# Patient Record
Sex: FEMALE | Race: WHITE | Employment: OTHER | URBAN - METROPOLITAN AREA
[De-identification: names, ages, dates, MRNs, and addresses within clinical notes are randomized per-mention and may not be internally consistent; named-entity substitution may affect disease eponyms.]

---

## 2018-11-07 PROBLEM — Z86.73 HISTORY OF CVA (CEREBROVASCULAR ACCIDENT): Status: ACTIVE | Noted: 2018-11-07

## 2018-11-07 PROBLEM — I65.21 STENOSIS OF RIGHT CAROTID ARTERY: Status: ACTIVE | Noted: 2018-11-07

## 2018-11-07 PROBLEM — E78.5 HYPERLIPIDEMIA: Status: ACTIVE | Noted: 2018-11-07

## 2018-11-07 PROBLEM — I10 ESSENTIAL HYPERTENSION: Status: ACTIVE | Noted: 2018-11-07

## 2019-07-11 ENCOUNTER — HOSPITAL ENCOUNTER (OUTPATIENT)
Dept: PREADMISSION TESTING | Age: 69
Discharge: HOME | DRG: 253 | End: 2019-07-11
Attending: SURGERY
Payer: MEDICARE

## 2019-07-11 ENCOUNTER — ANESTHESIA EVENT (OUTPATIENT)
Dept: SURGERY | Age: 69
DRG: 253 | End: 2019-07-11
Payer: MEDICARE

## 2019-07-11 ENCOUNTER — HOSPITAL ENCOUNTER (OUTPATIENT)
Dept: GENERAL RADIOLOGY | Age: 69
Discharge: HOME | DRG: 253 | End: 2019-07-11
Attending: SURGERY
Payer: MEDICARE

## 2019-07-11 VITALS
WEIGHT: 173.5 LBS | DIASTOLIC BLOOD PRESSURE: 52 MMHG | OXYGEN SATURATION: 98 % | SYSTOLIC BLOOD PRESSURE: 94 MMHG | TEMPERATURE: 98.1 F | BODY MASS INDEX: 32.76 KG/M2 | RESPIRATION RATE: 18 BRPM | HEIGHT: 61 IN | HEART RATE: 68 BPM

## 2019-07-11 LAB
ABO + RH BLD: NORMAL
ALBUMIN SERPL-MCNC: 3.5 G/DL (ref 3.5–5)
ALBUMIN/GLOB SERPL: 1.1 {RATIO} (ref 1.1–2.2)
ALP SERPL-CCNC: 93 U/L (ref 45–117)
ALT SERPL-CCNC: 19 U/L (ref 12–78)
ANION GAP SERPL CALC-SCNC: 5 MMOL/L (ref 5–15)
APPEARANCE UR: CLEAR
APTT PPP: 30.8 SEC (ref 22.1–32)
AST SERPL-CCNC: 13 U/L (ref 15–37)
ATRIAL RATE: 67 BPM
BACTERIA URNS QL MICRO: NEGATIVE /HPF
BILIRUB SERPL-MCNC: 0.4 MG/DL (ref 0.2–1)
BILIRUB UR QL: NEGATIVE
BLOOD GROUP ANTIBODIES SERPL: NORMAL
BUN SERPL-MCNC: 15 MG/DL (ref 6–20)
BUN/CREAT SERPL: 10 (ref 12–20)
CALCIUM SERPL-MCNC: 8.5 MG/DL (ref 8.5–10.1)
CALCULATED P AXIS, ECG09: 52 DEGREES
CALCULATED R AXIS, ECG10: 69 DEGREES
CALCULATED T AXIS, ECG11: 88 DEGREES
CHLORIDE SERPL-SCNC: 107 MMOL/L (ref 97–108)
CO2 SERPL-SCNC: 27 MMOL/L (ref 21–32)
COLOR UR: ABNORMAL
CREAT SERPL-MCNC: 1.44 MG/DL (ref 0.55–1.02)
DIAGNOSIS, 93000: NORMAL
EPITH CASTS URNS QL MICRO: ABNORMAL /LPF
ERYTHROCYTE [DISTWIDTH] IN BLOOD BY AUTOMATED COUNT: 13.8 % (ref 11.5–14.5)
GLOBULIN SER CALC-MCNC: 3.2 G/DL (ref 2–4)
GLUCOSE SERPL-MCNC: 90 MG/DL (ref 65–100)
GLUCOSE UR STRIP.AUTO-MCNC: NEGATIVE MG/DL
HCT VFR BLD AUTO: 37.9 % (ref 35–47)
HGB BLD-MCNC: 12.4 G/DL (ref 11.5–16)
HGB UR QL STRIP: NEGATIVE
HYALINE CASTS URNS QL MICRO: ABNORMAL /LPF (ref 0–5)
INR PPP: 1 (ref 0.9–1.1)
KETONES UR QL STRIP.AUTO: NEGATIVE MG/DL
LEUKOCYTE ESTERASE UR QL STRIP.AUTO: ABNORMAL
MCH RBC QN AUTO: 29 PG (ref 26–34)
MCHC RBC AUTO-ENTMCNC: 32.7 G/DL (ref 30–36.5)
MCV RBC AUTO: 88.6 FL (ref 80–99)
NITRITE UR QL STRIP.AUTO: NEGATIVE
NRBC # BLD: 0 K/UL (ref 0–0.01)
NRBC BLD-RTO: 0 PER 100 WBC
P-R INTERVAL, ECG05: 166 MS
PH UR STRIP: 6 [PH] (ref 5–8)
PLATELET # BLD AUTO: 269 K/UL (ref 150–400)
PMV BLD AUTO: 10.8 FL (ref 8.9–12.9)
POTASSIUM SERPL-SCNC: 3.7 MMOL/L (ref 3.5–5.1)
PROT SERPL-MCNC: 6.7 G/DL (ref 6.4–8.2)
PROT UR STRIP-MCNC: NEGATIVE MG/DL
PROTHROMBIN TIME: 10.6 SEC (ref 9–11.1)
Q-T INTERVAL, ECG07: 446 MS
QRS DURATION, ECG06: 86 MS
QTC CALCULATION (BEZET), ECG08: 471 MS
RBC # BLD AUTO: 4.28 M/UL (ref 3.8–5.2)
RBC #/AREA URNS HPF: ABNORMAL /HPF (ref 0–5)
SODIUM SERPL-SCNC: 139 MMOL/L (ref 136–145)
SP GR UR REFRACTOMETRY: 1.01 (ref 1–1.03)
SPECIMEN EXP DATE BLD: NORMAL
THERAPEUTIC RANGE,PTTT: NORMAL SECS (ref 58–77)
UA: UC IF INDICATED,UAUC: ABNORMAL
UROBILINOGEN UR QL STRIP.AUTO: 1 EU/DL (ref 0.2–1)
VENTRICULAR RATE, ECG03: 67 BPM
WBC # BLD AUTO: 7.3 K/UL (ref 3.6–11)
WBC URNS QL MICRO: ABNORMAL /HPF (ref 0–4)

## 2019-07-11 PROCEDURE — 81001 URINALYSIS AUTO W/SCOPE: CPT

## 2019-07-11 PROCEDURE — 36415 COLL VENOUS BLD VENIPUNCTURE: CPT

## 2019-07-11 PROCEDURE — 85027 COMPLETE CBC AUTOMATED: CPT

## 2019-07-11 PROCEDURE — 71046 X-RAY EXAM CHEST 2 VIEWS: CPT

## 2019-07-11 PROCEDURE — 86900 BLOOD TYPING SEROLOGIC ABO: CPT

## 2019-07-11 PROCEDURE — 80053 COMPREHEN METABOLIC PANEL: CPT

## 2019-07-11 PROCEDURE — 85730 THROMBOPLASTIN TIME PARTIAL: CPT

## 2019-07-11 PROCEDURE — 85610 PROTHROMBIN TIME: CPT

## 2019-07-11 PROCEDURE — 93005 ELECTROCARDIOGRAM TRACING: CPT

## 2019-07-11 RX ORDER — SODIUM CHLORIDE, SODIUM LACTATE, POTASSIUM CHLORIDE, CALCIUM CHLORIDE 600; 310; 30; 20 MG/100ML; MG/100ML; MG/100ML; MG/100ML
25 INJECTION, SOLUTION INTRAVENOUS CONTINUOUS
Status: CANCELLED | OUTPATIENT
Start: 2019-07-12

## 2019-07-11 RX ORDER — CEFAZOLIN SODIUM/WATER 2 G/20 ML
2 SYRINGE (ML) INTRAVENOUS ONCE
Status: CANCELLED | OUTPATIENT
Start: 2019-07-12 | End: 2019-07-12

## 2019-07-11 NOTE — ADVANCED PRACTICE NURSE
Preliminary EKG from 7/11/19 reviewed with Dr. Pablo Chandler, anesthesiologist. No previous EKG available for comparison. Pt has not had EKG at PCP, does not have cardiologist and  states pt was treated in [place] in 2004 for CVA. Planned procedure, PMHx, functional status reviewed. Pt ok to proceed with planned procedure per Dr. Pablo Chandler.

## 2019-07-11 NOTE — PERIOP NOTES
[facility]  Preoperative Instructions        Surgery Date 7/12/19          Time of Arrival 0930    Contact #:  [phone]    1. On the day of your surgery, please report to the Surgical Services Registration Desk and sign in at your designated time. The Surgery Center is located to the right of the Emergency Room. 2. You must have someone with you to drive you home. You should not drive a car for 24 hours following surgery. Please make arrangements for a friend or family member to stay with you for the first 24 hours after your surgery. 3. Do not have anything to eat or drink (including water, gum, mints, coffee, juice) after midnight 7/11/19. ? This may not apply to medications prescribed by your physician. ?(Please note below the special instructions with medications to take the morning of your procedure.)    4. We recommend you do not drink any alcoholic beverages for 24 hours before and after your surgery. 5. Contact your surgeons office for instructions on the following medications: non-steroidal anti-inflammatory drugs (i.e. Advil, Aleve), vitamins, and supplements. (Some surgeons will want you to stop these medications prior to surgery and others may allow you to take them)  **If you are currently taking Plavix, Coumadin, Aspirin and/or other blood-thinning agents, contact your surgeon for instructions. ** Your surgeon will partner with the physician prescribing these medications to determine if it is safe to stop or if you need to continue taking. Please do not stop taking these medications without instructions from your surgeon    6. Wear comfortable clothes. Wear glasses instead of contacts. Do not bring any money or jewelry. Please bring picture ID, insurance card, and any prearranged co-payment or hospital payment. Do not wear make-up, particularly mascara the morning of your surgery. Do not wear nail polish, particularly if you are having foot /hand surgery. Wear your hair loose or down, no ponytails, buns, kim pins or clips. All body piercings must be removed. Please shower with antibacterial soap for three consecutive days before and on the morning of surgery, but do not apply any lotions, powders or deodorants after the shower on the day of surgery. Please use a fresh towels after each shower. Please sleep in clean clothes and change bed linens the night before surgery. Please do not shave for 48 hours prior to surgery. Shaving of the face is acceptable. 7. You should understand that if you do not follow these instructions your surgery may be cancelled. If your physical condition changes (I.e. fever, cold or flu) please contact your surgeon as soon as possible. 8. It is important that you be on time. If a situation occurs where you may be late, please call [phone] (OR Holding Area). 9. If you have any questions and or problems, please call [phone] (Pre-admission Testing). 10. Your surgery time may be subject to change. You will receive a phone call the evening prior if your time changes. 11.  If having outpatient surgery, you must have someone to drive you here, stay with you during the duration of your stay, and to drive you home at time of discharge. 12.   In an effort to improve the efficiency, privacy, and safety for all of our Pre-op patients visitors are not allowed in the Holding area. Once you arrive and are registered your family/visitors will be asked to remain in the waiting room. The Pre-op staff will get you from the Surgical Waiting Area and will explain to you and your family/visitors that the Pre-op phase is beginning. The staff will answer any questions and provide instructions for tracking of the patient, by use of the existing tracking number and color-coded status board in the waiting room.   At this time the staff will also ask for your designated spokesperson information in the event that the physician or staff need to provide an update or obtain any pertinent information. The designated spokesperson will be notified if the physician needs to speak to family during the pre-operative phase. If at any time your family/visitors has questions or concerns they may approach the volunteer desk in the waiting area for assistance. Special Instructions:  Practice Incentive Spirometer twice per day (10 breaths each time) and bring to hospital day of surgery. Follow MD instructions when to stop Plavix (7-9-2019 and Aspirin. MEDICATIONS TO TAKE THE MORNING OF SURGERY WITH A SIP OF WATER:  Amlodipine, fluoxetine, labetalol. I understand a pre-operative phone call will be made to verify my surgery time. In the event that I am not available, I give permission for a message to be left on my answering service and/or with another person?   yes         ___________________      __________   7/11/2019 @ 5989    (Signature of Patient)             (Witness)                (Date and Time)

## 2019-07-11 NOTE — PERIOP NOTES
7/11/2019 @ 1444: Call placed to Dr. Kade Middleton, sp/w Sampson Kerns and requested old EKG. Per Sampson Kerns there is no EKG in patient record.

## 2019-07-11 NOTE — PERIOP NOTES
Incentive Spirometer        Using the incentive spirometer helps expand the small air sacs of your lungs, helps you breathe deeply, and helps improve your lung function. Use your incentive spirometer twice a day (10 breaths each time) prior to surgery. How to Use Your Incentive Spirometer:  1. Hold the incentive spirometer in an upright position. 2. Breathe out as usual.   3. Place the mouthpiece in your mouth and seal your lips tightly around it. 4. Take a deep breath. Breathe in slowly and as deeply as possible. Keep the blue flow rate guide between the arrows. 5. Hold your breath as long as possible. Then exhale slowly and allow the piston to fall to the bottom of the column. 6. Rest for a few seconds and repeat steps one through five at least 10 times. PAT Tidal Volume__________________  x_____2_____  Date   7/11/2019 @  200    BRING THE INCENTIVE SPIROMETER WITH YOU TO [address] ON THE DAY OF YOUR SURGERY. Opportunity given to ask and answer questions as well as to observe return demonstration.     Patient signature_____________________________    Witness____________________________

## 2019-07-12 ENCOUNTER — ANESTHESIA (OUTPATIENT)
Dept: SURGERY | Age: 69
DRG: 253 | End: 2019-07-12
Payer: MEDICARE

## 2019-07-12 ENCOUNTER — HOSPITAL ENCOUNTER (INPATIENT)
Age: 69
LOS: 1 days | Discharge: HOME | DRG: 253 | End: 2019-07-13
Attending: SURGERY | Admitting: SURGERY
Payer: MEDICARE

## 2019-07-12 DIAGNOSIS — I70.201 FEMORAL ARTERY OCCLUSION, RIGHT (HCC): Primary | ICD-10-CM

## 2019-07-12 PROBLEM — I73.9 CLAUDICATION OF RIGHT LOWER EXTREMITY (HCC): Status: ACTIVE | Noted: 2019-07-12

## 2019-07-12 PROCEDURE — 77030011640 HC PAD GRND REM COVD -A: Performed by: SURGERY

## 2019-07-12 PROCEDURE — 76210000017 HC OR PH I REC 1.5 TO 2 HR: Performed by: SURGERY

## 2019-07-12 PROCEDURE — 77030031139 HC SUT VCRL2 J&J -A: Performed by: SURGERY

## 2019-07-12 PROCEDURE — 77030018836 HC SOL IRR NACL ICUM -A: Performed by: SURGERY

## 2019-07-12 PROCEDURE — 76010000161 HC OR TIME 1 TO 1.5 HR INTENSV-TIER 1: Performed by: SURGERY

## 2019-07-12 PROCEDURE — 74011250636 HC RX REV CODE- 250/636: Performed by: SURGERY

## 2019-07-12 PROCEDURE — 76060000033 HC ANESTHESIA 1 TO 1.5 HR: Performed by: SURGERY

## 2019-07-12 PROCEDURE — 74011250636 HC RX REV CODE- 250/636: Performed by: ANESTHESIOLOGY

## 2019-07-12 PROCEDURE — 74011000272 HC RX REV CODE- 272: Performed by: SURGERY

## 2019-07-12 PROCEDURE — 94760 N-INVAS EAR/PLS OXIMETRY 1: CPT

## 2019-07-12 PROCEDURE — 77030019908 HC STETH ESOPH SIMS -A: Performed by: NURSE ANESTHETIST, CERTIFIED REGISTERED

## 2019-07-12 PROCEDURE — C1768 GRAFT, VASCULAR: HCPCS | Performed by: SURGERY

## 2019-07-12 PROCEDURE — 74011000258 HC RX REV CODE- 258: Performed by: SURGERY

## 2019-07-12 PROCEDURE — 77010033678 HC OXYGEN DAILY

## 2019-07-12 PROCEDURE — 77030038269 HC DRN EXT URIN PURWCK BARD -A

## 2019-07-12 PROCEDURE — 74011000250 HC RX REV CODE- 250: Performed by: SURGERY

## 2019-07-12 PROCEDURE — 65660000000 HC RM CCU STEPDOWN

## 2019-07-12 PROCEDURE — 77030020782 HC GWN BAIR PAWS FLX 3M -B

## 2019-07-12 PROCEDURE — 04CK0ZZ EXTIRPATION OF MATTER FROM RIGHT FEMORAL ARTERY, OPEN APPROACH: ICD-10-PCS | Performed by: SURGERY

## 2019-07-12 PROCEDURE — 88311 DECALCIFY TISSUE: CPT

## 2019-07-12 PROCEDURE — 77030026438 HC STYL ET INTUB CARD -A: Performed by: NURSE ANESTHETIST, CERTIFIED REGISTERED

## 2019-07-12 PROCEDURE — 77030020061 HC IV BLD WRMR ADMIN SET 3M -B: Performed by: ANESTHESIOLOGY

## 2019-07-12 PROCEDURE — 77030008684 HC TU ET CUF COVD -B: Performed by: NURSE ANESTHETIST, CERTIFIED REGISTERED

## 2019-07-12 PROCEDURE — 88304 TISSUE EXAM BY PATHOLOGIST: CPT

## 2019-07-12 PROCEDURE — 77030002924 HC SUT GORTX WLGO -B: Performed by: SURGERY

## 2019-07-12 PROCEDURE — 74011250637 HC RX REV CODE- 250/637: Performed by: SURGERY

## 2019-07-12 PROCEDURE — 74011000250 HC RX REV CODE- 250

## 2019-07-12 PROCEDURE — 77030020256 HC SOL INJ NACL 0.9%  500ML: Performed by: SURGERY

## 2019-07-12 PROCEDURE — 04UK0JZ SUPPLEMENT RIGHT FEMORAL ARTERY WITH SYNTHETIC SUBSTITUTE, OPEN APPROACH: ICD-10-PCS | Performed by: SURGERY

## 2019-07-12 PROCEDURE — 74011250636 HC RX REV CODE- 250/636

## 2019-07-12 PROCEDURE — 77030002986 HC SUT PROL J&J -A: Performed by: SURGERY

## 2019-07-12 DEVICE — GRAFT PTCH TW GEL SEAL 8X75MM -- VASCUTEK FLUOROPASSIV: Type: IMPLANTABLE DEVICE | Site: GROIN | Status: FUNCTIONAL

## 2019-07-12 RX ORDER — ACETAMINOPHEN 10 MG/ML
INJECTION, SOLUTION INTRAVENOUS AS NEEDED
Status: DISCONTINUED | OUTPATIENT
Start: 2019-07-12 | End: 2019-07-12 | Stop reason: HOSPADM

## 2019-07-12 RX ORDER — ONDANSETRON 2 MG/ML
4 INJECTION INTRAMUSCULAR; INTRAVENOUS AS NEEDED
Status: DISCONTINUED | OUTPATIENT
Start: 2019-07-12 | End: 2019-07-12 | Stop reason: HOSPADM

## 2019-07-12 RX ORDER — CLOPIDOGREL BISULFATE 75 MG/1
75 TABLET ORAL DAILY
Status: DISCONTINUED | OUTPATIENT
Start: 2019-07-13 | End: 2019-07-13 | Stop reason: HOSPADM

## 2019-07-12 RX ORDER — DIPHENHYDRAMINE HYDROCHLORIDE 50 MG/ML
12.5 INJECTION, SOLUTION INTRAMUSCULAR; INTRAVENOUS AS NEEDED
Status: DISCONTINUED | OUTPATIENT
Start: 2019-07-12 | End: 2019-07-12 | Stop reason: HOSPADM

## 2019-07-12 RX ORDER — LIDOCAINE HYDROCHLORIDE 20 MG/ML
INJECTION, SOLUTION EPIDURAL; INFILTRATION; INTRACAUDAL; PERINEURAL AS NEEDED
Status: DISCONTINUED | OUTPATIENT
Start: 2019-07-12 | End: 2019-07-12 | Stop reason: HOSPADM

## 2019-07-12 RX ORDER — HEPARIN SODIUM 1000 [USP'U]/ML
INJECTION, SOLUTION INTRAVENOUS; SUBCUTANEOUS AS NEEDED
Status: DISCONTINUED | OUTPATIENT
Start: 2019-07-12 | End: 2019-07-12 | Stop reason: HOSPADM

## 2019-07-12 RX ORDER — NEOSTIGMINE METHYLSULFATE 1 MG/ML
INJECTION INTRAVENOUS AS NEEDED
Status: DISCONTINUED | OUTPATIENT
Start: 2019-07-12 | End: 2019-07-12 | Stop reason: HOSPADM

## 2019-07-12 RX ORDER — SODIUM CHLORIDE 0.9 % (FLUSH) 0.9 %
5-40 SYRINGE (ML) INJECTION EVERY 8 HOURS
Status: DISCONTINUED | OUTPATIENT
Start: 2019-07-12 | End: 2019-07-12 | Stop reason: HOSPADM

## 2019-07-12 RX ORDER — HYDROMORPHONE HYDROCHLORIDE 2 MG/ML
INJECTION, SOLUTION INTRAMUSCULAR; INTRAVENOUS; SUBCUTANEOUS AS NEEDED
Status: DISCONTINUED | OUTPATIENT
Start: 2019-07-12 | End: 2019-07-12 | Stop reason: HOSPADM

## 2019-07-12 RX ORDER — ATORVASTATIN CALCIUM 40 MG/1
40 TABLET, FILM COATED ORAL
Status: DISCONTINUED | OUTPATIENT
Start: 2019-07-12 | End: 2019-07-13 | Stop reason: HOSPADM

## 2019-07-12 RX ORDER — ACETAMINOPHEN 325 MG/1
325 TABLET ORAL
Status: DISCONTINUED | OUTPATIENT
Start: 2019-07-12 | End: 2019-07-13 | Stop reason: HOSPADM

## 2019-07-12 RX ORDER — ENOXAPARIN SODIUM 100 MG/ML
40 INJECTION SUBCUTANEOUS EVERY 24 HOURS
Status: DISCONTINUED | OUTPATIENT
Start: 2019-07-12 | End: 2019-07-13 | Stop reason: HOSPADM

## 2019-07-12 RX ORDER — GUANFACINE HYDROCHLORIDE 1 MG/1
2 TABLET ORAL
Status: DISCONTINUED | OUTPATIENT
Start: 2019-07-12 | End: 2019-07-13 | Stop reason: HOSPADM

## 2019-07-12 RX ORDER — FENTANYL CITRATE 50 UG/ML
INJECTION, SOLUTION INTRAMUSCULAR; INTRAVENOUS AS NEEDED
Status: DISCONTINUED | OUTPATIENT
Start: 2019-07-12 | End: 2019-07-12 | Stop reason: HOSPADM

## 2019-07-12 RX ORDER — PROPOFOL 10 MG/ML
INJECTION, EMULSION INTRAVENOUS AS NEEDED
Status: DISCONTINUED | OUTPATIENT
Start: 2019-07-12 | End: 2019-07-12 | Stop reason: HOSPADM

## 2019-07-12 RX ORDER — AMLODIPINE BESYLATE 5 MG/1
10 TABLET ORAL DAILY
Status: DISCONTINUED | OUTPATIENT
Start: 2019-07-13 | End: 2019-07-13 | Stop reason: HOSPADM

## 2019-07-12 RX ORDER — HYDROMORPHONE HYDROCHLORIDE 1 MG/ML
.2-.5 INJECTION, SOLUTION INTRAMUSCULAR; INTRAVENOUS; SUBCUTANEOUS
Status: DISCONTINUED | OUTPATIENT
Start: 2019-07-12 | End: 2019-07-12 | Stop reason: HOSPADM

## 2019-07-12 RX ORDER — SODIUM CHLORIDE, SODIUM LACTATE, POTASSIUM CHLORIDE, CALCIUM CHLORIDE 600; 310; 30; 20 MG/100ML; MG/100ML; MG/100ML; MG/100ML
25 INJECTION, SOLUTION INTRAVENOUS CONTINUOUS
Status: DISCONTINUED | OUTPATIENT
Start: 2019-07-12 | End: 2019-07-12 | Stop reason: HOSPADM

## 2019-07-12 RX ORDER — DEXAMETHASONE SODIUM PHOSPHATE 4 MG/ML
INJECTION, SOLUTION INTRA-ARTICULAR; INTRALESIONAL; INTRAMUSCULAR; INTRAVENOUS; SOFT TISSUE AS NEEDED
Status: DISCONTINUED | OUTPATIENT
Start: 2019-07-12 | End: 2019-07-12 | Stop reason: HOSPADM

## 2019-07-12 RX ORDER — SODIUM CHLORIDE 0.9 % (FLUSH) 0.9 %
5-40 SYRINGE (ML) INJECTION AS NEEDED
Status: DISCONTINUED | OUTPATIENT
Start: 2019-07-12 | End: 2019-07-12 | Stop reason: HOSPADM

## 2019-07-12 RX ORDER — ROCURONIUM BROMIDE 10 MG/ML
INJECTION, SOLUTION INTRAVENOUS AS NEEDED
Status: DISCONTINUED | OUTPATIENT
Start: 2019-07-12 | End: 2019-07-12 | Stop reason: HOSPADM

## 2019-07-12 RX ORDER — CEFAZOLIN SODIUM/WATER 2 G/20 ML
2 SYRINGE (ML) INTRAVENOUS ONCE
Status: COMPLETED | OUTPATIENT
Start: 2019-07-12 | End: 2019-07-12

## 2019-07-12 RX ORDER — MORPHINE SULFATE IN 0.9 % NACL 150MG/30ML
PATIENT CONTROLLED ANALGESIA SYRINGE INTRAVENOUS
Status: DISCONTINUED | OUTPATIENT
Start: 2019-07-12 | End: 2019-07-13 | Stop reason: HOSPADM

## 2019-07-12 RX ORDER — FLUOXETINE HYDROCHLORIDE 20 MG/1
40 CAPSULE ORAL DAILY
Status: DISCONTINUED | OUTPATIENT
Start: 2019-07-13 | End: 2019-07-13 | Stop reason: HOSPADM

## 2019-07-12 RX ORDER — ASPIRIN 81 MG/1
81 TABLET ORAL DAILY
Status: DISCONTINUED | OUTPATIENT
Start: 2019-07-13 | End: 2019-07-13 | Stop reason: HOSPADM

## 2019-07-12 RX ORDER — GUANFACINE HYDROCHLORIDE 1 MG/1
2 TABLET ORAL
Status: DISCONTINUED | OUTPATIENT
Start: 2019-07-12 | End: 2019-07-12

## 2019-07-12 RX ORDER — MORPHINE SULFATE 10 MG/ML
2 INJECTION, SOLUTION INTRAMUSCULAR; INTRAVENOUS
Status: DISCONTINUED | OUTPATIENT
Start: 2019-07-12 | End: 2019-07-12 | Stop reason: HOSPADM

## 2019-07-12 RX ORDER — MIDAZOLAM HYDROCHLORIDE 1 MG/ML
INJECTION, SOLUTION INTRAMUSCULAR; INTRAVENOUS AS NEEDED
Status: DISCONTINUED | OUTPATIENT
Start: 2019-07-12 | End: 2019-07-12 | Stop reason: HOSPADM

## 2019-07-12 RX ORDER — GLYCOPYRROLATE 0.2 MG/ML
INJECTION INTRAMUSCULAR; INTRAVENOUS AS NEEDED
Status: DISCONTINUED | OUTPATIENT
Start: 2019-07-12 | End: 2019-07-12 | Stop reason: HOSPADM

## 2019-07-12 RX ORDER — FENTANYL CITRATE 50 UG/ML
25 INJECTION, SOLUTION INTRAMUSCULAR; INTRAVENOUS
Status: DISCONTINUED | OUTPATIENT
Start: 2019-07-12 | End: 2019-07-12 | Stop reason: HOSPADM

## 2019-07-12 RX ORDER — DEXTROSE, SODIUM CHLORIDE, SODIUM LACTATE, POTASSIUM CHLORIDE, AND CALCIUM CHLORIDE 5; .6; .31; .03; .02 G/100ML; G/100ML; G/100ML; G/100ML; G/100ML
75 INJECTION, SOLUTION INTRAVENOUS CONTINUOUS
Status: DISCONTINUED | OUTPATIENT
Start: 2019-07-12 | End: 2019-07-13 | Stop reason: HOSPADM

## 2019-07-12 RX ORDER — ONDANSETRON 2 MG/ML
INJECTION INTRAMUSCULAR; INTRAVENOUS AS NEEDED
Status: DISCONTINUED | OUTPATIENT
Start: 2019-07-12 | End: 2019-07-12 | Stop reason: HOSPADM

## 2019-07-12 RX ADMIN — NEOSTIGMINE METHYLSULFATE 3.5 MG: 1 INJECTION INTRAVENOUS at 13:01

## 2019-07-12 RX ADMIN — ROCURONIUM BROMIDE 10 MG: 10 INJECTION, SOLUTION INTRAVENOUS at 12:19

## 2019-07-12 RX ADMIN — PROPOFOL 120 MG: 10 INJECTION, EMULSION INTRAVENOUS at 12:13

## 2019-07-12 RX ADMIN — Medication: at 14:59

## 2019-07-12 RX ADMIN — FENTANYL CITRATE 50 MCG: 50 INJECTION, SOLUTION INTRAMUSCULAR; INTRAVENOUS at 12:13

## 2019-07-12 RX ADMIN — FENTANYL CITRATE 50 MCG: 50 INJECTION, SOLUTION INTRAMUSCULAR; INTRAVENOUS at 12:25

## 2019-07-12 RX ADMIN — ACETAMINOPHEN 1000 MG: 10 INJECTION, SOLUTION INTRAVENOUS at 12:31

## 2019-07-12 RX ADMIN — ENOXAPARIN SODIUM 40 MG: 40 INJECTION SUBCUTANEOUS at 18:45

## 2019-07-12 RX ADMIN — SODIUM CHLORIDE, SODIUM LACTATE, POTASSIUM CHLORIDE, CALCIUM CHLORIDE, AND DEXTROSE MONOHYDRATE 75 ML/HR: 600; 310; 30; 20; 5 INJECTION, SOLUTION INTRAVENOUS at 23:59

## 2019-07-12 RX ADMIN — HYDROMORPHONE HYDROCHLORIDE 0.2 MG: 2 INJECTION, SOLUTION INTRAMUSCULAR; INTRAVENOUS; SUBCUTANEOUS at 12:42

## 2019-07-12 RX ADMIN — PROPOFOL 30 MG: 10 INJECTION, EMULSION INTRAVENOUS at 13:11

## 2019-07-12 RX ADMIN — LABETALOL HYDROCHLORIDE 300 MG: 200 TABLET, FILM COATED ORAL at 18:44

## 2019-07-12 RX ADMIN — CEFAZOLIN 1 G: 1 INJECTION, POWDER, FOR SOLUTION INTRAMUSCULAR; INTRAVENOUS at 20:52

## 2019-07-12 RX ADMIN — PROPOFOL 20 MG: 10 INJECTION, EMULSION INTRAVENOUS at 12:21

## 2019-07-12 RX ADMIN — MIDAZOLAM HYDROCHLORIDE 0.5 MG: 1 INJECTION, SOLUTION INTRAMUSCULAR; INTRAVENOUS at 12:08

## 2019-07-12 RX ADMIN — GUANFACINE HYDROCHLORIDE 2 MG: 1 TABLET ORAL at 20:52

## 2019-07-12 RX ADMIN — SODIUM CHLORIDE, SODIUM LACTATE, POTASSIUM CHLORIDE, CALCIUM CHLORIDE, AND DEXTROSE MONOHYDRATE 75 ML/HR: 600; 310; 30; 20; 5 INJECTION, SOLUTION INTRAVENOUS at 14:59

## 2019-07-12 RX ADMIN — ATORVASTATIN CALCIUM 40 MG: 40 TABLET, FILM COATED ORAL at 20:52

## 2019-07-12 RX ADMIN — ONDANSETRON 4 MG: 2 INJECTION INTRAMUSCULAR; INTRAVENOUS at 13:00

## 2019-07-12 RX ADMIN — GLYCOPYRROLATE 0.7 MG: 0.2 INJECTION INTRAMUSCULAR; INTRAVENOUS at 13:01

## 2019-07-12 RX ADMIN — SODIUM CHLORIDE, SODIUM LACTATE, POTASSIUM CHLORIDE, AND CALCIUM CHLORIDE 25 ML/HR: 600; 310; 30; 20 INJECTION, SOLUTION INTRAVENOUS at 10:10

## 2019-07-12 RX ADMIN — Medication 2 G: at 12:27

## 2019-07-12 RX ADMIN — DEXAMETHASONE SODIUM PHOSPHATE 10 MG: 4 INJECTION, SOLUTION INTRA-ARTICULAR; INTRALESIONAL; INTRAMUSCULAR; INTRAVENOUS; SOFT TISSUE at 12:30

## 2019-07-12 RX ADMIN — HEPARIN SODIUM 5000 UNITS: 1000 INJECTION, SOLUTION INTRAVENOUS; SUBCUTANEOUS at 12:41

## 2019-07-12 RX ADMIN — PROPOFOL 30 MG: 10 INJECTION, EMULSION INTRAVENOUS at 12:19

## 2019-07-12 RX ADMIN — LIDOCAINE HYDROCHLORIDE 80 MG: 20 INJECTION, SOLUTION EPIDURAL; INFILTRATION; INTRACAUDAL; PERINEURAL at 12:13

## 2019-07-12 RX ADMIN — PROPOFOL 30 MG: 10 INJECTION, EMULSION INTRAVENOUS at 12:16

## 2019-07-12 RX ADMIN — PROPOFOL 20 MG: 10 INJECTION, EMULSION INTRAVENOUS at 13:06

## 2019-07-12 RX ADMIN — ROCURONIUM BROMIDE 30 MG: 10 INJECTION, SOLUTION INTRAVENOUS at 12:13

## 2019-07-12 NOTE — PERIOP NOTES
Handoff Report from Operating Room to PACU    Report received from Brennen Valentino RN and Irma Brink CRNA regarding Sierra Lock. Surgeon(s):  Luis Antonio Lucero MD  And Procedure(s) (LRB):  RIGHT FEMORAL ENDARTERECTOMY with patch angioplasty (Right)  confirmed   with allergies and dressings discussed. Anesthesia type, drugs, patient history, complications, estimated blood loss, vital signs, intake and output, and last pain medication, lines, reversal medications and temperature were reviewed.

## 2019-07-12 NOTE — ANESTHESIA POSTPROCEDURE EVALUATION
Procedure(s):  RIGHT FEMORAL ENDARTERECTOMY with patch angioplasty. general    Anesthesia Post Evaluation        Patient location during evaluation: PACU  Note status: Adequate. Level of consciousness: responsive to verbal stimuli and sleepy but conscious  Pain management: satisfactory to patient  Airway patency: patent  Anesthetic complications: no  Cardiovascular status: acceptable  Respiratory status: acceptable  Hydration status: acceptable  Comments: +Post-Anesthesia Evaluation and Assessment    Patient: Sierra Hines MRN: [id number]  SSN: [id number]   YOB: 1950  Age: 71 y.o. Sex: female      Cardiovascular Function/Vital Signs    /46   Pulse 61   Temp 36.8 °C (98.2 °F)   Resp 14   Ht 5' 1\" (1.549 m)   Wt 77 kg (169 lb 12.1 oz)   SpO2 91%   BMI 32.07 kg/m²     Patient is status post Procedure(s):  RIGHT FEMORAL ENDARTERECTOMY with patch angioplasty. Nausea/Vomiting: Controlled. Postoperative hydration reviewed and adequate. Pain:  Pain Scale 1: Numeric (0 - 10) (07/12/19 1404)  Pain Intensity 1: 0 (07/12/19 1404)   Managed. Neurological Status:   Neuro (WDL): Exceptions to WDL (07/12/19 1328)   At baseline. Mental Status and Level of Consciousness: Arousable. Pulmonary Status:   O2 Device: Nasal cannula (07/12/19 1404)   Adequate oxygenation and airway patent. Complications related to anesthesia: None    Post-anesthesia assessment completed. No concerns. Signed By: Isabell Saldaña MD    7/12/2019  Post anesthesia nausea and vomiting:  controlled      Vitals Value Taken Time   /47 7/12/2019  3:00 PM   Temp 36.8 °C (98.2 °F) 7/12/2019  2:15 PM   Pulse 61 7/12/2019  3:02 PM   Resp 16 7/12/2019  3:02 PM   SpO2 93 % 7/12/2019  3:02 PM   Vitals shown include unvalidated device data.

## 2019-07-12 NOTE — ANESTHESIA PREPROCEDURE EVALUATION
Relevant Problems   No relevant active problems       Anesthetic History   No history of anesthetic complications            Review of Systems / Medical History  Patient summary reviewed, nursing notes reviewed and pertinent labs reviewed    Pulmonary        Sleep apnea  Smoker         Neuro/Psych       CVA  TIA and psychiatric history     Cardiovascular    Hypertension          CAD and PAD    Exercise tolerance: >4 METS  Comments: Carotid artery stent     GI/Hepatic/Renal  Within defined limits              Endo/Other        Arthritis     Other Findings              Physical Exam    Airway  Mallampati: III  TM Distance: 4 - 6 cm  Neck ROM: normal range of motion   Mouth opening: Diminished (comment)     Cardiovascular  Regular rate and rhythm,  S1 and S2 normal,  no murmur, click, rub, or gallop             Dental  No notable dental hx       Pulmonary  Breath sounds clear to auscultation               Abdominal  GI exam deferred       Other Findings            Anesthetic Plan    ASA: 3  Anesthesia type: general            Anesthetic plan and risks discussed with: Patient      Small mouth opening, have glidescope available

## 2019-07-12 NOTE — PERIOP NOTES
TRANSFER - OUT REPORT:    Verbal report given to Anabella MCGRAW  on [address]  being transferred to [facility](unit) for routine post - op       Report consisted of patients Situation, Background, Assessment and   Recommendations(SBAR). Information from the following report(s) SBAR, OR Summary, Procedure Summary, Intake/Output, MAR, Recent Results and Cardiac Rhythm NSR was reviewed with the receiving nurse. Opportunity for questions and clarification was provided.       Patient transported with:   Monitor  O2 @ 2 liters  Registered Nurse

## 2019-07-12 NOTE — BRIEF OP NOTE
BRIEF OPERATIVE NOTE    Date of Procedure: 7/12/2019   Preoperative Diagnosis: INTERMITTENT CLAUDICATION RIGHT LEG  Postoperative Diagnosis: INTERMITTENT CLAUDICATION RIGHT LEG    Procedure(s):  RIGHT FEMORAL ENDARTERECTOMY with patch angioplasty  Surgeon(s) and Role:     * Rocio Martinez MD - Primary         Surgical Assistant: chung    Surgical Staff:  Circ-1: Rosalie Spear RN  Circ-Relief: Jose Luna RN  Scrub Tech-1: Duarte Espinoza  Scrub RN-Relief: Mely Hart RN  Surg Asst-1: Ajay oFnseca  Event Time In Time Out   Incision Start 1235    Incision Close 1319      Anesthesia: General   Estimated Blood Loss: 15cc  Specimens:   ID Type Source Tests Collected by Time Destination   1 : right femoral plaque Preservative Artery  Willis Treviño MD 7/12/2019 1259 Pathology      Findings: aso   Complications: 0  Implants:   Implant Name Type Inv.  Item Serial No.  Lot No. LRB No. Used Action   GRAFT PTCH TW GEL SEAL [id number] -- Nadeem Belinda  GRAFT PTCH TW GEL SEAL [id number] -- Maria Flor [id number] [facility] CARDIOVASCULAR [id number] Right 1 Implanted

## 2019-07-13 VITALS
OXYGEN SATURATION: 97 % | TEMPERATURE: 97.3 F | DIASTOLIC BLOOD PRESSURE: 61 MMHG | HEIGHT: 61 IN | HEART RATE: 74 BPM | BODY MASS INDEX: 32.38 KG/M2 | SYSTOLIC BLOOD PRESSURE: 152 MMHG | WEIGHT: 171.52 LBS | RESPIRATION RATE: 18 BRPM

## 2019-07-13 PROCEDURE — 74011250637 HC RX REV CODE- 250/637: Performed by: HOSPITALIST

## 2019-07-13 PROCEDURE — 74011250636 HC RX REV CODE- 250/636: Performed by: SURGERY

## 2019-07-13 PROCEDURE — 74011000258 HC RX REV CODE- 258: Performed by: SURGERY

## 2019-07-13 PROCEDURE — 74011250637 HC RX REV CODE- 250/637: Performed by: SURGERY

## 2019-07-13 RX ORDER — TRAMADOL HYDROCHLORIDE 50 MG/1
50 TABLET ORAL
Qty: 10 TAB | Refills: 0 | Status: SHIPPED | OUTPATIENT
Start: 2019-07-13 | End: 2019-07-16

## 2019-07-13 RX ADMIN — AMLODIPINE BESYLATE 10 MG: 5 TABLET ORAL at 08:10

## 2019-07-13 RX ADMIN — CEFAZOLIN 1 G: 1 INJECTION, POWDER, FOR SOLUTION INTRAMUSCULAR; INTRAVENOUS at 03:14

## 2019-07-13 RX ADMIN — FLUOXETINE 40 MG: 20 CAPSULE ORAL at 08:10

## 2019-07-13 RX ADMIN — ASPIRIN 81 MG: 81 TABLET ORAL at 08:10

## 2019-07-13 RX ADMIN — Medication: at 07:10

## 2019-07-13 RX ADMIN — LABETALOL HYDROCHLORIDE 300 MG: 200 TABLET, FILM COATED ORAL at 10:04

## 2019-07-13 RX ADMIN — CLOPIDOGREL BISULFATE 75 MG: 75 TABLET ORAL at 08:10

## 2019-07-13 NOTE — OP NOTES
[address]  OPERATIVE REPORT    Name:  Keiko Almonte  MR#:  [id number]  :  1950  ACCOUNT #:  [de-identified]  DATE OF SERVICE:  2019      PREOPERATIVE DIAGNOSIS:  Peripheral vascular disease. POSTOPERATIVE DIAGNOSIS:  Peripheral vascular disease. PROCEDURE PERFORMED:  Right common femoral, profunda, and superficial femoral endarterectomy with Fluoropassiv patch angioplasty. SURGEON:  Kimmie Wyatt MD.    Yohan Devlin. ANESTHESIA:  General.    COMPLICATIONS:  None. SPECIMENS REMOVED:  Plaque. IMPLANTS:  Fluoropassiv patch. ESTIMATED BLOOD LOSS:  15 mL. PROCEDURE:  With the patient supine on the operating table, general anesthesia was induced and abdomen and right leg prepared as a sterile field. A vertical incision was made in the right groin, sharp dissection carried down through the skin and subcutaneous tissue. The common femoral artery and its branches were identified and isolated. On preoperative angiography, the profunda femoris artery was thought to be occluded. It was occluded at its origin, but was patent just distal to this. The SFA was patent with similar disease. There was heavy tooth-like calcification of the common femoral artery. The patient was systemically heparinized. The common femoral, SFA and profunda were occluded. A longitudinal arteriotomy was made on the common femoral and carried on to the SFA. The endarterectomy of the rock hard plaque was carried out. There was a tapering endpoint in the profunda and SFA. Eversion endarterectomy of the common femoral was carried out. The endarterectomized segment was cleaned of all visible loose debris. The arteriotomy was closed with Fluoropassiv patch applied with running simple suture of 5-0 PTFE. Release of the vascular occlusion clamps resulted in excellent pulse and Doppler signal in the common femoral, SFA and profunda. Heparinization was not reversed.   The wounds were examined for hemostasis, which was considered adequate. The subcutaneous tissue was closed with 2-0 Vicryl and the skin with subcuticular suture of 3-0 Vicryl. Flexible collodion was applied. The patient returned to the recovery area in guarded condition.       Octavia Gates MD      [id number]  D:  07/12/2019 13:52  T:  07/12/2019 16:49  JOB #:  [id number]  CC:  Christine Akers MD

## 2019-07-13 NOTE — PROGRESS NOTES
0710 : Report received from [place], [facility]. SBAR, Kardex, MAR and Recent Results were discussed. Katie Villafuerte, RN    1251 : Called hospitalist consult to on-call. 1119 : Dr. Blaine James at bedside. SBP in left arm 10/57 and SBP in right arm 148/62. Orders to give norvasc and hold labetalol. 1017 :   DISCHARGE SUMMARY from Nurse    The following personal items collected during your admission are returned to you:   Dental Appliance: Dental Appliances: Other (comment)(permanent bridges upper and lower)  Vision: Visual Aid: Glasses, With patient  Hearing Aid:    Jewelry: Jewelry: None  Clothing: Clothing: Undergarments, Dress, Socks, Footwear  Other Valuables: Other Valuables: None  Valuables sent to safe:      PATIENT INSTRUCTIONS:    Take Home Medications:  Current Discharge Medication List      START taking these medications    Details   traMADol (ULTRAM) 50 mg tablet Take 1 Tab by mouth every six (6) hours as needed for Pain for up to 3 days. Max Daily Amount: 200 mg. Indications: Pain  Qty: 10 Tab, Refills: 0    Associated Diagnoses: Femoral artery occlusion, right ([address]         CONTINUE these medications which have NOT CHANGED    Details   amLODIPine (NORVASC) 10 mg tablet Take 10 mg by mouth daily. atorvastatin (LIPITOR) 40 mg tablet Take 40 mg by mouth nightly. guanFACINE IR (TENEX) 2 mg IR tablet Take 2 mg by mouth nightly. labetalol (NORMODYNE) 300 mg tablet Take 300 mg by mouth two (2) times a day. calcium-cholecalciferol, d3, (CALCIUM 600 + D) 600-125 mg-unit tab Take  by mouth. aspirin delayed-release 81 mg tablet Take  by mouth daily. fluoxetine (PROZAC) 40 mg capsule Take 40 mg by mouth daily.       clopidogrel (PLAVIX) 75 mg tab              Follow-up Appointments   Procedures    FOLLOW UP VISIT Appointment in: Two Weeks Call Dr. Laura Francisco office for an appt; may shower and get wounds wet in 5 days     Call Dr. Laura Francisco office for an appt; may shower and get wounds wet in 5 days     Standing Status:   Standing     Number of Occurrences:   1     Order Specific Question:   Appointment in     Answer: Two Weeks       What to do at Home:  Recommended activity: Activity as tolerated    The discharge information has been reviewed with the patient, spouse and son and DIL. The patient, spouse and son and DIL verbalized understanding. 1018 : PCA d/c with Oscar Gill RN and azar.

## 2019-07-13 NOTE — CONSULTS
Hospitalist Consultation Note    NAME: Vidya Flores   :  1950   MRN:  [id number]     Date/Time:  2019 7:52 AM    Patient PCP: Kenneth Pickens MD    I have been asked to see this patient by the attending, Dr. Annamarie Washington , for advice/opinion re: HTN. My advice is:  ________________________________________________________________________   Assessment &  Plan:  HTN  Suspect subclavian stenosis  --patient with significant difference in BP readings depending on arm this AM:  Left arm 101/57, right arm 148/58. --give amlodipine 10mg now  --repeat BP in 1 hour to decide if give labetalol. Use reading in arm with higher pressure for hold parameters. --defer evaluation for subclavian stenosis to vascular surgeon, can be done outpatient  --no change in BP meds change at this time other than hold parameters and rechecking BP in both arms next 2 times with BP checks    Hx stroke with residual right sided weakness, mild dysarthria  --continue aspirin and plavix    PAD  S/p right femoral endarterectomy POD #1  Hx right carotid endarterectomy 10/2010, s/p angioplasty and stent 2010  --continue aspirin, plavix, lipitor    Obesity  Body mass index is 32.41 kg/m². Subjective:   CHIEF COMPLAINT:  Right leg pain    HISTORY OF PRESENT ILLNESS:     Vidya Flores is a 71 y.o.  female with hx CVA, PAD, psychiatric hx, HTN, hyperlipidemia underwent right femoral endarterectomy yesterday. Consult requested this AM for HTN. BP yesterday running 542 to 618 systolic (taken in right arm), 95/63 this am on left arm. Patient reports feeling a little weak and sore in right groin. No CP, SOB, dizziness.     Past Medical History:   Diagnosis Date    Arthritis     Spine, DDD    CAD (coronary artery disease) 8 yr ago    Carotid stent on right, Left is 100% Occluded, sees Dr. Chanda Kawasaki, PVD    Depression     High cholesterol     Hypertension     Psychiatric disorder     PVD (peripheral vascular disease) with claudication (HCC)     Sleep apnea     Dx and treated with surgery without improvement    [facility] [address] 2004    right leg weakness, right arm paralysis (treated in [place])      Past Surgical History:   Procedure Laterality Date    HX GYN  1971    BTL    HX HEENT      Tonsilectomy    VASCULAR SURGERY PROCEDURE UNLIST Right     Carotid stent, Dr. Pina Kent, left Carotid 100% blocked     Social History     Tobacco Use    Smoking status: Former Smoker     Packs/day: 2.00     Years: 35.00     Pack years: 70.00     Last attempt to quit: 2003     Years since quittin.0    Smokeless tobacco: Never Used   Substance Use Topics    Alcohol use: Yes     Alcohol/week: 1.2 oz     Types: 2 Cans of beer per week      Family History   Problem Relation Age of Onset    Heart Disease Mother     Cancer Maternal Grandmother      Allergies   Allergen Reactions    Codeine Nausea Only        Prior to Admission medications    Medication Sig Start Date End Date Taking? Authorizing Provider   amLODIPine (NORVASC) 10 mg tablet Take 10 mg by mouth daily. 18  Yes Provider, Historical   atorvastatin (LIPITOR) 40 mg tablet Take 40 mg by mouth nightly. 18  Yes Provider, Historical   guanFACINE IR (TENEX) 2 mg IR tablet Take 2 mg by mouth nightly. 18  Yes Provider, Historical   labetalol (NORMODYNE) 300 mg tablet Take 300 mg by mouth two (2) times a day. 18  Yes Provider, Historical   calcium-cholecalciferol, d3, (CALCIUM 600 + D) 600-125 mg-unit tab Take  by mouth. Yes Provider, Historical   aspirin delayed-release 81 mg tablet Take  by mouth daily. Yes Provider, Historical   fluoxetine (PROZAC) 40 mg capsule Take 40 mg by mouth daily.    Yes Other, MD Alber   clopidogrel (PLAVIX) 75 mg tab  18   Provider, Historical     Current Facility-Administered Medications   Medication Dose Route Frequency    amLODIPine (NORVASC) tablet 10 mg  10 mg Oral DAILY    aspirin delayed-release tablet 81 mg  81 mg Oral DAILY    atorvastatin (LIPITOR) tablet 40 mg  40 mg Oral QHS    clopidogrel (PLAVIX) tablet 75 mg  75 mg Oral DAILY    FLUoxetine (PROzac) capsule 40 mg  40 mg Oral DAILY    labetalol (NORMODYNE) tablet 300 mg  300 mg Oral BID    acetaminophen (TYLENOL) tablet 325 mg  325 mg Oral Q4H PRN    dextrose 5% lactated ringers infusion  75 mL/hr IntraVENous CONTINUOUS    morphine  mg / 30 mL   IntraVENous TITRATE    enoxaparin (LOVENOX) injection 40 mg  40 mg SubCUTAneous Q24H    guanFACINE IR (TENEX) tablet 2 mg  2 mg Oral QHS      REVIEW OF SYSTEMS:  BOLD = POSITIVE; negative = normal text  General:  fever, chills, sweats, weakness, weight loss/gain  Eyes: blurred vision, eye pain, loss of vision, diplopia  Ear Nose and Throat: rhinorrhea, pharyngitis, otalgia, tinnitus, speech or swallowing difficulties  Respiratory: cough, sputum production, SOB, wheezing, WIN, pleuritic pain  Cardiology:   chest pain, palpitations, orthopnea, PND, edema, syncope   Gastrointestinal: abdominal pain, N/V, dysphagia, change in bowel habits, bleeding  Genitourinary: frequency, urgency, dysuria, hematuria, incontinence  Muskuloskeletal : arthralgia, myalgia  Hematology:  easy bruising, bleeding, lymphadenopathy  Dermatological: rash, ulceration, mole change, new lesion  Endocrine: hot flashes or polydipsia  Neurological:  headache, dizziness, confusion, focal weakness, paresthesia, memory loss, gait disturbance  Psychological:  anxiety, depression, agitation    Objective:   VITALS:    Visit Vitals  BP 95/63   Pulse 73   Temp 98 °F (36.7 °C)   Resp 18   Ht 5' 1\" (1.549 m)   Wt 77.8 kg (171 lb 8.3 oz)   SpO2 95%   BMI 32.41 kg/m²     Temp (24hrs), Av.2 °F (36.8 °C), Min:97.8 °F (36.6 °C), Max:98.4 °F (36.9 °C)    PHYSICAL EXAM:    General:    Alert, obese, cooperative, no distress, appears stated age.      HEENT: Atraumatic, anicteric sclerae, pink conjunctivae     No oral ulcers, mucosa moist, throat clear. Hearing intact. Neck:  Supple, symmetrical,  thyroid: non tender  Lungs:   Clear to auscultation bilaterally. No Wheezing or Rhonchi. No rales. Chest wall:  No tenderness  No Accessory muscle use. Heart:   Regular  rhythm,  2/6 holosystolic  murmur   No gallop. No edema  Abdomen:   Obese, Soft, non-tender. Not distended. Bowel sounds normal. No masses  Extremities: No cyanosis. No clubbing. Right groin incision with dried blood, mild bruising near bruising. Left foot without any discoloration  Skin:     Not pale Not Jaundiced  No rashes   Psych:  Good insight. Not depressed. Not anxious or agitated. Neurologic: EOMs intact. No facial asymmetry. No aphasia or slurred speech. Right arm contracture and weakness, Alert and oriented    ________________________________________________________________________  Care Plan discussed with:    Comments   Patient y    Family   , son, daughter bedside   RN y    Care Manager                    Consultant:      ________________________________________________________________________  TOTAL TIME:  35 minutes Minutes non procedure based  ________________________________________________________________________  Wendy Kirby MD      Procedures: see electronic medical records for all procedures/Xrays and details which were not copied into this note but were reviewed prior to creation of Plan. LAB DATA REVIEWED:    No results found for this or any previous visit (from the past 24 hour(s)).

## 2019-07-13 NOTE — PROGRESS NOTES
Vascular  VSS  BP differential left arm lower than right by 40mmHg  Feet warm  Groin without swelling  Labs ok  Home on percocet for pain  Return to see Dr. Eulalia Solomon in 2 weeks

## 2019-07-13 NOTE — PROGRESS NOTES
Problem: Falls - Risk of  Goal: *Absence of Falls  Description  Document Realis Boeck Fall Risk and appropriate interventions in the flowsheet. Outcome: Progressing Towards Goal     Problem: Pressure Injury - Risk of  Goal: *Prevention of pressure injury  Description  Document Juan Manuel Scale and appropriate interventions in the flowsheet.   Outcome: Progressing Towards Goal     Problem: Pain - Acute  Goal: *Control of acute pain  Outcome: Progressing Towards Goal

## 2019-07-13 NOTE — PROGRESS NOTES
1925: Bedside and Verbal shift change report given to [employer] (oncoming nurse) by Morgan Packer (offgoing nurse). Report included the following information SBAR, Kardex, MAR and Recent Results. PCA checked infusing per orders. 0000: VSS on RA. Will continue to monitor. 0710: Report given to [facility].

## 2019-07-19 NOTE — DISCHARGE SUMMARY
Physician Discharge Summary     Patient ID:  Bhavani Bowie  [id number]  73 y.o.  1950    Allergies: Codeine    Admit Date: 7/12/2019    Discharge Date: 7/19/2019    * Admission Diagnoses: Femoral artery occlusion, right ([facility] [address] [I70.201]  Claudication of right lower extremity ([address] [I73.9]    * Discharge Diagnoses:    Hospital Problems as of 7/13/2019 Date Reviewed: 7/12/2019          Codes Class Noted - Resolved POA    Femoral artery occlusion, right ([address] ICD-10-CM: I70.201  ICD-9-CM: 444.22  7/12/2019 - Present Unknown        Claudication of right lower extremity ([facility]) ICD-10-CM: I73.9  ICD-9-CM: 443.9  7/12/2019 - Present Unknown               Admission Condition: Good    * Discharge Condition: improved    * Procedures: Procedure(s):  RIGHT FEMORAL ENDARTERECTOMY with patch angioplasty    [facility] Course:   Normal hospital course for this procedure. Consults: Vascular Surgery    Significant Diagnostic Studies:     * Disposition: Home    Discharge Medications:   Discharge Medication List as of 7/13/2019  9:48 AM      START taking these medications    Details   traMADol (ULTRAM) 50 mg tablet Take 1 Tab by mouth every six (6) hours as needed for Pain for up to 3 days. Max Daily Amount: 200 mg. Indications: Pain, Print, Disp-10 Tab, R-0         CONTINUE these medications which have NOT CHANGED    Details   amLODIPine (NORVASC) 10 mg tablet Take 10 mg by mouth daily. , Historical Med      atorvastatin (LIPITOR) 40 mg tablet Take 40 mg by mouth nightly., Historical Med      guanFACINE IR (TENEX) 2 mg IR tablet Take 2 mg by mouth nightly., Historical Med      labetalol (NORMODYNE) 300 mg tablet Take 300 mg by mouth two (2) times a day., Historical Med      calcium-cholecalciferol, d3, (CALCIUM 600 + D) 600-125 mg-unit tab Take  by mouth., Historical Med      aspirin delayed-release 81 mg tablet Take  by mouth daily. , Historical Med      fluoxetine (PROZAC) 40 mg capsule Take 40 mg by mouth daily. Historical Med, 40 mg      clopidogrel (PLAVIX) 75 mg tab Historical Med             * Follow-up Care/Patient Instructions:   Activity: Activity as tolerated and no driving for today  Diet: Cardiac Diet  Wound Care: None needed    Follow-up Information     Follow up With Specialties Details Why Contact Info    Tani Whitley MD [place] [address]  [address] [address] [phone]          Follow-up tests/labs     Signed:  Jean Claude Nava MD  7/19/2019  10:07 AM

## 2019-08-02 RX ORDER — HYDROCODONE BITARTRATE AND ACETAMINOPHEN 10; 325 MG/1; MG/1
1 TABLET ORAL
Status: ON HOLD | COMMUNITY
End: 2019-08-09

## 2019-08-02 NOTE — PERIOP NOTES
Verified with Dr. Francisco Max assistant that patient is not to stop Plavix. Reviewed instructions with patient spouse & reviewed other pre-op instructions. Spouse verbalized understanding.

## 2019-08-02 NOTE — PERIOP NOTES
[facility]  Preoperative Instructions        Surgery Date 8/5/19          Time of Arrival 0830    1. On the day of your surgery, please report to the Surgical Services Registration Desk and sign in at your designated time. The Surgery Center is located to the right of the Emergency Room. 2. You must have someone with you to drive you home. You should not drive a car for 24 hours following surgery. Please make arrangements for a friend or family member to stay with you for the first 24 hours after your surgery. 3. Do not have anything to eat or drink (including water, gum, mints, coffee, juice) after midnight 8/4/19?? Lord Tavon ? This may not apply to medications prescribed by your physician. ?(Please note below the special instructions with medications to take the morning of your procedure.)    4. We recommend you do not drink any alcoholic beverages for 24 hours before and after your surgery. 5. Contact your surgeons office for instructions on the following medications: non-steroidal anti-inflammatory drugs (i.e. Advil, Aleve), vitamins, and supplements. (Some surgeons will want you to stop these medications prior to surgery and others may allow you to take them)  **If you are currently taking Plavix, Coumadin, Aspirin and/or other blood-thinning agents, contact your surgeon for instructions. ** Your surgeon will partner with the physician prescribing these medications to determine if it is safe to stop or if you need to continue taking. Please do not stop taking these medications without instructions from your surgeon    6. Wear comfortable clothes. Wear glasses instead of contacts. Do not bring any money or jewelry. Please bring picture ID, insurance card, and any prearranged co-payment or hospital payment. Do not wear make-up, particularly mascara the morning of your surgery. Do not wear nail polish, particularly if you are having foot /hand surgery.   Wear your hair loose or down, no ponytails, buns, kim pins or clips. All body piercings must be removed. Please shower with antibacterial soap for three consecutive days before and on the morning of surgery, but do not apply any lotions, powders or deodorants after the shower on the day of surgery. Please use a fresh towels after each shower. Please sleep in clean clothes and change bed linens the night before surgery. Please do not shave for 48 hours prior to surgery. Shaving of the face is acceptable. 7. You should understand that if you do not follow these instructions your surgery may be cancelled. If your physical condition changes (I.e. fever, cold or flu) please contact your surgeon as soon as possible. 8. It is important that you be on time. If a situation occurs where you may be late, please call [phone] (OR Holding Area). 9. If you have any questions and or problems, please call [phone] (Pre-admission Testing). 10. Your surgery time may be subject to change. You will receive a phone call the evening prior if your time changes. 11.  If having outpatient surgery, you must have someone to drive you here, stay with you during the duration of your stay, and to drive you home at time of discharge. 12.   In an effort to improve the efficiency, privacy, and safety for all of our Pre-op patients visitors are not allowed in the Holding area. Once you arrive and are registered your family/visitors will be asked to remain in the waiting room. The Pre-op staff will get you from the Surgical Waiting Area and will explain to you and your family/visitors that the Pre-op phase is beginning. The staff will answer any questions and provide instructions for tracking of the patient, by use of the existing tracking number and color-coded status board in the waiting room.   At this time the staff will also ask for your designated spokesperson information in the event that the physician or staff need to provide an update or obtain any pertinent information. The designated spokesperson will be notified if the physician needs to speak to family during the pre-operative phase. If at any time your family/visitors has questions or concerns they may approach the volunteer desk in the waiting area for assistance. Special Instructions:ASA & Plavix per surgeon. MEDICATIONS TO TAKE THE MORNING OF SURGERY WITH A SIP OF WATER:norvasc,prozac,normodyne. Norco if needed. I understand a pre-operative phone call will be made to verify my surgery time. In the event that I am not available, I give permission for a message to be left on my answering service and/or with another person? {yes @ [phone]. = spouse Jose Crum         ___________________      __________   _________    (Signature of Patient)             (Witness)                (Date and Time)

## 2019-08-05 ENCOUNTER — ANESTHESIA (OUTPATIENT)
Dept: SURGERY | Age: 69
DRG: 803 | End: 2019-08-05
Payer: MEDICARE

## 2019-08-05 ENCOUNTER — HOSPITAL ENCOUNTER (INPATIENT)
Age: 69
LOS: 9 days | Discharge: HOME HEALTH | DRG: 803 | End: 2019-08-16
Attending: SURGERY | Admitting: SURGERY
Payer: MEDICARE

## 2019-08-05 ENCOUNTER — ANESTHESIA EVENT (OUTPATIENT)
Dept: SURGERY | Age: 69
DRG: 803 | End: 2019-08-05
Payer: MEDICARE

## 2019-08-05 DIAGNOSIS — I65.21 STENOSIS OF RIGHT CAROTID ARTERY: ICD-10-CM

## 2019-08-05 DIAGNOSIS — Z86.718 HISTORY OF CEREBRAL THROMBOSIS: ICD-10-CM

## 2019-08-05 DIAGNOSIS — R56.9 CONVULSIONS, UNSPECIFIED CONVULSION TYPE (HCC): ICD-10-CM

## 2019-08-05 DIAGNOSIS — Z86.73 HISTORY OF CVA (CEREBROVASCULAR ACCIDENT): ICD-10-CM

## 2019-08-05 DIAGNOSIS — I65.23 BILATERAL CAROTID ARTERY STENOSIS: ICD-10-CM

## 2019-08-05 DIAGNOSIS — R41.82 ALTERED MENTAL STATUS, UNSPECIFIED ALTERED MENTAL STATUS TYPE: ICD-10-CM

## 2019-08-05 DIAGNOSIS — I73.9 CLAUDICATION OF RIGHT LOWER EXTREMITY (HCC): Primary | ICD-10-CM

## 2019-08-05 PROBLEM — T81.89XA LYMPHOCELE AFTER SURGICAL PROCEDURE: Status: ACTIVE | Noted: 2019-08-05

## 2019-08-05 PROBLEM — I89.8 LYMPHOCELE AFTER SURGICAL PROCEDURE: Status: ACTIVE | Noted: 2019-08-05

## 2019-08-05 PROCEDURE — 74011250636 HC RX REV CODE- 250/636: Performed by: NURSE ANESTHETIST, CERTIFIED REGISTERED

## 2019-08-05 PROCEDURE — 74011000272 HC RX REV CODE- 272: Performed by: SURGERY

## 2019-08-05 PROCEDURE — 77030002916 HC SUT ETHLN J&J -A: Performed by: SURGERY

## 2019-08-05 PROCEDURE — 77030020782 HC GWN BAIR PAWS FLX 3M -B

## 2019-08-05 PROCEDURE — 77030014369: Performed by: SURGERY

## 2019-08-05 PROCEDURE — 77030031139 HC SUT VCRL2 J&J -A: Performed by: SURGERY

## 2019-08-05 PROCEDURE — 77030011640 HC PAD GRND REM COVD -A: Performed by: SURGERY

## 2019-08-05 PROCEDURE — 77030019934 HC DRSG VAC ASST KCON -B: Performed by: SURGERY

## 2019-08-05 PROCEDURE — 76010000149 HC OR TIME 1 TO 1.5 HR: Performed by: SURGERY

## 2019-08-05 PROCEDURE — 99218 HC RM OBSERVATION: CPT

## 2019-08-05 PROCEDURE — 74011250636 HC RX REV CODE- 250/636: Performed by: SURGERY

## 2019-08-05 PROCEDURE — P9045 ALBUMIN (HUMAN), 5%, 250 ML: HCPCS | Performed by: NURSE ANESTHETIST, CERTIFIED REGISTERED

## 2019-08-05 PROCEDURE — 77030018836 HC SOL IRR NACL ICUM -A: Performed by: SURGERY

## 2019-08-05 PROCEDURE — 74011250636 HC RX REV CODE- 250/636: Performed by: ANESTHESIOLOGY

## 2019-08-05 PROCEDURE — 74011250636 HC RX REV CODE- 250/636

## 2019-08-05 PROCEDURE — 74011000250 HC RX REV CODE- 250: Performed by: ANESTHESIOLOGY

## 2019-08-05 PROCEDURE — 94760 N-INVAS EAR/PLS OXIMETRY 1: CPT

## 2019-08-05 PROCEDURE — 74011000250 HC RX REV CODE- 250: Performed by: SURGERY

## 2019-08-05 PROCEDURE — 07BH0ZZ EXCISION OF RIGHT INGUINAL LYMPHATIC, OPEN APPROACH: ICD-10-PCS | Performed by: SURGERY

## 2019-08-05 PROCEDURE — 74011000250 HC RX REV CODE- 250: Performed by: NURSE ANESTHETIST, CERTIFIED REGISTERED

## 2019-08-05 PROCEDURE — 77030010432 HC ELECTRD BALL COVD -B: Performed by: SURGERY

## 2019-08-05 PROCEDURE — 76210000000 HC OR PH I REC 2 TO 2.5 HR: Performed by: SURGERY

## 2019-08-05 PROCEDURE — 74011250637 HC RX REV CODE- 250/637: Performed by: SURGERY

## 2019-08-05 PROCEDURE — 76060000033 HC ANESTHESIA 1 TO 1.5 HR: Performed by: SURGERY

## 2019-08-05 PROCEDURE — 77030010509 HC AIRWY LMA MSK TELE -A: Performed by: NURSE ANESTHETIST, CERTIFIED REGISTERED

## 2019-08-05 RX ORDER — SODIUM CHLORIDE 0.9 % (FLUSH) 0.9 %
5-40 SYRINGE (ML) INJECTION AS NEEDED
Status: DISCONTINUED | OUTPATIENT
Start: 2019-08-05 | End: 2019-08-05 | Stop reason: HOSPADM

## 2019-08-05 RX ORDER — HYDROMORPHONE HYDROCHLORIDE 1 MG/ML
.2-.5 INJECTION, SOLUTION INTRAMUSCULAR; INTRAVENOUS; SUBCUTANEOUS
Status: DISCONTINUED | OUTPATIENT
Start: 2019-08-05 | End: 2019-08-05 | Stop reason: HOSPADM

## 2019-08-05 RX ORDER — GUANFACINE HYDROCHLORIDE 1 MG/1
2 TABLET ORAL
Status: DISCONTINUED | OUTPATIENT
Start: 2019-08-05 | End: 2019-08-16 | Stop reason: HOSPADM

## 2019-08-05 RX ORDER — CEFAZOLIN SODIUM/WATER 2 G/20 ML
2 SYRINGE (ML) INTRAVENOUS ONCE
Status: COMPLETED | OUTPATIENT
Start: 2019-08-05 | End: 2019-08-05

## 2019-08-05 RX ORDER — LIDOCAINE HYDROCHLORIDE 20 MG/ML
INJECTION, SOLUTION EPIDURAL; INFILTRATION; INTRACAUDAL; PERINEURAL AS NEEDED
Status: DISCONTINUED | OUTPATIENT
Start: 2019-08-05 | End: 2019-08-05 | Stop reason: HOSPADM

## 2019-08-05 RX ORDER — ONDANSETRON 2 MG/ML
INJECTION INTRAMUSCULAR; INTRAVENOUS AS NEEDED
Status: DISCONTINUED | OUTPATIENT
Start: 2019-08-05 | End: 2019-08-05 | Stop reason: HOSPADM

## 2019-08-05 RX ORDER — CLOPIDOGREL BISULFATE 75 MG/1
75 TABLET ORAL DAILY
Status: DISCONTINUED | OUTPATIENT
Start: 2019-08-06 | End: 2019-08-16 | Stop reason: HOSPADM

## 2019-08-05 RX ORDER — BISACODYL 5 MG
5 TABLET, DELAYED RELEASE (ENTERIC COATED) ORAL DAILY PRN
Status: DISCONTINUED | OUTPATIENT
Start: 2019-08-05 | End: 2019-08-16 | Stop reason: HOSPADM

## 2019-08-05 RX ORDER — ONDANSETRON 2 MG/ML
4 INJECTION INTRAMUSCULAR; INTRAVENOUS
Status: DISCONTINUED | OUTPATIENT
Start: 2019-08-05 | End: 2019-08-16 | Stop reason: HOSPADM

## 2019-08-05 RX ORDER — MORPHINE SULFATE IN 0.9 % NACL 150MG/30ML
PATIENT CONTROLLED ANALGESIA SYRINGE INTRAVENOUS
Status: DISCONTINUED | OUTPATIENT
Start: 2019-08-05 | End: 2019-08-07

## 2019-08-05 RX ORDER — PHENYLEPHRINE HYDROCHLORIDE 10 MG/ML
INJECTION INTRAVENOUS
Status: DISPENSED
Start: 2019-08-05 | End: 2019-08-06

## 2019-08-05 RX ORDER — PROPOFOL 10 MG/ML
INJECTION, EMULSION INTRAVENOUS AS NEEDED
Status: DISCONTINUED | OUTPATIENT
Start: 2019-08-05 | End: 2019-08-05 | Stop reason: HOSPADM

## 2019-08-05 RX ORDER — HYDRALAZINE HYDROCHLORIDE 20 MG/ML
10 INJECTION INTRAMUSCULAR; INTRAVENOUS
Status: DISCONTINUED | OUTPATIENT
Start: 2019-08-05 | End: 2019-08-16 | Stop reason: HOSPADM

## 2019-08-05 RX ORDER — ATORVASTATIN CALCIUM 40 MG/1
40 TABLET, FILM COATED ORAL
Status: DISCONTINUED | OUTPATIENT
Start: 2019-08-05 | End: 2019-08-16 | Stop reason: HOSPADM

## 2019-08-05 RX ORDER — MIDAZOLAM HYDROCHLORIDE 1 MG/ML
INJECTION, SOLUTION INTRAMUSCULAR; INTRAVENOUS AS NEEDED
Status: DISCONTINUED | OUTPATIENT
Start: 2019-08-05 | End: 2019-08-05 | Stop reason: HOSPADM

## 2019-08-05 RX ORDER — LIDOCAINE HYDROCHLORIDE 10 MG/ML
0.1 INJECTION, SOLUTION EPIDURAL; INFILTRATION; INTRACAUDAL; PERINEURAL AS NEEDED
Status: DISCONTINUED | OUTPATIENT
Start: 2019-08-05 | End: 2019-08-05 | Stop reason: HOSPADM

## 2019-08-05 RX ORDER — ASPIRIN 81 MG/1
81 TABLET ORAL DAILY
Status: DISCONTINUED | OUTPATIENT
Start: 2019-08-06 | End: 2019-08-16 | Stop reason: HOSPADM

## 2019-08-05 RX ORDER — ENOXAPARIN SODIUM 100 MG/ML
30 INJECTION SUBCUTANEOUS EVERY 24 HOURS
Status: DISCONTINUED | OUTPATIENT
Start: 2019-08-06 | End: 2019-08-16 | Stop reason: HOSPADM

## 2019-08-05 RX ORDER — SODIUM CHLORIDE 0.9 % (FLUSH) 0.9 %
5-40 SYRINGE (ML) INJECTION EVERY 8 HOURS
Status: DISCONTINUED | OUTPATIENT
Start: 2019-08-05 | End: 2019-08-05 | Stop reason: HOSPADM

## 2019-08-05 RX ORDER — FENTANYL CITRATE 50 UG/ML
50 INJECTION, SOLUTION INTRAMUSCULAR; INTRAVENOUS AS NEEDED
Status: DISCONTINUED | OUTPATIENT
Start: 2019-08-05 | End: 2019-08-05 | Stop reason: HOSPADM

## 2019-08-05 RX ORDER — MORPHINE SULFATE IN 0.9 % NACL 150MG/30ML
PATIENT CONTROLLED ANALGESIA SYRINGE INTRAVENOUS
Status: COMPLETED
Start: 2019-08-05 | End: 2019-08-05

## 2019-08-05 RX ORDER — PHENYLEPHRINE HCL IN 0.9% NACL 30MG/250ML
10-100 PLASTIC BAG, INJECTION (ML) INTRAVENOUS
Status: DISCONTINUED | OUTPATIENT
Start: 2019-08-05 | End: 2019-08-06

## 2019-08-05 RX ORDER — DEXAMETHASONE SODIUM PHOSPHATE 4 MG/ML
INJECTION, SOLUTION INTRA-ARTICULAR; INTRALESIONAL; INTRAMUSCULAR; INTRAVENOUS; SOFT TISSUE AS NEEDED
Status: DISCONTINUED | OUTPATIENT
Start: 2019-08-05 | End: 2019-08-05 | Stop reason: HOSPADM

## 2019-08-05 RX ORDER — ACETAMINOPHEN 325 MG/1
325 TABLET ORAL
Status: DISCONTINUED | OUTPATIENT
Start: 2019-08-05 | End: 2019-08-16 | Stop reason: HOSPADM

## 2019-08-05 RX ORDER — CEFAZOLIN SODIUM/WATER 2 G/20 ML
2 SYRINGE (ML) INTRAVENOUS EVERY 8 HOURS
Status: COMPLETED | OUTPATIENT
Start: 2019-08-05 | End: 2019-08-05

## 2019-08-05 RX ORDER — ALBUMIN HUMAN 50 G/1000ML
SOLUTION INTRAVENOUS AS NEEDED
Status: DISCONTINUED | OUTPATIENT
Start: 2019-08-05 | End: 2019-08-05 | Stop reason: HOSPADM

## 2019-08-05 RX ORDER — SODIUM CHLORIDE, SODIUM LACTATE, POTASSIUM CHLORIDE, CALCIUM CHLORIDE 600; 310; 30; 20 MG/100ML; MG/100ML; MG/100ML; MG/100ML
25 INJECTION, SOLUTION INTRAVENOUS CONTINUOUS
Status: DISCONTINUED | OUTPATIENT
Start: 2019-08-05 | End: 2019-08-05 | Stop reason: HOSPADM

## 2019-08-05 RX ORDER — MORPHINE SULFATE 10 MG/ML
2 INJECTION, SOLUTION INTRAMUSCULAR; INTRAVENOUS
Status: DISCONTINUED | OUTPATIENT
Start: 2019-08-05 | End: 2019-08-05 | Stop reason: HOSPADM

## 2019-08-05 RX ORDER — FENTANYL CITRATE 50 UG/ML
25 INJECTION, SOLUTION INTRAMUSCULAR; INTRAVENOUS
Status: DISCONTINUED | OUTPATIENT
Start: 2019-08-05 | End: 2019-08-05 | Stop reason: HOSPADM

## 2019-08-05 RX ORDER — DIPHENHYDRAMINE HYDROCHLORIDE 50 MG/ML
12.5 INJECTION, SOLUTION INTRAMUSCULAR; INTRAVENOUS AS NEEDED
Status: DISCONTINUED | OUTPATIENT
Start: 2019-08-05 | End: 2019-08-05 | Stop reason: HOSPADM

## 2019-08-05 RX ORDER — FLUOXETINE HYDROCHLORIDE 20 MG/1
40 CAPSULE ORAL DAILY
Status: DISCONTINUED | OUTPATIENT
Start: 2019-08-06 | End: 2019-08-16 | Stop reason: HOSPADM

## 2019-08-05 RX ORDER — AMLODIPINE BESYLATE 5 MG/1
10 TABLET ORAL DAILY
Status: DISCONTINUED | OUTPATIENT
Start: 2019-08-06 | End: 2019-08-16 | Stop reason: HOSPADM

## 2019-08-05 RX ORDER — ONDANSETRON 2 MG/ML
4 INJECTION INTRAMUSCULAR; INTRAVENOUS AS NEEDED
Status: DISCONTINUED | OUTPATIENT
Start: 2019-08-05 | End: 2019-08-05 | Stop reason: HOSPADM

## 2019-08-05 RX ORDER — EPHEDRINE SULFATE/0.9% NACL/PF 50 MG/5 ML
SYRINGE (ML) INTRAVENOUS AS NEEDED
Status: DISCONTINUED | OUTPATIENT
Start: 2019-08-05 | End: 2019-08-05 | Stop reason: HOSPADM

## 2019-08-05 RX ORDER — BUPIVACAINE HYDROCHLORIDE 2.5 MG/ML
INJECTION, SOLUTION EPIDURAL; INFILTRATION; INTRACAUDAL AS NEEDED
Status: DISCONTINUED | OUTPATIENT
Start: 2019-08-05 | End: 2019-08-05 | Stop reason: HOSPADM

## 2019-08-05 RX ORDER — PHENYLEPHRINE HCL IN 0.9% NACL 0.4MG/10ML
SYRINGE (ML) INTRAVENOUS AS NEEDED
Status: DISCONTINUED | OUTPATIENT
Start: 2019-08-05 | End: 2019-08-05 | Stop reason: HOSPADM

## 2019-08-05 RX ORDER — HYDROCODONE BITARTRATE AND ACETAMINOPHEN 10; 325 MG/1; MG/1
1 TABLET ORAL
Status: DISCONTINUED | OUTPATIENT
Start: 2019-08-05 | End: 2019-08-08

## 2019-08-05 RX ORDER — KETOROLAC TROMETHAMINE 30 MG/ML
INJECTION, SOLUTION INTRAMUSCULAR; INTRAVENOUS AS NEEDED
Status: DISCONTINUED | OUTPATIENT
Start: 2019-08-05 | End: 2019-08-05 | Stop reason: HOSPADM

## 2019-08-05 RX ORDER — FENTANYL CITRATE 50 UG/ML
INJECTION, SOLUTION INTRAMUSCULAR; INTRAVENOUS AS NEEDED
Status: DISCONTINUED | OUTPATIENT
Start: 2019-08-05 | End: 2019-08-05 | Stop reason: HOSPADM

## 2019-08-05 RX ORDER — DEXTROSE, SODIUM CHLORIDE, SODIUM LACTATE, POTASSIUM CHLORIDE, AND CALCIUM CHLORIDE 5; .6; .31; .03; .02 G/100ML; G/100ML; G/100ML; G/100ML; G/100ML
75 INJECTION, SOLUTION INTRAVENOUS CONTINUOUS
Status: DISCONTINUED | OUTPATIENT
Start: 2019-08-05 | End: 2019-08-06

## 2019-08-05 RX ORDER — MIDAZOLAM HYDROCHLORIDE 1 MG/ML
0.5 INJECTION, SOLUTION INTRAMUSCULAR; INTRAVENOUS
Status: DISCONTINUED | OUTPATIENT
Start: 2019-08-05 | End: 2019-08-05 | Stop reason: HOSPADM

## 2019-08-05 RX ADMIN — DEXAMETHASONE SODIUM PHOSPHATE 4 MG: 4 INJECTION, SOLUTION INTRAMUSCULAR; INTRAVENOUS at 11:27

## 2019-08-05 RX ADMIN — Medication 80 MCG: at 11:15

## 2019-08-05 RX ADMIN — GUANFACINE HYDROCHLORIDE 2 MG: 1 TABLET ORAL at 21:05

## 2019-08-05 RX ADMIN — Medication 80 MCG: at 11:24

## 2019-08-05 RX ADMIN — Medication: at 12:27

## 2019-08-05 RX ADMIN — ATORVASTATIN CALCIUM 40 MG: 40 TABLET, FILM COATED ORAL at 21:03

## 2019-08-05 RX ADMIN — Medication 20 MG: at 11:02

## 2019-08-05 RX ADMIN — Medication 120 MCG: at 11:29

## 2019-08-05 RX ADMIN — MIDAZOLAM HYDROCHLORIDE 2 MG: 1 INJECTION INTRAMUSCULAR; INTRAVENOUS at 10:42

## 2019-08-05 RX ADMIN — LABETALOL HYDROCHLORIDE 300 MG: 200 TABLET, FILM COATED ORAL at 17:20

## 2019-08-05 RX ADMIN — PROPOFOL 150 MG: 10 INJECTION, EMULSION INTRAVENOUS at 10:47

## 2019-08-05 RX ADMIN — Medication 2 G: at 21:03

## 2019-08-05 RX ADMIN — Medication 20 MG: at 10:57

## 2019-08-05 RX ADMIN — SODIUM CHLORIDE, SODIUM LACTATE, POTASSIUM CHLORIDE, AND CALCIUM CHLORIDE: 600; 310; 30; 20 INJECTION, SOLUTION INTRAVENOUS at 11:54

## 2019-08-05 RX ADMIN — Medication 5 MG: at 11:07

## 2019-08-05 RX ADMIN — SODIUM CHLORIDE, SODIUM LACTATE, POTASSIUM CHLORIDE, CALCIUM CHLORIDE, AND DEXTROSE MONOHYDRATE 75 ML/HR: 600; 310; 30; 20; 5 INJECTION, SOLUTION INTRAVENOUS at 12:27

## 2019-08-05 RX ADMIN — Medication 2 G: at 17:46

## 2019-08-05 RX ADMIN — PHENYLEPHRINE HYDROCHLORIDE 40 MCG/MIN: 10 INJECTION INTRAVENOUS at 11:35

## 2019-08-05 RX ADMIN — Medication 75 MCG/MIN: at 12:06

## 2019-08-05 RX ADMIN — KETOROLAC TROMETHAMINE 30 MG: 30 INJECTION, SOLUTION INTRAMUSCULAR; INTRAVENOUS at 11:27

## 2019-08-05 RX ADMIN — Medication 120 MCG: at 11:21

## 2019-08-05 RX ADMIN — SODIUM CHLORIDE, SODIUM LACTATE, POTASSIUM CHLORIDE, AND CALCIUM CHLORIDE 25 ML/HR: 600; 310; 30; 20 INJECTION, SOLUTION INTRAVENOUS at 09:21

## 2019-08-05 RX ADMIN — Medication 2 G: at 10:42

## 2019-08-05 RX ADMIN — FENTANYL CITRATE 50 MCG: 50 INJECTION, SOLUTION INTRAMUSCULAR; INTRAVENOUS at 10:47

## 2019-08-05 RX ADMIN — LIDOCAINE HYDROCHLORIDE 40 MG: 20 INJECTION, SOLUTION EPIDURAL; INFILTRATION; INTRACAUDAL; PERINEURAL at 10:47

## 2019-08-05 RX ADMIN — ALBUMIN (HUMAN) 250 ML: 2.5 SOLUTION INTRAVENOUS at 11:22

## 2019-08-05 RX ADMIN — ONDANSETRON HYDROCHLORIDE 4 MG: 2 INJECTION, SOLUTION INTRAMUSCULAR; INTRAVENOUS at 11:27

## 2019-08-05 NOTE — PERIOP NOTES
Pt alert and oriented . Speech clear. Right and left temporal pulse palp. Smile and tongue pertrussion symmetrical right and left radial pulse palp. Grasp weak to right strong on left. Pt right arm contracted right brisk cap refill   and left dp an pt pulse palp. Brisk cap refill.

## 2019-08-05 NOTE — ANESTHESIA POSTPROCEDURE EVALUATION
Procedure(s):  REPAIR OF RIGHT GROIN LYMPHOCELE. general    Anesthesia Post Evaluation        Patient location during evaluation: PACU  Note status: Adequate. Level of consciousness: responsive to verbal stimuli and sleepy but conscious  Pain management: satisfactory to patient  Airway patency: patent  Anesthetic complications: no  Cardiovascular status: acceptable  Respiratory status: acceptable  Hydration status: acceptable  Comments: +Post-Anesthesia Evaluation and Assessment    Patient: Liliane Grant MRN: [id number]  SSN: [id number]   YOB: 1950  Age: 71 y.o. Sex: female      Cardiovascular Function/Vital Signs    BP 91/50 (BP 1 Location: Left arm, BP Patient Position: At rest)   Pulse 82   Temp 36.8 °C (98.2 °F)   Resp 20   Ht 5' 0.5\" (1.537 m)   Wt 75.7 kg (166 lb 14.2 oz)   SpO2 95%   BMI 32.06 kg/m²     Patient is status post Procedure(s):  REPAIR OF RIGHT GROIN LYMPHOCELE. Nausea/Vomiting: Controlled. Postoperative hydration reviewed and adequate. Pain:  Pain Scale 1: Numeric (0 - 10) (08/05/19 1325)  Pain Intensity 1: 0 (08/05/19 1325)   Managed. Neurological Status:   Neuro (WDL): Within Defined Limits (08/05/19 1158)   At baseline. Mental Status and Level of Consciousness: Arousable. Pulmonary Status:   O2 Device: Nasal cannula (08/05/19 1350)   Adequate oxygenation and airway patent. Complications related to anesthesia: None    Post-anesthesia assessment completed. No concerns. Signed By: Allison Nowak MD    8/5/2019  Post anesthesia nausea and vomiting:  controlled      Vitals Value Taken Time   BP 91/50 8/5/2019  1:50 PM   Temp 36.8 °C (98.2 °F) 8/5/2019 12:05 PM   Pulse 79 8/5/2019  1:54 PM   Resp 16 8/5/2019  1:54 PM   SpO2 94 % 8/5/2019  1:54 PM   Vitals shown include unvalidated device data.

## 2019-08-05 NOTE — PROGRESS NOTES
Handoff Report from Operating Room to PACU    Report received from EAMON Cunha RN and SEVERINO Fernando CRNA regarding [employer]. Surgeon(s):  Adelina Ivy MD  And Procedure(s) (LRB):  REPAIR OF RIGHT GROIN LYMPHOCELE (Right)  confirmed   with allergies, drains and dressings discussed. Anesthesia type, drugs, patient history, complications, estimated blood loss, vital signs, intake and output, and last pain medication, lines, reversal medications and temperature were reviewed.

## 2019-08-05 NOTE — ROUTINE PROCESS
Bedside and Verbal shift change report given to Arvind restrepo RN (oncoming nurse) by Koko Robison RN (offgoing nurse). Report included the following information SBAR, Kardex, Intake/Output and MAR.

## 2019-08-05 NOTE — BRIEF OP NOTE
BRIEF OPERATIVE NOTE    Date of Procedure: 8/5/2019   Preoperative Diagnosis: RIGHT GROIN LYMPHOCELE  Postoperative Diagnosis: RIGHT GROIN LYMPHOCELE    Procedure(s):  REPAIR OF RIGHT GROIN LYMPHOCELE  Surgeon(s) and Role:     * Tyler Rodriguez MD - Primary         Surgical Assistant: Aubrey Ng    Surgical Staff:  Circ-1: Aditi Hernandez RN  Scrub Tech-1: Georga Bunk; Corinne Hitt  Surg Asst-1: Arthuro Ilfeld  Event Time In Time Out   Incision Start 1106    Incision Close       Anesthesia: General   Estimated Blood Loss: min  Specimens: * No specimens in log *   Findings: lymlh l;eake   Complications: 0  Implants: * No implants in log *

## 2019-08-05 NOTE — PROGRESS NOTES
TRANSFER - OUT REPORT:    Verbal report given to Cristofer Cason RN(name) on Marley Atkins  being transferred to 2113(unit) for routine progression of care       Report consisted of patients Situation, Background, Assessment and   Recommendations(SBAR). Information from the following report(s) SBAR, OR Summary, Procedure Summary, Intake/Output and MAR was reviewed with the receiving nurse. Opportunity for questions and clarification was provided.       Patient transported with:   O2 @ 1 liters  Tech

## 2019-08-05 NOTE — ROUTINE PROCESS
TRANSFER - IN REPORT:    Verbal report received from LUCIEN Kyle (name) on Wilman Colvin  being received from PACU (unit) for routine post - op      Report consisted of patients Situation, Background, Assessment and   Recommendations(SBAR). Information from the following report(s) SBAR, Kardex, OR Summary, Intake/Output and MAR was reviewed with the receiving nurse. Opportunity for questions and clarification was provided. Assessment completed upon patients arrival to unit and care assumed.

## 2019-08-05 NOTE — PROGRESS NOTES
Pharmacy - Enoxaparin (Lovenox®) Monitoring      Indication: DVT prophylaxis     Current Dose: Enoxaparin 40 mg subcutaneously every 24 hours    Creatinine Clearance (mL/min): 27 ml/min    Labs:  No results for input(s): CREA, HGB, PLT, INR, HGBEXT, PLTEXT in the last 72 hours.     No lab exists for component: INREXT  Wt Readings from Last 1 Encounters:   08/05/19 75.7 kg (166 lb 14.2 oz)     Ht Readings from Last 1 Encounters:   08/05/19 153.7 cm (60.5\")       Impression/Plan:   Change to enoxaparin 30 mg subcutaneously every 24 hours per DVT prophylaxis protocol       Thanks,  Randell Rosenberg, JakiD

## 2019-08-05 NOTE — CONSULTS
Hospitalist Consultation Note    NAME:  Stiven Martínez   :   1950   MRN:   [id number]     ATTENDING: Lisa Partida MD  PCP:  Mary Moulton MD    Date/Time:  2019 5:10 PM      Recommendations/Plan:     Hypertension  -Continue with home meds of Norvasc and Labetalol  -PRN Hydralazine orders placed  -BP seems relatively well controlled - certainly pt being on a morphine PCA pump, indicating that she requires significant medication administration for pain management will impact pt's BP control    CAD  HLD  Depression  CVA  -Have reviewed pt's PTA med list and agree with current in hospital medication orders    Active Problems:    Lymphocele after surgical procedure (2019)    -As per Primary Team    Code Status: Per primary team  DVT Prophylaxis: Per primary team        Subjective:   REQUESTING PHYSICIAN: Dr. Brionna Mao: HTN  Shaina Goodman is a 71 y.o.  female who I was asked to see for HTN management. She is admitted to Dr. Yee Willard service and had surgical treatment for right groin lymphocele. She states that she is medication compliant. She states that BP is normally well controlled. She denies any SOB, CP, fever or chills.     Past Medical History:   Diagnosis Date    Adverse effect of anesthesia     sleep apnea does not use cpap machine      Arthritis     Spine, DDD    CAD (coronary artery disease) 8 yr ago    Carotid stent on right, Left is 100% Occluded, sees Dr. Quan Ng, PVD    Depression     High cholesterol     Hypertension     Psychiatric disorder     PVD (peripheral vascular disease) with claudication ([facility] [address]     Sleep apnea     Dx and treated with surgery without improvement    Stroke ([facility] [address] 2004    right leg weakness, right arm paralysis (treated in [place])      Past Surgical History:   Procedure Laterality Date    HX GYN      BTL    HX HEENT      Tonsilectomy    VASCULAR SURGERY PROCEDURE UNLIST Right     Carotid stent, Dr. Quan Ng, left Carotid 100% blocked     Social History     Tobacco Use    Smoking status: Former Smoker     Packs/day: 2.00     Years: 35.00     Pack years: 70.00     Last attempt to quit: 2003     Years since quittin.0    Smokeless tobacco: Never Used   Substance Use Topics    Alcohol use: Yes     Alcohol/week: 2.0 standard drinks     Types: 2 Cans of beer per week      Family History   Problem Relation Age of Onset    Heart Disease Mother     Cancer Maternal Grandmother        Allergies   Allergen Reactions    Tylenol [Acetaminophen] Unknown (comments)     States cannot take this has problems cannot remember reaction       Codeine Nausea Only      Prior to Admission medications    Medication Sig Start Date End Date Taking? Authorizing Provider   HYDROcodone-acetaminophen (NORCO)  mg tablet Take 1 Tab by mouth. Yes Provider, Historical   amLODIPine (NORVASC) 10 mg tablet Take 10 mg by mouth daily. 18  Yes Provider, Historical   atorvastatin (LIPITOR) 40 mg tablet Take 40 mg by mouth nightly. 18  Yes Provider, Historical   clopidogrel (PLAVIX) 75 mg tab  18  Yes Provider, Historical   guanFACINE IR (TENEX) 2 mg IR tablet Take 2 mg by mouth nightly. 18  Yes Provider, Historical   labetalol (NORMODYNE) 300 mg tablet Take 300 mg by mouth two (2) times a day. 18  Yes Provider, Historical   calcium-cholecalciferol, d3, (CALCIUM 600 + D) 600-125 mg-unit tab Take  by mouth. Yes Provider, Historical   aspirin delayed-release 81 mg tablet Take  by mouth daily. Yes Provider, Historical   fluoxetine (PROZAC) 40 mg capsule Take 40 mg by mouth daily. Yes Other, MD Alber       REVIEW OF SYSTEMS:     Total of 12 systems reviewed as follows:   I am not able to complete the review of systems because:    The patient is intubated and sedated    The patient has altered mental status due to his acute medical problems    The patient has baseline aphasia from prior stroke(s)    The patient has baseline dementia and is not reliable historian                 POSITIVE= underlined text  Negative = text not underlined  General:  fever, chills, sweats, generalized weakness, weight loss/gain,      loss of appetite   Eyes:    blurred vision, eye pain, loss of vision, double vision  ENT:    rhinorrhea, pharyngitis   Respiratory:   cough, sputum production, SOB, wheezing, WIN, pleuritic pain   Cardiology:   chest pain, palpitations, orthopnea, PND, edema, syncope   Gastrointestinal:  abdominal pain , N/V, dysphagia, diarrhea, constipation, bleeding   Genitourinary:  frequency, urgency, dysuria, hematuria, incontinence   Muskuloskeletal :  arthralgia, myalgia   Hematology:  easy bruising, nose or gum bleeding, lymphadenopathy   Dermatological: rash, ulceration, pruritis   Endocrine:   hot flashes or polydipsia   Neurological:  headache, dizziness, confusion, focal weakness, paresthesia,     Speech difficulties, memory loss, gait disturbance  Psychological: Feelings of anxiety, depression, agitation    Objective:   VITALS:    Visit Vitals  /69 (BP 1 Location: Right arm, BP Patient Position: At rest)   Pulse 76   Temp 98.4 °F (36.9 °C)   Resp 18   Ht 5' 0.5\" (1.537 m)   Wt 75.7 kg (166 lb 14.2 oz)   SpO2 94%   BMI 32.06 kg/m²     Temp (24hrs), Av.3 °F (36.8 °C), Min:98.2 °F (36.8 °C), Max:98.5 °F (36.9 °C)      PHYSICAL EXAM:   General:    Alert, cooperative, no distress, appears stated age. HEENT: Atraumatic, anicteric sclerae, pink conjunctivae     No oral ulcers, mucosa moist, throat clear  Neck:  Supple, symmetrical,  thyroid: non tender  Lungs:   Clear to auscultation bilaterally. No Wheezing or Rhonchi. No rales. Chest wall:  No tenderness  No Accessory muscle use. Heart:   Regular  rhythm,  No  murmur   No edema  Abdomen:   Soft, non-tender. Not distended. Bowel sounds normal  :  Wound vac, surgical dressing R groin  Extremities: No cyanosis. No clubbing  Skin:     Not pale.   Not Jaundiced No rashes   Psych:  Good insight. Not depressed. Not anxious or agitated. Neurologic: EOMs intact. No facial asymmetry. No aphasia or slurred speech. Symmetrical strength, Alert and oriented X 4.     _______________________________________________________________________  Care Plan discussed with:    Comments   Patient x    Family      RN x    Care Manager                    Consultant:      ____________________________________________________________________  TOTAL TIME:     45 mins    Comments    x Reviewed previous records   >50% of visit spent in counseling and coordination of care  Discussion with patient and/or family and questions answered       Critical Care Provided     Minutes non procedure based  ________________________________________________________________________  Signed: Aries Jones MD      Procedures: see electronic medical records for all procedures/Xrays and details which were not copied into this note but were reviewed prior to creation of Plan. LAB DATA REVIEWED:    No results found for this or any previous visit (from the past 24 hour(s)).     _____________________________  Hospitalist: Aries Jones MD

## 2019-08-05 NOTE — ANESTHESIA PREPROCEDURE EVALUATION
Relevant Problems   No relevant active problems       Anesthetic History   No history of anesthetic complications            Review of Systems / Medical History  Patient summary reviewed, nursing notes reviewed and pertinent labs reviewed    Pulmonary        Sleep apnea  Smoker         Neuro/Psych       CVA  TIA and psychiatric history    Comments: Depression  Cardiovascular    Hypertension          CAD, PAD and hyperlipidemia    Exercise tolerance: >4 METS  Comments: Carotid artery stent     GI/Hepatic/Renal  Within defined limits              Endo/Other        Obesity and arthritis     Other Findings   Comments: RIGHT GROIN LYMPHOCELE  DDD           Physical Exam    Airway  Mallampati: III  TM Distance: 4 - 6 cm  Neck ROM: normal range of motion   Mouth opening: Diminished (comment)     Cardiovascular  Regular rate and rhythm,  S1 and S2 normal,  no murmur, click, rub, or gallop             Dental  No notable dental hx       Pulmonary  Breath sounds clear to auscultation               Abdominal  GI exam deferred       Other Findings            Anesthetic Plan    ASA: 3  Anesthesia type: general    Monitoring Plan: BIS        Anesthetic plan and risks discussed with: Patient

## 2019-08-06 PROCEDURE — 94760 N-INVAS EAR/PLS OXIMETRY 1: CPT

## 2019-08-06 PROCEDURE — 74011250637 HC RX REV CODE- 250/637: Performed by: NEUROMUSCULOSKELETAL MEDICINE & OMM

## 2019-08-06 PROCEDURE — 74011250636 HC RX REV CODE- 250/636: Performed by: NURSE PRACTITIONER

## 2019-08-06 PROCEDURE — 74011250637 HC RX REV CODE- 250/637: Performed by: SURGERY

## 2019-08-06 PROCEDURE — 99218 HC RM OBSERVATION: CPT

## 2019-08-06 PROCEDURE — 74011250636 HC RX REV CODE- 250/636: Performed by: SURGERY

## 2019-08-06 RX ORDER — SODIUM CHLORIDE 9 MG/ML
25 INJECTION, SOLUTION INTRAVENOUS CONTINUOUS
Status: DISCONTINUED | OUTPATIENT
Start: 2019-08-06 | End: 2019-08-07

## 2019-08-06 RX ORDER — LANOLIN ALCOHOL/MO/W.PET/CERES
1.5 CREAM (GRAM) TOPICAL
Status: DISCONTINUED | OUTPATIENT
Start: 2019-08-06 | End: 2019-08-08

## 2019-08-06 RX ADMIN — FLUOXETINE 40 MG: 20 CAPSULE ORAL at 09:24

## 2019-08-06 RX ADMIN — MELATONIN 1.5 MG: at 21:17

## 2019-08-06 RX ADMIN — LABETALOL HYDROCHLORIDE 300 MG: 200 TABLET, FILM COATED ORAL at 18:00

## 2019-08-06 RX ADMIN — Medication: at 07:14

## 2019-08-06 RX ADMIN — ATORVASTATIN CALCIUM 40 MG: 40 TABLET, FILM COATED ORAL at 21:18

## 2019-08-06 RX ADMIN — SODIUM CHLORIDE 25 ML/HR: 900 INJECTION, SOLUTION INTRAVENOUS at 12:56

## 2019-08-06 RX ADMIN — SODIUM CHLORIDE, SODIUM LACTATE, POTASSIUM CHLORIDE, CALCIUM CHLORIDE, AND DEXTROSE MONOHYDRATE 75 ML/HR: 600; 310; 30; 20; 5 INJECTION, SOLUTION INTRAVENOUS at 09:25

## 2019-08-06 RX ADMIN — LABETALOL HYDROCHLORIDE 300 MG: 200 TABLET, FILM COATED ORAL at 09:24

## 2019-08-06 RX ADMIN — AMLODIPINE BESYLATE 10 MG: 5 TABLET ORAL at 09:24

## 2019-08-06 RX ADMIN — CLOPIDOGREL BISULFATE 75 MG: 75 TABLET ORAL at 09:24

## 2019-08-06 RX ADMIN — ASPIRIN 81 MG: 81 TABLET, COATED ORAL at 09:24

## 2019-08-06 RX ADMIN — GUANFACINE HYDROCHLORIDE 2 MG: 1 TABLET ORAL at 21:21

## 2019-08-06 RX ADMIN — ENOXAPARIN SODIUM 30 MG: 30 INJECTION, SOLUTION INTRAVENOUS; SUBCUTANEOUS at 05:21

## 2019-08-06 NOTE — OP NOTES
[address]  OPERATIVE REPORT    Name:  Ayanna Quigley  MR#:  [id number]  :  1950  ACCOUNT #:  [de-identified]  DATE OF SERVICE:  2019      PREOPERATIVE DIAGNOSIS:  Lymphocele, right groin. POSTOPERATIVE DIAGNOSIS:  Lymphocele, right groin. PROCEDURE PERFORMED:  Evacuation of lymphocele with debridement. SURGEON:  MD Lucrecia Jaquez [facility]. ANESTHESIA:  General.    COMPLICATIONS:  None. SPECIMENS REMOVED:  None. IMPLANTS:  7 mm DANA drain and wound VAC. ESTIMATED BLOOD LOSS:  Minimal.    PROCEDURE:  With the patient supine on the operating table and after general anesthesia was induced, the right groin was prepared as a sterile field. The inflamed old wound was excised. The wound was opened. Old proteinaceous debris was debrided. There was very little active bleeding. There was no visible lymph leak. The wound was irrigated with antibiotic solution. The entire wound was cauterized briskly with electrocautery. A 7 mm DANA drain was brought through a separate stab wound made in the base of the wound. The subcutaneous tissue was then closed in 3 layers of continuous running locked suture utilizing 2-0 Vicryl. The skin was then closed over with a running locked suture of 2-0 nylon. A wound VAC was applied over the wound itself. The patient returned to recovery area in guarded condition.         Yolette Otto MD      [id number]  D:  2019 11:49  T:  2019 12:58  JOB #:  [id number]  CC:  Jarrod Jara MD

## 2019-08-06 NOTE — PROGRESS NOTES
General Surgery End of Shift Nursing Note    Bedside shift change report given to Chhaya (oncoming nurse) by Mina Garcia (offgoing nurse). Report included the following information SBAR, Kardex, Intake/Output and MAR. Shift worked:   7p to [employer] of shift:  Pt has been resting throughout the night. Pt has been getting up to the [facility] with one assist d/t multiple cords attached to pt. Wound vac has had no output. DANA drain draining.  Pain has been tolerated with pca   Issues for physician to address:  none       Deena Casey, RN

## 2019-08-06 NOTE — PROGRESS NOTES
Reason for Admission:  Lymphocele after surgical procedure                    RRAT Score:   9                  Plan for utilizing home health: Yes                         Current Advanced Directive/Advance Care Plan: Not on file                         Transition of Care Plan:   Patient is independent with ADL/IADL to include driving. Patient admits to past Rehab, about 15yrs ago at [facility]. Patient denies past [place] and DME use. Patients support system is . Patient has two daughters that live in [place].     PCP- Dr Anibal Hensley Management Interventions  PCP Verified by CM: Yes(Dr Appiah Courser)  Mode of Transport at Discharge:  Other (see comment)()  Transition of Care Consult (CM Consult): Discharge Planning  Discharge Durable Medical Equipment: No(No DME use)  Physical Therapy Consult: No  Occupational Therapy Consult: No  Speech Therapy Consult: No  Current Support Network: Lives with Spouse, Own Home(Lives in a two story home with bedroom on first floor         )  Confirm Follow Up Transport: Self  Plan discussed with Pt/Family/Caregiver: Yes( assisted with info verification)  Discharge Location  Discharge Placement: (Anticipate home with HH)      Abhilash Thapa  Ext [phone]           ANA Plan:     *Home with  [facility] with wound vac   *waiting for [employer] form to be completed

## 2019-08-06 NOTE — PROGRESS NOTES
Vascular Surgery Progress Note  Randy Friend ACNP-BC  8/6/2019       Subjective:   Ms. Tien Mancini has a pmhx signficant for ASHD, HTN, HLD, CKD, CVA w residual right hemiparesis, carotid stenosis, BRENDAN, morbid obesity, and right femoral artery occlusion. She is s/p right common femoral, profunda, and superficial femoral endarterectomy with patch and angioplasty on 07/12/2019. Her post procedure course has been complicated by a right groin lymphatic leak. She is now admitted to the hospital s/p evacuation of lymphocele with debridement and wound vac placement on 08/05/2019. This am her pain is controlled. Her vac is intact. Her DANA drain is intact with minimal drainage. Overnight she was hypotensive.   That has since improved.       Nursing Data:     Patient Vitals for the past 24 hrs:   BP Temp Pulse Resp SpO2   08/06/19 0747 158/80 97.6 °F (36.4 °C) 82 18 96 %   08/06/19 0235 149/52 97.9 °F (36.6 °C) 75 18 95 %   08/05/19 2244 145/55 97.9 °F (36.6 °C) 73 18 93 %   08/05/19 1922 146/54 98 °F (36.7 °C) 77 18 95 %   08/05/19 1645 148/69 98.4 °F (36.9 °C) 76 18 94 %   08/05/19 1616 141/65 98.5 °F (36.9 °C) 75 18 95 %   08/05/19 1546 134/64 98.2 °F (36.8 °C) 78 18 94 %   08/05/19 1519 156/67 98.2 °F (36.8 °C) 77 18 93 %   08/05/19 1455 131/48 98.4 °F (36.9 °C) 78 20 94 %   08/05/19 1445 141/50 -- 79 20 93 %   08/05/19 1430 138/49 -- 79 17 93 %   08/05/19 1415 133/46 -- 78 13 96 %   08/05/19 1407 137/45 -- 79 19 93 %   08/05/19 1401 (!) 87/51 -- 77 17 94 %   08/05/19 1350 91/50 -- 82 20 95 %   08/05/19 1340 90/51 -- 76 17 94 %   08/05/19 1325 93/51 -- 75 16 92 %   08/05/19 1315 93/47 -- 76 17 92 %   08/05/19 1300 98/53 -- 75 14 95 %   08/05/19 1245 110/59 -- 75 14 96 %   08/05/19 1230 96/61 -- 77 18 94 %   08/05/19 1215 91/54 -- 79 15 93 %   08/05/19 1212 (!) 87/52 -- 79 16 91 %   08/05/19 1205 (!) 79/38 98.2 °F (36.8 °C) 80 17 91 %   08/05/19 1200 (!) 81/54 -- 79 21 93 % ---------------------------------------------------------------------------------------------------------    Intake/Output Summary (Last 24 hours) at 8/6/2019 1105  Last data filed at 8/6/2019 0842  Gross per 24 hour   Intake 2267.5 ml   Output 2350 ml   Net -82.5 ml       Exam:     Physical Exam   Constitutional: She is oriented to person, place, and time. She appears well-developed and well-nourished. HENT:   Head: Normocephalic. Eyes: Pupils are equal, round, and reactive to light. Neck: Normal range of motion. Cardiovascular: Normal rate and regular rhythm. Pulses:       Femoral pulses are 2+ on the left side. Right foot is warm and well perfused. Pulmonary/Chest: Effort normal. No respiratory distress. Abdominal: Soft. She exhibits no distension. Musculoskeletal:   Right hemiplegia w contracture of RUE    Neurological: She is alert and oriented to person, place, and time. Skin:        Psychiatric: Her behavior is normal. Judgment and thought content normal. Her mood appears anxious. Lab Review:     . No results found for this or any previous visit (from the past 24 hour(s)). Assessment/Plan:      Principal problem:  Lymphocele   -s/p evacuation of lymphocele with debridement and wound vac placement on 08/05/2019  PAD  -right common femoral, profunda, and superficial femoral endarterectomy with patch and angioplasty on 07/12/2019    Stable this am from a vascular standpoint. Continue wound vac and DANA drain. Encourage PT/OOB to chair/IS. Continue current pain regimen.       Active problems   ASHD  -continue ASA, Plavix, statin and BBlocker   Post procedural hypotension with hx of HTN  -hypotension resolved   -CCB and BBlocker resumed   HLD  -continue statin   CKD III  -baseline creatinine 1.44  Hx of CVA with residual right hemiparesis   Carotid stenosis   BRENDAN  ADHD   Anxiety   Morbid obesity   Appreciate input from hospitalist.     VTE prophylaxis:  LMWH     Disposition: Likely home with HH and wound vac.

## 2019-08-06 NOTE — PROGRESS NOTES
VSS. Minimal use of morphine PCA. [address] [place] [address] in place, no drainage noted. Pt and  instructed on home DANA care. Pt not able to manage DANA independently d/t right hand weakness, however  able to demonstrate while patient assisted. Continue to monitor.

## 2019-08-06 NOTE — PROGRESS NOTES
Hospitalist Progress Note    NAME: Salome Ortega   :  1950   MRN:  [id number]       Assessment / Plan:  Hypertension  -Continue with home meds of Norvasc and Labetalol    CAD  HLD  Depression  CVA  -cont plavix, lipitor and prozac     Insomnia - melatonin prn. Active Problems:    Lymphocele after surgical procedure (2019)            Subjective:     Chief Complaint / Reason for Physician Visit  \"no reports of pain or chills. Didn't sleep last evening. \". Discussed with RN events overnight. Review of Systems:  Symptom Y/N Comments  Symptom Y/N Comments   Fever/Chills    Chest Pain     Poor Appetite    Edema     Cough    Abdominal Pain     Sputum    Joint Pain     SOB/WIN    Pruritis/Rash     Nausea/vomit    Tolerating PT/OT     Diarrhea    Tolerating Diet     Constipation    Other       Could NOT obtain due to:      Objective:     VITALS:   Last 24hrs VS reviewed since prior progress note.  Most recent are:  Patient Vitals for the past 24 hrs:   Temp Pulse Resp BP SpO2   19 0747 97.6 °F (36.4 °C) 82 18 158/80 96 %   19 0235 97.9 °F (36.6 °C) 75 18 149/52 95 %   19 2244 97.9 °F (36.6 °C) 73 18 145/55 93 %   19 1922 98 °F (36.7 °C) 77 18 146/54 95 %   19 1645 98.4 °F (36.9 °C) 76 18 148/69 94 %   19 1616 98.5 °F (36.9 °C) 75 18 141/65 95 %   19 1546 98.2 °F (36.8 °C) 78 18 134/64 94 %   19 1519 98.2 °F (36.8 °C) 77 18 156/67 93 %   19 1455 98.4 °F (36.9 °C) 78 20 131/48 94 %   19 1445 -- 79 20 141/50 93 %   19 1430 -- 79 17 138/49 93 %   19 1415 -- 78 13 133/46 96 %   19 1407 -- 79 19 137/45 93 %   19 1401 -- 77 17 (!) 87/51 94 %   19 1350 -- 82 20 91/50 95 %   19 1340 -- 76 17 90/51 94 %   19 1325 -- 75 16 93/51 92 %   19 1315 -- 76 17 93/47 92 %   19 1300 -- 75 14 98/53 95 %   19 1245 -- 75 14 110/59 96 %   19 1230 -- 77 18 96/61 94 %   19 1215 -- 79 15 91/54 93 %   08/05/19 1212 -- 79 16 (!) 87/52 91 %   08/05/19 1205 98.2 °F (36.8 °C) 80 17 (!) 79/38 91 %   08/05/19 1200 -- 79 21 (!) 81/54 93 %       Intake/Output Summary (Last 24 hours) at 8/6/2019 1042  Last data filed at 8/6/2019 0842  Gross per 24 hour   Intake 2267.5 ml   Output 2350 ml   Net -82.5 ml        PHYSICAL EXAM:  General: WD, WN. Alert, cooperative, no acute distress    EENT:  EOMI. Anicteric sclerae. MMM  Resp:  CTA bilaterally, no wheezing or rales. No accessory muscle use  CV:  Regular  rhythm,  No edema  GI:  Soft, Non distended, Non tender.  +Bowel sounds  Neurologic:  Alert and oriented X 3, normal speech,   Psych:   Good insight. Not anxious nor agitated  Skin:  No rashes. No jaundice    Reviewed most current lab test results and cultures  YES  Reviewed most current radiology test results   YES  Review and summation of old records today    NO  Reviewed patient's current orders and MAR    YES  PMH/SH reviewed - no change compared to H&P  ________________________________________________________________________  Care Plan discussed with:    Comments   Patient     Family      RN     Care Manager     Consultant                        Multidiciplinary team rounds were held today with , nursing, pharmacist and clinical coordinator. Patient's plan of care was discussed; medications were reviewed and discharge planning was addressed. ________________________________________________________________________  Total NON critical care TIME:  20   Minutes    Total CRITICAL CARE TIME Spent:   Minutes non procedure based      Comments   >50% of visit spent in counseling and coordination of care     ________________________________________________________________________  Hang Situ, DO     Procedures: see electronic medical records for all procedures/Xrays and details which were not copied into this note but were reviewed prior to creation of Plan.       LABS:  I reviewed today's most current labs and imaging studies. Pertinent labs include:  No results for input(s): WBC, HGB, HCT, PLT, HGBEXT, HCTEXT, PLTEXT in the last 72 hours. No results for input(s): NA, K, CL, CO2, GLU, BUN, CREA, CA, MG, PHOS, ALB, TBIL, TBILI, SGOT, ALT, INR in the last 72 hours.     No lab exists for component: INREXT    Signed: Yane Munoz,

## 2019-08-07 ENCOUNTER — APPOINTMENT (OUTPATIENT)
Dept: CT IMAGING | Age: 69
DRG: 803 | End: 2019-08-07
Attending: PSYCHIATRY & NEUROLOGY
Payer: MEDICARE

## 2019-08-07 PROBLEM — Z86.718 HISTORY OF CEREBRAL THROMBOSIS: Status: ACTIVE | Noted: 2019-08-07

## 2019-08-07 PROBLEM — R41.82 ALTERED MENTAL STATUS, UNSPECIFIED: Status: ACTIVE | Noted: 2019-08-07

## 2019-08-07 PROBLEM — R56.9 CONVULSION (HCC): Status: ACTIVE | Noted: 2019-08-07

## 2019-08-07 PROBLEM — I65.23 BILATERAL CAROTID ARTERY STENOSIS: Status: ACTIVE | Noted: 2019-08-07

## 2019-08-07 PROBLEM — R41.3 DISTURBANCE OF MEMORY: Status: ACTIVE | Noted: 2019-08-07

## 2019-08-07 LAB
25(OH)D3 SERPL-MCNC: 14.4 NG/ML (ref 30–100)
AMMONIA PLAS-SCNC: 20 UMOL/L
ANION GAP SERPL CALC-SCNC: 6 MMOL/L (ref 5–15)
BUN SERPL-MCNC: 17 MG/DL (ref 6–20)
BUN/CREAT SERPL: 16 (ref 12–20)
CALCIUM SERPL-MCNC: 8.4 MG/DL (ref 8.5–10.1)
CHLORIDE SERPL-SCNC: 111 MMOL/L (ref 97–108)
CO2 SERPL-SCNC: 24 MMOL/L (ref 21–32)
CREAT SERPL-MCNC: 1.09 MG/DL (ref 0.55–1.02)
ERYTHROCYTE [DISTWIDTH] IN BLOOD BY AUTOMATED COUNT: 13.9 % (ref 11.5–14.5)
ERYTHROCYTE [SEDIMENTATION RATE] IN BLOOD: 21 MM/HR (ref 0–30)
FOLATE SERPL-MCNC: 7 NG/ML (ref 5–21)
GLUCOSE SERPL-MCNC: 105 MG/DL (ref 65–100)
HCT VFR BLD AUTO: 33.4 % (ref 35–47)
HGB BLD-MCNC: 10.6 G/DL (ref 11.5–16)
MCH RBC QN AUTO: 29 PG (ref 26–34)
MCHC RBC AUTO-ENTMCNC: 31.7 G/DL (ref 30–36.5)
MCV RBC AUTO: 91.5 FL (ref 80–99)
NRBC # BLD: 0 K/UL (ref 0–0.01)
NRBC BLD-RTO: 0 PER 100 WBC
PLATELET # BLD AUTO: 296 K/UL (ref 150–400)
PMV BLD AUTO: 10.3 FL (ref 8.9–12.9)
POTASSIUM SERPL-SCNC: 4.1 MMOL/L (ref 3.5–5.1)
RBC # BLD AUTO: 3.65 M/UL (ref 3.8–5.2)
SODIUM SERPL-SCNC: 141 MMOL/L (ref 136–145)
TSH SERPL DL<=0.05 MIU/L-ACNC: 6.09 UIU/ML (ref 0.36–3.74)
VIT B12 SERPL-MCNC: 306 PG/ML (ref 193–986)
WBC # BLD AUTO: 11 K/UL (ref 3.6–11)

## 2019-08-07 PROCEDURE — 85027 COMPLETE CBC AUTOMATED: CPT

## 2019-08-07 PROCEDURE — 99218 HC RM OBSERVATION: CPT

## 2019-08-07 PROCEDURE — 74011250636 HC RX REV CODE- 250/636: Performed by: NURSE PRACTITIONER

## 2019-08-07 PROCEDURE — 80048 BASIC METABOLIC PNL TOTAL CA: CPT

## 2019-08-07 PROCEDURE — 70496 CT ANGIOGRAPHY HEAD: CPT

## 2019-08-07 PROCEDURE — 82607 VITAMIN B-12: CPT

## 2019-08-07 PROCEDURE — 84443 ASSAY THYROID STIM HORMONE: CPT

## 2019-08-07 PROCEDURE — 74011250637 HC RX REV CODE- 250/637: Performed by: SURGERY

## 2019-08-07 PROCEDURE — 74011250637 HC RX REV CODE- 250/637: Performed by: NEUROMUSCULOSKELETAL MEDICINE & OMM

## 2019-08-07 PROCEDURE — 74011250636 HC RX REV CODE- 250/636: Performed by: SURGERY

## 2019-08-07 PROCEDURE — 74011636320 HC RX REV CODE- 636/320: Performed by: SURGERY

## 2019-08-07 PROCEDURE — 65270000029 HC RM PRIVATE

## 2019-08-07 PROCEDURE — 70450 CT HEAD/BRAIN W/O DYE: CPT

## 2019-08-07 PROCEDURE — 82306 VITAMIN D 25 HYDROXY: CPT

## 2019-08-07 PROCEDURE — 85652 RBC SED RATE AUTOMATED: CPT

## 2019-08-07 PROCEDURE — 82140 ASSAY OF AMMONIA: CPT

## 2019-08-07 PROCEDURE — 82746 ASSAY OF FOLIC ACID SERUM: CPT

## 2019-08-07 PROCEDURE — 86038 ANTINUCLEAR ANTIBODIES: CPT

## 2019-08-07 PROCEDURE — 94760 N-INVAS EAR/PLS OXIMETRY 1: CPT

## 2019-08-07 PROCEDURE — 36415 COLL VENOUS BLD VENIPUNCTURE: CPT

## 2019-08-07 RX ORDER — SODIUM CHLORIDE 0.9 % (FLUSH) 0.9 %
10 SYRINGE (ML) INJECTION
Status: COMPLETED | OUTPATIENT
Start: 2019-08-07 | End: 2019-08-07

## 2019-08-07 RX ADMIN — ASPIRIN 81 MG: 81 TABLET, COATED ORAL at 08:24

## 2019-08-07 RX ADMIN — GUANFACINE HYDROCHLORIDE 2 MG: 1 TABLET ORAL at 20:33

## 2019-08-07 RX ADMIN — ATORVASTATIN CALCIUM 40 MG: 40 TABLET, FILM COATED ORAL at 20:32

## 2019-08-07 RX ADMIN — IOPAMIDOL 100 ML: 755 INJECTION, SOLUTION INTRAVENOUS at 15:54

## 2019-08-07 RX ADMIN — Medication 10 ML: at 15:55

## 2019-08-07 RX ADMIN — ENOXAPARIN SODIUM 30 MG: 30 INJECTION, SOLUTION INTRAVENOUS; SUBCUTANEOUS at 05:32

## 2019-08-07 RX ADMIN — FLUOXETINE 40 MG: 20 CAPSULE ORAL at 08:24

## 2019-08-07 RX ADMIN — LABETALOL HYDROCHLORIDE 300 MG: 200 TABLET, FILM COATED ORAL at 08:24

## 2019-08-07 RX ADMIN — AMLODIPINE BESYLATE 10 MG: 5 TABLET ORAL at 08:24

## 2019-08-07 RX ADMIN — CLOPIDOGREL BISULFATE 75 MG: 75 TABLET ORAL at 08:24

## 2019-08-07 RX ADMIN — MELATONIN 1.5 MG: at 20:32

## 2019-08-07 RX ADMIN — SODIUM CHLORIDE 25 ML/HR: 900 INJECTION, SOLUTION INTRAVENOUS at 05:31

## 2019-08-07 RX ADMIN — LABETALOL HYDROCHLORIDE 300 MG: 200 TABLET, FILM COATED ORAL at 17:01

## 2019-08-07 NOTE — PROGRESS NOTES
Vascular Surgery Progress Note  Yuri Brizuela ACNP-BC  8/7/2019       Subjective:   Ms. Usman Green has a pmhx signficant for ASHD, HTN, HLD, CKD, CVA w residual right hemiparesis, carotid stenosis, BRENDAN, morbid obesity, and right femoral artery occlusion. She is s/p right common femoral, profunda, and superficial femoral endarterectomy with patch and angioplasty on 07/12/2019. Her post procedure course has been complicated by a right groin lymphatic leak. She is now admitted to the hospital s/p evacuation of lymphocele with debridement and wound vac placement on 08/05/2019. This am her pain is controlled. Her vac is intact. Her DANA drain is intact with 120cc output. BP stable overnight. Per patient's spouse she is more confused than her baseline.       Nursing Data:     Patient Vitals for the past 24 hrs:   BP Temp Pulse Resp SpO2   08/07/19 0725 153/87 97.9 °F (36.6 °C) 76 17 96 %   08/07/19 0307 110/53 98.2 °F (36.8 °C) 72 16 95 %   08/06/19 2312 100/76 98.2 °F (36.8 °C) 77 18 94 %   08/06/19 1915 132/76 98.4 °F (36.9 °C) 86 18 100 %   08/06/19 1511 136/76 98 °F (36.7 °C) 87 18 99 %   08/06/19 1105 147/78 97.3 °F (36.3 °C) 86 17 98 %     ---------------------------------------------------------------------------------------------------------    Intake/Output Summary (Last 24 hours) at 8/7/2019 0932  Last data filed at 8/7/2019 8603  Gross per 24 hour   Intake 910.42 ml   Output 2400 ml   Net -1489.58 ml       Exam:     Physical Exam   Constitutional: She is oriented to person, place, and time. She appears well-developed and well-nourished. HENT:   Head: Normocephalic. Eyes: Pupils are equal, round, and reactive to light. Neck: Normal range of motion. Cardiovascular: Normal rate and regular rhythm. Pulses:       Femoral pulses are 2+ on the left side. Right foot is warm and well perfused. Pulmonary/Chest: Effort normal. No respiratory distress. Abdominal: Soft. She exhibits no distension. Musculoskeletal:   Right hemiplegia w contracture of RUE    Neurological: She is alert and oriented to person, place, and time. Skin:        Psychiatric: Her behavior is normal. Judgment and thought content normal. Her mood appears anxious. Lab Review:     . Recent Results (from the past 24 hour(s))   METABOLIC PANEL, BASIC    Collection Time: 08/07/19  3:12 AM   Result Value Ref Range    Sodium 141 136 - 145 mmol/L    Potassium 4.1 3.5 - 5.1 mmol/L    Chloride 111 (H) 97 - 108 mmol/L    CO2 24 21 - 32 mmol/L    Anion gap 6 5 - 15 mmol/L    Glucose 105 (H) 65 - 100 mg/dL    BUN 17 6 - 20 MG/DL    Creatinine 1.09 (H) 0.55 - 1.02 MG/DL    BUN/Creatinine ratio 16 12 - 20      GFR est AA >60 >60 ml/min/1.73m2    GFR est non-AA 50 (L) >60 ml/min/1.73m2    Calcium 8.4 (L) 8.5 - 10.1 MG/DL   CBC W/O DIFF    Collection Time: 08/07/19  3:12 AM   Result Value Ref Range    WBC 11.0 3.6 - 11.0 K/uL    RBC 3.65 (L) 3.80 - 5.20 M/uL    HGB 10.6 (L) 11.5 - 16.0 g/dL    HCT 33.4 (L) 35.0 - 47.0 %    MCV 91.5 80.0 - 99.0 FL    MCH 29.0 26.0 - 34.0 PG    MCHC 31.7 30.0 - 36.5 g/dL    RDW 13.9 11.5 - 14.5 %    PLATELET 250 194 - 255 K/uL    MPV 10.3 8.9 - 12.9 FL    NRBC 0.0 0  WBC    ABSOLUTE NRBC 0.00 0.00 - 0.01 K/uL          Assessment/Plan:      Principal problem:  Lymphocele   -s/p evacuation of lymphocele with debridement and wound vac placement on 08/05/2019  PAD  -right common femoral, profunda, and superficial femoral endarterectomy with patch and angioplasty on 07/12/2019    Stable this am from a vascular standpoint. Continue wound vac and DANA drain. Encourage PT/OOB to chair/IS. Continue current pain regimen.       Active problems   Mild encephalopathy vs delirium  Hx of CVA with residual right hemiparesis    Consult neurology  ASHD  -continue ASA, Plavix, statin and BBlocker   Post procedural hypotension with hx of HTN  -hypotension resolved   -CCB and BBlocker resumed   HLD  -continue statin   CKD III  -baseline creatinine 1.44    Carotid stenosis   BRENDAN  ADHD   Anxiety   Morbid obesity   Appreciate input from hospitalist.     VTE prophylaxis:  LMWH     Disposition:   Likely home with HH and wound vac early next week.

## 2019-08-07 NOTE — PROGRESS NOTES
Hospitalist Progress Note    NAME: Salome Ortega   :  1950   MRN:  [id number]       Assessment / Plan:  Hypertension  -Continue with home meds of Norvasc and Labetalol     CAD  HLD  Depression  CVA  -cont plavix, lipitor and prozac     Insomnia - improved with melatonin     Active Problems:    Lymphocele after surgical procedure (2019)     Subjective:     Chief Complaint / Reason for Physician Visit  \"better sleep last evening. No nausea. \". Discussed with RN events overnight. Review of Systems:  Symptom Y/N Comments  Symptom Y/N Comments   Fever/Chills    Chest Pain     Poor Appetite    Edema     Cough    Abdominal Pain     Sputum    Joint Pain     SOB/WIN    Pruritis/Rash     Nausea/vomit    Tolerating PT/OT     Diarrhea    Tolerating Diet     Constipation    Other       Could NOT obtain due to:      Objective:     VITALS:   Last 24hrs VS reviewed since prior progress note. Most recent are:  Patient Vitals for the past 24 hrs:   Temp Pulse Resp BP SpO2   19 0725 97.9 °F (36.6 °C) 76 17 153/87 96 %   19 0307 98.2 °F (36.8 °C) 72 16 110/53 95 %   19 2312 98.2 °F (36.8 °C) 77 18 100/76 94 %   19 1915 98.4 °F (36.9 °C) 86 18 132/76 100 %   19 1511 98 °F (36.7 °C) 87 18 136/76 99 %   19 1105 97.3 °F (36.3 °C) 86 17 147/78 98 %       Intake/Output Summary (Last 24 hours) at 2019 0939  Last data filed at 2019 0824  Gross per 24 hour   Intake 910.42 ml   Output 2400 ml   Net -1489.58 ml        PHYSICAL EXAM:  General: WD, WN. Alert, cooperative, no acute distress    EENT:  EOMI. Anicteric sclerae. MMM  Resp:  CTA bilaterally, no wheezing or rales. No accessory muscle use  CV:  Regular  rhythm,  No edema  GI:  Soft, Non distended, Non tender.  +Bowel sounds  Neurologic:  Alert and oriented X 3, normal speech,   Psych:   Good insight. Not anxious nor agitated  Skin:  No rashes.   No jaundice    Reviewed most current lab test results and cultures YES  Reviewed most current radiology test results   YES  Review and summation of old records today    NO  Reviewed patient's current orders and MAR    YES  PMH/SH reviewed - no change compared to H&P  ________________________________________________________________________  Care Plan discussed with:    Comments   Patient     Family      RN     Care Manager     Consultant                        Multidiciplinary team rounds were held today with , nursing, pharmacist and clinical coordinator. Patient's plan of care was discussed; medications were reviewed and discharge planning was addressed. ________________________________________________________________________  Total NON critical care TIME:  20   Minutes    Total CRITICAL CARE TIME Spent:   Minutes non procedure based      Comments   >50% of visit spent in counseling and coordination of care     ________________________________________________________________________  Hang Situ, DO     Procedures: see electronic medical records for all procedures/Xrays and details which were not copied into this note but were reviewed prior to creation of Plan. LABS:  I reviewed today's most current labs and imaging studies.   Pertinent labs include:  Recent Labs     08/07/19 0312   WBC 11.0   HGB 10.6*   HCT 33.4*        Recent Labs     08/07/19 0312      K 4.1   *   CO2 24   *   BUN 17   CREA 1.09*   CA 8.4*       Signed: Hang Situ, DO

## 2019-08-07 NOTE — PROGRESS NOTES
Looks and feels well. DANA 120cc past 24 hrs. Wnd Vac 0. Need to be certain the wound is sealed. Cont same Rx.     Hep loc iv

## 2019-08-07 NOTE — PROGRESS NOTES
General Surgery End of Shift Nursing Note    Bedside shift change report given to Chhaya (oncoming nurse) by Trixie Mcdermott (offgoing nurse). Report included the following information SBAR, Kardex, Intake/Output and MAR. Shift worked:   7p to [employer] of shift:   Pt has been resting throughout the night. Pt stated melatonin helped her a lot. Labs drawn in the am. Wound vac intact but no output. DANA drain draining. pca was not used. Pt had a large BM at start of shift.     Issues for physician to address: none         Nito Delgado RN

## 2019-08-07 NOTE — CONSULTS
Consult  REFERRED BY:  Sara Lopez MD    CHIEF COMPLAINT: Altered mental status and memory loss      Subjective:     Yuni Louis is a 71 y.o. right-handed  female we are asked to evaluate for confusion and memory loss at the request of NP Mrs. Sheila Cagle as a new patient to us, for this new problem that has been progressive for some time. The  gives a history of patient had memory loss for 6 to 12 months, and gets confused remembering things. She forgets where she places things, and forgets with the  tells her some. It is may be gotten a little bit worse over the last several months. She was confused and could not tell her  today but the doctor told her. She has a wound where she had a femoral artery declotted, and has a wound care device and the drains are not sure to get an MRI scan. The  apparently says she has had an old stroke and has an occluded carotid on one side and a stent in the other side. Normally able to walk without assistive devices though she does have a prominent right-sided weakness. She had no other toxin exposure, no other head trauma, no meningismus, no headache, and no other precipitating event for this episode. She apparently does not have any family history of memory loss.  wants to make sure that her circulation is checked because he thinks she is not getting enough blood to her brain. Patient had a CT scan that just shows the old infarct in the left basal ganglion area and a CTA shows patent right internal carotid artery, occluded left internal carotid artery at the origin, and vertebral arteries seem patent, and the middle cerebral arteries fed from the posterior cerebral artery on the left in the left anterior cerebral artery is fed by the anterior communicator from the right. She does not appear to have any new flow problems.   Complete metabolic panel done to also rule out other treatable causes of her memory loss like B12 and thyroid. 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 She will probably need neuropsych testing as an outpatient to establish a diagnosis and see if she is a candidate for cognitive enhancing agents. Patient had a previous stroke lacunar infarct in  has left her with this residual spastic right hemiparesis    Past Medical History:   Diagnosis Date    Adverse effect of anesthesia     sleep apnea does not use cpap machine      Arthritis     Spine, DDD    CAD (coronary artery disease) 8 yr ago    Carotid stent on right, Left is 100% Occluded, sees Dr. Preeti Ryder, PVD    Depression     High cholesterol     Hypertension     Psychiatric disorder     PVD (peripheral vascular disease) with claudication ([address]     Sleep apnea     Dx and treated with surgery without improvement    Stroke ([address] 2004    right leg weakness, right arm paralysis (treated in [place])      Past Surgical History:   Procedure Laterality Date    HX GYN      BTL    HX HEENT      Tonsilectomy    VASCULAR SURGERY PROCEDURE UNLIST Right     Carotid stent, Dr. Preeti Ryder, left Carotid 100% blocked     Family History   Problem Relation Age of Onset    Heart Disease Mother     Other Mother         PAD    COPD Mother     Cancer Maternal Grandmother     COPD Father     Other Father          GSW   N[place] Cruz Other Sister         PAD    COPD Sister     No Known Problems Child       Social History     Tobacco Use    Smoking status: Former Smoker     Packs/day: 2.00     Years: 35.00     Pack years: 70.00     Last attempt to quit: 2003     Years since quittin.0    Smokeless tobacco: Never Used   Substance Use Topics    Alcohol use:  Yes     Alcohol/week: 2.0 standard drinks     Types: 2 Cans of beer per week         Current Facility-Administered Medications:     melatonin tablet 1.5 mg, 1.5 mg, Oral, QHS PRN, Beto Perez DO, 1.5 mg at 19    amLODIPine (NORVASC) tablet 10 mg, 10 mg, Oral, DAILY, Juan Pooja Pena MD, 10 mg at 08/07/19 1560    aspirin delayed-release tablet 81 mg, 81 mg, Oral, DAILY, Pooja Martinez MD, 81 mg at 08/07/19 7025    atorvastatin (LIPITOR) tablet 40 mg, 40 mg, Oral, QHS, Pooja Martinez MD, 40 mg at 08/06/19 2118    clopidogrel (PLAVIX) tablet 75 mg, 75 mg, Oral, DAILY, Pooja Martinez MD, 75 mg at 08/07/19 1516    FLUoxetine (PROzac) capsule 40 mg, 40 mg, Oral, DAILY, Pooja Martinez MD, 40 mg at 08/07/19 8407    guanFACINE IR (TENEX) tablet 2 mg, 2 mg, Oral, QHS, Pooja Martinez MD, 2 mg at 08/06/19 2121    HYDROcodone-acetaminophen (NORCO)  mg tablet 1 Tab, 1 Tab, Oral, Q4H PRN, Pooja Martinez MD    labetalol (NORMODYNE) tablet 300 mg, 300 mg, Oral, BID, Pooja Martinez MD, 300 mg at 08/07/19 1701    acetaminophen (TYLENOL) tablet 325 mg, 325 mg, Oral, Q4H PRN, Pooja Martinez MD    bisacodyl (DULCOLAX) tablet 5 mg, 5 mg, Oral, DAILY PRN, Pooja Martinez MD    enoxaparin (LOVENOX) injection 30 mg, 30 mg, SubCUTAneous, Q24H, Pooja Martinez MD, 30 mg at 08/07/19 0532    ondansetron (ZOFRAN) injection 4 mg, 4 mg, IntraVENous, Q6H PRN, Pooja Martinez MD    hydrALAZINE (APRESOLINE) 20 mg/mL injection 10 mg, 10 mg, IntraVENous, Q6H PRN, Vanessa Vickers MD        Allergies   Allergen Reactions    Tylenol [Acetaminophen] Unknown (comments)     States cannot take this has problems cannot remember reaction       Codeine Nausea Only      MRI Results (most recent):  No results found for this or any previous visit. No results found for this or any previous visit.   Review of Systems:  A comprehensive review of systems was negative except for: Constitutional: positive for fatigue and malaise  Ears, nose, mouth, throat, and face: positive for hearing loss  Musculoskeletal: positive for myalgias, arthralgias, stiff joints and muscle weakness  Neurological: positive for memory problems, coordination problems, gait problems and weakness  Behvioral/Psych: positive for anxiety and depression   Vitals:    08/07/19 0725 08/07/19 1149 08/07/19 1625 08/07/19 1916   BP: 153/87 146/84 101/71 112/72   Pulse: 76 78 87 86   Resp: 17 18 18 18   Temp: 97.9 °F (36.6 °C) 98 °F (36.7 °C) 98.3 °F (36.8 °C) 98.4 °F (36.9 °C)   SpO2: 96% 98% 98% 99%   Weight:       Height:         Objective:     I      NEUROLOGICAL EXAM:    Appearance: The patient is well developed, well nourished, provides a poor history and is in no acute distress. Patient has a wound care device and drain in her left femoral region. Mental Status: Oriented to place and person, and the president, but does not know the date, and a little slow in her answers, and cognitive function is mildly abnormal and speech is fluent and no aphasia or dysarthria. Mood and affect appropriate but anxious. Cranial Nerves:   Intact visual fields. Fundi are benign, disc are flat, no lesions seen on funduscopy. DAVID, EOM's full, no nystagmus, no ptosis. Facial sensation is normal. Corneal reflexes are not tested. Facial movement is symmetric. Hearing is abnormal bilaterally. Palate is midline with normal sternocleidomastoid and trapezius muscles are normal. Tongue is midline. Neck without meningismus or bruits  Temporal arteries are not tender or enlarged  TMJ areas are not tender on palpation   Motor:  5/5 strength in upper and lower proximal and distal muscles on the left, but patient has a spastic right hemiplegia with contractures in the right upper extremity, and mild spastic right lower extremity but no contracture. . Normal bulk and tone. No fasciculations. Rapid alternating movement is symmetric and intact bilaterally   Reflexes:   Deep tendon reflexes 2+/4 and symmetrical.  No babinski or clonus present   Sensory:   Normal to touch, pinprick and vibration and temperature mildly abnormal.  DSS is intact   Gait:  Not tested. Tremor:   No tremor noted. Cerebellar:  Not tested Romberg and tandem and finger-nose-finger exam is unremarkable. Neurovascular:  Normal heart sounds and regular rhythm, peripheral pulses decreased, and no carotid bruits. Assessment:   Active Problems:    Stenosis of right carotid artery (11/7/2018)      Lymphocele after surgical procedure (8/5/2019)      Altered mental status, unspecified (8/7/2019)      Bilateral carotid artery stenosis (8/7/2019)      History of cerebral thrombosis (8/7/2019)      Disturbance of memory (8/7/2019)      Convulsion ([address] (8/7/2019)        Plan:     Patient most likely has a senile dementia of the Alzheimer's type, possibly aggravated some by her recent hospitalization medications. We checked the CTA and CT as above, and they look stable. We will await the metabolic studies, and patient will need neuropsych testing as an outpatient to establish whether she has dementia, mild cognitive impairment, and needs cognitive enhancing agents. Once her wound is healed she may be a candidate for an MRI scan. We will follow carefully with you, patient examined in detail, and family and  and patient all agreed to testing as ordered. Patient high risk for further neurologic deterioration if indeed she has Alzheimer's disease.     Signed By: Roman Dickson MD     August 7, 2019       CC: Mary Moulton MD  FAX: [phone]

## 2019-08-07 NOTE — PROGRESS NOTES
VSS. Pt walking in halls today with staff. Pt and  able to demonstrate DANA drain care. [address] in place. Good appetite. Cont to monitor.

## 2019-08-07 NOTE — PHYSICIAN ADVISORY
Letter of Status Determination:   Recommend hospitalization status upgraded from   OBSERVATION  to INPATIENT  Status     Pt Name:  Miguel A Dean   MR#   [facility] # [id number] /  [id number]  Payor: Mannie Alonzo / Plan: [address] / Product Type: Medicare /    KETAN#  [id number]   Room and Hospital  2113/01  @ [facility]   Hospitalization date  8/5/2019  8:06 AM   Current Attending Physician  Main Guzman MD   Principal diagnosis  Lymphocele after surgical procedure [I89.8]     Clinicals  71 y.o. y.o  female hospitalized with above diagnosis   This pt suffers from complex chronic illnesses  It is noted that the pt has developed lymphcele after peripheral artery procedure last month. The pt went through debridement and evacuation of the lymphocele and now remains in acute care setting for satisfactory recovery and confirmation of closure of the drain. Due to appropriate and necessary medical care, this pt's hospitalization has now exceeded two midnights . [place] [place]   Does  NOT apply    STATUS DETERMINATION  This patient is at above high risk of deterioration based on documented presenting clinical data, comorbid conditions, high risk of adverse events and current acute care course. Ms. Miguel A Dean now meets Inpatient Admission status criteria in accordance with CMS regulation Section 43 .3. Specifically, due to medical necessity the patient's stay now exceeds Two Midnights. It is our recommendation that this patient's hospitalization status should be upgraded from  OBSERVATION to INPATIENT status.      The final decision of the patient's hospitalization status depends on the attending physician's judgment              Additional comments     Payor: Mannie Alonzo / Plan: [address] / Product Type: Medicare /         Milton Shannon MD MPH FAC   Cell: [phone]  Physician [address] [place] [address] Management & Compliant Documentation Management Program  [facility]       Cell  [phone]        [phone]    .

## 2019-08-07 NOTE — PROGRESS NOTES
ANA Plan:                 *Home with [address] with wound vac                        At [facility] has accepted pt. FOC was provided, signed and placed on pt chart. Per MD note, patient is expected to d/c early next week. Per wound care RN, pt will likely go home with a prevena wound vac. This type of wound vac is provided by hospital and does not need to be ordered through [facility]. CM will continue to follow.     Kriss Villa  Ext [phone]

## 2019-08-07 NOTE — WOUND CARE
Wound Care nurse checked on the patient to determine the amount of drainage in her Hospital wound vac canister. There is no drainage in the canister but the Wound Vac is functioning well. Recommendation: Based on the amount of drainage and the fact that this is an incisional type wound, the Preveena wound Vac which is an incisional Wound vac would be more appropriate for the patient's needs. The Preveena Wound vac is a one week only wound vac to help heal incisional wounds and then the Vac is discontinued by home care and the unit is discarded in the trash after one week. The patient's wound can then be dressed with a dry dressing and followed by the surgeon.    Vasu Nolen RN, BSN, [employer]

## 2019-08-08 PROCEDURE — 74011250636 HC RX REV CODE- 250/636: Performed by: SURGERY

## 2019-08-08 PROCEDURE — 74011250637 HC RX REV CODE- 250/637: Performed by: NEUROMUSCULOSKELETAL MEDICINE & OMM

## 2019-08-08 PROCEDURE — 74011250637 HC RX REV CODE- 250/637: Performed by: SURGERY

## 2019-08-08 PROCEDURE — 65270000029 HC RM PRIVATE

## 2019-08-08 RX ORDER — LANOLIN ALCOHOL/MO/W.PET/CERES
3 CREAM (GRAM) TOPICAL
Status: DISCONTINUED | OUTPATIENT
Start: 2019-08-08 | End: 2019-08-16 | Stop reason: HOSPADM

## 2019-08-08 RX ORDER — OXYCODONE HYDROCHLORIDE 5 MG/1
2.5-5 TABLET ORAL
Status: DISCONTINUED | OUTPATIENT
Start: 2019-08-08 | End: 2019-08-16 | Stop reason: HOSPADM

## 2019-08-08 RX ADMIN — MELATONIN 3 MG: at 20:54

## 2019-08-08 RX ADMIN — LABETALOL HYDROCHLORIDE 300 MG: 200 TABLET, FILM COATED ORAL at 08:41

## 2019-08-08 RX ADMIN — ATORVASTATIN CALCIUM 40 MG: 40 TABLET, FILM COATED ORAL at 20:54

## 2019-08-08 RX ADMIN — ASPIRIN 81 MG: 81 TABLET, COATED ORAL at 08:42

## 2019-08-08 RX ADMIN — FLUOXETINE 40 MG: 20 CAPSULE ORAL at 08:42

## 2019-08-08 RX ADMIN — AMLODIPINE BESYLATE 10 MG: 5 TABLET ORAL at 08:42

## 2019-08-08 RX ADMIN — CLOPIDOGREL BISULFATE 75 MG: 75 TABLET ORAL at 08:42

## 2019-08-08 RX ADMIN — LABETALOL HYDROCHLORIDE 300 MG: 200 TABLET, FILM COATED ORAL at 17:27

## 2019-08-08 RX ADMIN — GUANFACINE HYDROCHLORIDE 2 MG: 1 TABLET ORAL at 20:54

## 2019-08-08 RX ADMIN — ENOXAPARIN SODIUM 30 MG: 30 INJECTION, SOLUTION INTRAVENOUS; SUBCUTANEOUS at 05:51

## 2019-08-08 NOTE — PROGRESS NOTES
Dr Neff Looseleonidas findings noted and appreciated.     If needed, she can have MRI while in hospital.

## 2019-08-08 NOTE — PROGRESS NOTES
Vascular Surgery Progress Note  [facility]o Alfaro [facility]  8/8/2019       Subjective:   Ms. Maria Elena Castillo has a pmhx signficant for ASHD, HTN, HLD, CKD, CVA w residual right hemiparesis, carotid stenosis, BRENDAN, morbid obesity, and right femoral artery occlusion. She is s/p right common femoral, profunda, and superficial femoral endarterectomy with patch and angioplasty on 07/12/2019. Her post procedure course has been complicated by a right groin lymphatic leak. She is now admitted to the hospital s/p evacuation of lymphocele with debridement and wound vac placement on 08/05/2019. This am her pain is controlled. Her vac is intact. Her DANA drain is intact with 80+cc output.      Nursing Data:     Patient Vitals for the past 24 hrs:   BP Temp Pulse Resp SpO2   08/08/19 0729 157/86 98.5 °F (36.9 °C) 84 16 100 %   08/08/19 0337 160/49 98.3 °F (36.8 °C) 89 16 97 %   08/07/19 2314 142/58 98.2 °F (36.8 °C) 70 16 100 %   08/07/19 1916 112/72 98.4 °F (36.9 °C) 86 18 99 %   08/07/19 1625 101/71 98.3 °F (36.8 °C) 87 18 98 %   08/07/19 1149 146/84 98 °F (36.7 °C) 78 18 98 %     ---------------------------------------------------------------------------------------------------------    Intake/Output Summary (Last 24 hours) at 8/8/2019 1049  Last data filed at 8/8/2019 0800  Gross per 24 hour   Intake 610 ml   Output 90 ml   Net 520 ml       Exam:     Physical Exam   Constitutional: She is oriented to person, place, and time. She appears well-developed and well-nourished. HENT:   Head: Normocephalic. Eyes: Pupils are equal, round, and reactive to light. Neck: Normal range of motion. Cardiovascular: Normal rate and regular rhythm. Pulses:       Femoral pulses are 2+ on the left side. Right foot is warm and well perfused. Pulmonary/Chest: Effort normal. No respiratory distress. Abdominal: Soft. She exhibits no distension.    Musculoskeletal:   Right hemiplegia w contracture of RUE    Neurological: She is alert and oriented to person, place, and time. Skin:        Psychiatric: Her behavior is normal. Judgment and thought content normal. Her mood appears anxious. Lab Review:     . Recent Results (from the past 24 hour(s))   AMMONIA    Collection Time: 08/07/19  2:31 PM   Result Value Ref Range    Ammonia 20 <32 UMOL/L   VITAMIN B12    Collection Time: 08/07/19  2:31 PM   Result Value Ref Range    Vitamin B12 306 193 - 986 pg/mL   FOLATE    Collection Time: 08/07/19  2:31 PM   Result Value Ref Range    Folate 7.0 5.0 - 21.0 ng/mL   TSH 3RD GENERATION    Collection Time: 08/07/19  2:31 PM   Result Value Ref Range    TSH 6.09 (H) 0.36 - 3.74 uIU/mL   SED RATE (ESR)    Collection Time: 08/07/19  2:31 PM   Result Value Ref Range    Sed rate, automated 21 0 - 30 mm/hr   VITAMIN D, 25 HYDROXY    Collection Time: 08/07/19  2:31 PM   Result Value Ref Range    Vitamin D 25-Hydroxy 14.4 (L) 30 - 100 ng/mL          Assessment/Plan:      Principal problem:  Lymphocele   -s/p evacuation of lymphocele with debridement and wound vac placement on 08/05/2019  PAD  -right common femoral, profunda, and superficial femoral endarterectomy with patch and angioplasty on 07/12/2019    Stable this am from a vascular standpoint. Continue wound vac and convert DANA to gravity drainage. Encourage PT/OOB to chair/IS. Decrease narcotic dosages due to mild confusion per spouse. Active problems   Mild encephalopathy vs delirium  Hx of CVA with residual right hemiparesis    Appreciate input from neurology  ASHD  -continue ASA, Plavix, statin and BBlocker   Post procedural hypotension with hx of HTN  -hypotension resolved   -CCB and BBlocker resumed   HLD  -continue statin   CKD III  -baseline creatinine 1.44  Carotid stenosis   BRENDAN  ADHD   Anxiety   Morbid obesity   Appreciate input from hospitalist.     VTE prophylaxis:  LMWH     Disposition:   Likely home with HH and possible wound vac early next week.

## 2019-08-08 NOTE — PROGRESS NOTES
General Surgery End of Shift Nursing Note    Bedside shift change report given to Chhaya MCGRAW (oncoming nurse) by Rhiannon Amin (offgoing nurse). Report included the following information SBAR, Kardex, Intake/Output, MAR and Recent Results. Shift worked:   7P-7A   Summary of shift:    No acute events. WV and DANA drain intact. Appeared to rest comfortably.    Issues for physician to address:   n/a     Number times ambulated in hallway past shift: 0    Number of times OOB to chair past shift: 2    Pain Management:  Current medication: Not needed per pt  Patient states pain is manageable on current pain medication: YES    GI:    Current diet:  DIET CARDIAC    Tolerating current diet: YES  Passing flatus: YES  Last Bowel Movement: yesterday       Respiratory:    Incentive Spirometer at bedside: YES  Patient instructed on use: [address]

## 2019-08-08 NOTE — PROGRESS NOTES
Hospitalist Progress Note    NAME: Mathieu King   :  1950   MRN:  [id number]       Assessment / Plan:  Hypertension  -Continue with home meds of Norvasc and Labetalol     CAD  HLD  Depression  CVA / dementia  -cont plavix, lipitor and prozac  -melantonin increase to 3mg as pt states it doesn't work for 3-5 hrs.     Insomnia - improved with melatonin     Active Problems:    Lymphocele after surgical procedure (2019)           Subjective:     Chief Complaint / Reason for Physician Visit  \"didn't sleep until midnight. No nausea or vomiting. \". Discussed with RN events overnight. Review of Systems:  Symptom Y/N Comments  Symptom Y/N Comments   Fever/Chills    Chest Pain     Poor Appetite    Edema     Cough    Abdominal Pain     Sputum    Joint Pain     SOB/WIN    Pruritis/Rash     Nausea/vomit    Tolerating PT/OT     Diarrhea    Tolerating Diet     Constipation    Other       Could NOT obtain due to:      Objective:     VITALS:   Last 24hrs VS reviewed since prior progress note. Most recent are:  Patient Vitals for the past 24 hrs:   Temp Pulse Resp BP SpO2   19 0729 98.5 °F (36.9 °C) 84 16 157/86 100 %   19 0337 98.3 °F (36.8 °C) 89 16 160/49 97 %   19 2314 98.2 °F (36.8 °C) 70 16 142/58 100 %   19 1916 98.4 °F (36.9 °C) 86 18 112/72 99 %   19 1625 98.3 °F (36.8 °C) 87 18 101/71 98 %   19 1149 98 °F (36.7 °C) 78 18 146/84 98 %       Intake/Output Summary (Last 24 hours) at 2019 1034  Last data filed at 2019 0800  Gross per 24 hour   Intake 610 ml   Output 90 ml   Net 520 ml        PHYSICAL EXAM:  General: WD, WN. Alert, cooperative, no acute distress    EENT:  EOMI. Anicteric sclerae. MMM  Resp:  CTA bilaterally, no wheezing or rales.   No accessory muscle use  CV:  Regular  rhythm,  No edema  GI:  Soft, Non distended, Non tender.  +Bowel sounds  Neurologic:  Alert and oriented X 3, normal speech,   Psych:   Good insight. Not anxious nor agitated  Skin:  No rashes. No jaundice    Reviewed most current lab test results and cultures  YES  Reviewed most current radiology test results   YES  Review and summation of old records today    NO  Reviewed patient's current orders and MAR    YES  PMH/SH reviewed - no change compared to H&P  ________________________________________________________________________  Care Plan discussed with:    Comments   Patient     Family      RN     Care Manager     Consultant                        Multidiciplinary team rounds were held today with , nursing, pharmacist and clinical coordinator. Patient's plan of care was discussed; medications were reviewed and discharge planning was addressed. ________________________________________________________________________  Total NON critical care TIME:  20   Minutes    Total CRITICAL CARE TIME Spent:   Minutes non procedure based      Comments   >50% of visit spent in counseling and coordination of care     ________________________________________________________________________  Gavin Contreras DO     Procedures: see electronic medical records for all procedures/Xrays and details which were not copied into this note but were reviewed prior to creation of Plan. LABS:  I reviewed today's most current labs and imaging studies.   Pertinent labs include:  Recent Labs     08/07/19 0312   WBC 11.0   HGB 10.6*   HCT 33.4*        Recent Labs     08/07/19 0312      K 4.1   *   CO2 24   *   BUN 17   CREA 1.09*   CA 8.4*       Signed: Gavin Contreras DO

## 2019-08-09 LAB
ANA SER QL: NEGATIVE
ANION GAP SERPL CALC-SCNC: 7 MMOL/L (ref 5–15)
BUN SERPL-MCNC: 15 MG/DL (ref 6–20)
BUN/CREAT SERPL: 13 (ref 12–20)
CALCIUM SERPL-MCNC: 8.7 MG/DL (ref 8.5–10.1)
CHLORIDE SERPL-SCNC: 109 MMOL/L (ref 97–108)
CO2 SERPL-SCNC: 26 MMOL/L (ref 21–32)
CREAT SERPL-MCNC: 1.14 MG/DL (ref 0.55–1.02)
ERYTHROCYTE [DISTWIDTH] IN BLOOD BY AUTOMATED COUNT: 13.6 % (ref 11.5–14.5)
GLUCOSE SERPL-MCNC: 102 MG/DL (ref 65–100)
HCT VFR BLD AUTO: 36.3 % (ref 35–47)
HGB BLD-MCNC: 11.7 G/DL (ref 11.5–16)
MCH RBC QN AUTO: 29 PG (ref 26–34)
MCHC RBC AUTO-ENTMCNC: 32.2 G/DL (ref 30–36.5)
MCV RBC AUTO: 89.9 FL (ref 80–99)
NRBC # BLD: 0 K/UL (ref 0–0.01)
NRBC BLD-RTO: 0 PER 100 WBC
PLATELET # BLD AUTO: 297 K/UL (ref 150–400)
PMV BLD AUTO: 10.6 FL (ref 8.9–12.9)
POTASSIUM SERPL-SCNC: 3.9 MMOL/L (ref 3.5–5.1)
RBC # BLD AUTO: 4.04 M/UL (ref 3.8–5.2)
SODIUM SERPL-SCNC: 142 MMOL/L (ref 136–145)
WBC # BLD AUTO: 8.4 K/UL (ref 3.6–11)

## 2019-08-09 PROCEDURE — 36415 COLL VENOUS BLD VENIPUNCTURE: CPT

## 2019-08-09 PROCEDURE — 74011250637 HC RX REV CODE- 250/637: Performed by: SURGERY

## 2019-08-09 PROCEDURE — 74011250636 HC RX REV CODE- 250/636: Performed by: SURGERY

## 2019-08-09 PROCEDURE — 85027 COMPLETE CBC AUTOMATED: CPT

## 2019-08-09 PROCEDURE — 77030019934 HC DRSG VAC ASST KCON -B

## 2019-08-09 PROCEDURE — 77030010520

## 2019-08-09 PROCEDURE — 65270000029 HC RM PRIVATE

## 2019-08-09 PROCEDURE — 80048 BASIC METABOLIC PNL TOTAL CA: CPT

## 2019-08-09 PROCEDURE — 74011250637 HC RX REV CODE- 250/637: Performed by: NEUROMUSCULOSKELETAL MEDICINE & OMM

## 2019-08-09 RX ORDER — HYDROCODONE BITARTRATE AND ACETAMINOPHEN 5; 325 MG/1; MG/1
1 TABLET ORAL
COMMUNITY
End: 2019-08-16

## 2019-08-09 RX ADMIN — GUANFACINE HYDROCHLORIDE 2 MG: 1 TABLET ORAL at 20:55

## 2019-08-09 RX ADMIN — LABETALOL HYDROCHLORIDE 300 MG: 200 TABLET, FILM COATED ORAL at 10:12

## 2019-08-09 RX ADMIN — ASPIRIN 81 MG: 81 TABLET, COATED ORAL at 10:12

## 2019-08-09 RX ADMIN — ENOXAPARIN SODIUM 30 MG: 30 INJECTION, SOLUTION INTRAVENOUS; SUBCUTANEOUS at 05:52

## 2019-08-09 RX ADMIN — FLUOXETINE 40 MG: 20 CAPSULE ORAL at 10:12

## 2019-08-09 RX ADMIN — MELATONIN 3 MG: at 20:55

## 2019-08-09 RX ADMIN — AMLODIPINE BESYLATE 10 MG: 5 TABLET ORAL at 10:12

## 2019-08-09 RX ADMIN — LABETALOL HYDROCHLORIDE 300 MG: 200 TABLET, FILM COATED ORAL at 18:25

## 2019-08-09 RX ADMIN — CLOPIDOGREL BISULFATE 75 MG: 75 TABLET ORAL at 10:12

## 2019-08-09 RX ADMIN — ATORVASTATIN CALCIUM 40 MG: 40 TABLET, FILM COATED ORAL at 20:55

## 2019-08-09 NOTE — PROGRESS NOTES
Vascular Surgery Progress Note  Temi Rolon ACNP-BC  8/9/2019       Subjective:   Ms. Ambrosio Meneses has a pmhx signficant for ASHD, HTN, HLD, CKD, CVA w residual right hemiparesis, carotid stenosis, BRENDAN, morbid obesity, and right femoral artery occlusion. She is s/p right common femoral, profunda, and superficial femoral endarterectomy with patch and angioplasty on 07/12/2019. Her post procedure course has been complicated by a right groin lymphatic leak. She is now admitted to the hospital s/p evacuation of lymphocele with debridement and wound vac placement on 08/05/2019. Patient OOB to chair this am. Pain is controlled. Dressing applied overnight for minimal drainage around DANA site. Output 35cc. Wound vac no longer working w/o seal this am.      Nursing Data:     Patient Vitals for the past 24 hrs:   BP Temp Pulse Resp SpO2   08/09/19 0723 114/44 98.9 °F (37.2 °C) 71 16 98 %   08/09/19 0300 101/42 98.2 °F (36.8 °C) 73 16 96 %   08/08/19 2329 93/72 98.7 °F (37.1 °C) 70 16 95 %   08/08/19 2034 106/43 98.2 °F (36.8 °C) 73 16 98 %   08/08/19 1531 119/58 98.4 °F (36.9 °C) 74 16 98 %   08/08/19 1141 93/50 98.1 °F (36.7 °C) 72 16 97 %     ---------------------------------------------------------------------------------------------------------    Intake/Output Summary (Last 24 hours) at 8/9/2019 0900  Last data filed at 8/9/2019 0552  Gross per 24 hour   Intake 240 ml   Output 30 ml   Net 210 ml       Exam:     Physical Exam   Constitutional: She is oriented to person, place, and time. She appears well-developed and well-nourished. HENT:   Head: Normocephalic. Eyes: Pupils are equal, round, and reactive to light. Neck: Normal range of motion. Cardiovascular: Normal rate and regular rhythm. Pulses:       Femoral pulses are 2+ on the left side. Right foot is warm and well perfused. Pulmonary/Chest: Effort normal. No respiratory distress. Abdominal: Soft. She exhibits no distension. Musculoskeletal:   Right hemiplegia w contracture of RUE    Neurological: She is alert and oriented to person, place, and time. Skin:        Psychiatric: Her behavior is normal. Judgment and thought content normal. Her mood appears anxious. Lab Review:     . No results found for this or any previous visit (from the past 24 hour(s)). Assessment/Plan:      Principal problem:  Lymphocele   -s/p evacuation of lymphocele with debridement and wound vac placement on 08/05/2019  PAD  -right common femoral, profunda, and superficial femoral endarterectomy with patch and angioplasty on 07/12/2019    Wound vac change this am and plan to return DANA to suction. Continue to encourage PT/OOB to chair/IS. Continue current pain regimen. Active problems   Mild encephalopathy vs delirium vs early vascular dementia  Hx of CVA with residual right hemiparesis    -appreciate input from neurology  Outpatient follow up with Neurology. ASHD  -continue ASA, Plavix, statin and BBlocker   Post procedural hypotension with hx of HTN  -hypotension resolved   -CCB and BBlocker resumed   HLD  -continue statin   CKD III  -baseline creatinine 1.44  Carotid stenosis   BRENDAN  ADHD   Anxiety   Morbid obesity   Appreciate input from hospitalist.     VTE prophylaxis:  LMWH     Disposition:   Likely home with HH and possible wound vac early next week.

## 2019-08-09 NOTE — PROGRESS NOTES
Hospitalist Progress Note    NAME: Sierra Hines   :  1950   MRN:  [id number]       Assessment / Plan:  Hypertension  -bp fluctuates a lot but overall stable. Cont norvasc and labetalol     CAD  HLD  Depression  CVA / dementia  -cont plavix, lipitor and prozac  -cont melatonin 3mg qhs prn.     Insomnia - improved with melatonin     Active Problems:    Lymphocele after surgical procedure (2019)     Subjective:     Chief Complaint / Reason for Physician Visit  \"no reports ofpain or chills. \". Discussed with RN events overnight. Review of Systems:  Symptom Y/N Comments  Symptom Y/N Comments   Fever/Chills    Chest Pain     Poor Appetite    Edema     Cough    Abdominal Pain     Sputum    Joint Pain     SOB/WIN    Pruritis/Rash     Nausea/vomit    Tolerating PT/OT     Diarrhea    Tolerating Diet     Constipation    Other       Could NOT obtain due to:      Objective:     VITALS:   Last 24hrs VS reviewed since prior progress note. Most recent are:  Patient Vitals for the past 24 hrs:   Temp Pulse Resp BP SpO2   19 1122 98.1 °F (36.7 °C) 65 16 150/46 98 %   19 0723 98.9 °F (37.2 °C) 71 16 114/44 98 %   19 0300 98.2 °F (36.8 °C) 73 16 101/42 96 %   19 2329 98.7 °F (37.1 °C) 70 16 93/72 95 %   19 2034 98.2 °F (36.8 °C) 73 16 106/43 98 %   19 1531 98.4 °F (36.9 °C) 74 16 119/58 98 %       Intake/Output Summary (Last 24 hours) at 2019 1252  Last data filed at 2019 1022  Gross per 24 hour   Intake 480 ml   Output 30 ml   Net 450 ml        PHYSICAL EXAM:  General: WD, WN. Alert, cooperative, no acute distress    EENT:  EOMI. Anicteric sclerae. MMM  Resp:  CTA bilaterally, no wheezing or rales. No accessory muscle use  CV:  Regular  rhythm,  No edema  GI:  Soft, Non distended, Non tender.  +Bowel sounds  Neurologic:  Alert and oriented X 3, normal speech,   Psych:   Good insight. Not anxious nor agitated  Skin:  No rashes.   No jaundice    Reviewed most current lab test results and cultures  YES  Reviewed most current radiology test results   YES  Review and summation of old records today    NO  Reviewed patient's current orders and MAR    YES  PMH/SH reviewed - no change compared to H&P  ________________________________________________________________________  Care Plan discussed with:    Comments   Patient     Family      RN     Care Manager     Consultant                        Multidiciplinary team rounds were held today with , nursing, pharmacist and clinical coordinator. Patient's plan of care was discussed; medications were reviewed and discharge planning was addressed. ________________________________________________________________________  Total NON critical care TIME:  20   Minutes    Total CRITICAL CARE TIME Spent:   Minutes non procedure based      Comments   >50% of visit spent in counseling and coordination of care     ________________________________________________________________________  Monica Nurse,      Procedures: see electronic medical records for all procedures/Xrays and details which were not copied into this note but were reviewed prior to creation of Plan. LABS:  I reviewed today's most current labs and imaging studies.   Pertinent labs include:  Recent Labs     08/09/19  1108 08/07/19  0312   WBC 8.4 11.0   HGB 11.7 10.6*   HCT 36.3 33.4*    296     Recent Labs     08/09/19  1108 08/07/19  0312    141   K 3.9 4.1   * 111*   CO2 26 24   * 105*   BUN 15 17   CREA 1.14* 1.09*   CA 8.7 8.4*       Signed: Monica Blank DO

## 2019-08-09 NOTE — WOUND CARE
Wound Vac dressing change: NP for vascular consulted  nurses today stating \" Wound vac change today.  Please complete [employer] form left on paper chart\". Patient is a 72 y/o CF POD# 4 from Evacuation of lymphocele with debridement. MD placed traditional NPWT dressing over suture closed incision in OR. Past Medical History:   Diagnosis Date    Adverse effect of anesthesia     sleep apnea does not use cpap machine      Arthritis     Spine, DDD    CAD (coronary artery disease) 8 yr ago    Carotid stent on right, Left is 100% Occluded, sees Dr. Janice Bangura, PVD    Depression     High cholesterol     Hypertension     Psychiatric disorder     PVD (peripheral vascular disease) with claudication ([address]     Sleep apnea     Dx and treated with surgery without improvement    Stroke ([facility] [address] 06/17/2004    right leg weakness, right arm paralysis (treated in [place])     Past Surgical History:   Procedure Laterality Date    HX GYN  1971    BTL    HX HEENT      Tonsilectomy    VASCULAR SURGERY PROCEDURE UNLIST Right     Carotid stent, Dr. Janice Bangura, left Carotid 100% blocked     WC nurse introduced self and services to patient. Old NPWT dressing removed and closed incision with sutures cleaned and measurements taken.       Vacuum Assisted Closure Right Groin (Active)   Vac Status Suction, continuous 8/9/2019  7:57 AM   Suction (mmHg) 125 8/9/2019  7:57 AM   Site Assessment Clean, dry, & intact 8/9/2019  7:57 AM   Dressing Status Clean, dry, & intact 8/9/2019  7:57 AM   Intake (ml) 0 ml 8/9/2019  7:57 AM   Drainage Chamber Level (ml) 0 ml 8/9/2019  7:57 AM   [place] Changed No 8/9/2019  7:57 AM   Output (ml) 0 ml 8/9/2019  7:57 AM   Number of days: 4       Wound Groin Right (Active)   Number of days: 28       Wound Groin Right Closed with wound vac (Active)   Dressing Status Removed 8/9/2019  4:04 PM   Dressing Type Vacuum dressing 8/9/2019  4:04 PM   Incision Site Well Approximated Yes 8/9/2019  4:04 PM   Wound Length (cm) 8 cm 8/9/2019  4:04 PM   Wound Width (cm) 0.2 cm 8/9/2019  4:04 PM   Condition of Base Other (comment) 8/9/2019  4:04 PM   Drainage Amount Scant 8/9/2019  4:04 PM   Drainage Color Serosanguinous 8/9/2019  4:04 PM   Wound Odor None 8/9/2019  4:04 PM   Omlly-wound Assessment Blanchable erythema; Induration 8/9/2019  4:04 PM   Cleansing and Cleansing Agents  Dermal wound cleanser 8/9/2019  4:04 PM   Dressing Changed Changed/New 8/9/2019  4:04 PM   Dressing Type Applied Vacuum dressing 8/9/2019  4:04 PM   Number of days: 4      Wound VAC dressing applied:    1. Small black foam kit needed  2. Molly-wound skin protected with skin prep and drape. 3. Bridge made onto thigh to keep from placing TRAC pad directly into groin which was uncomfortable for patient. 4. -125 mm/hg, continuous as previously set. 5. For closed incisions dressing changes are q5-7 days    Home Wound VAC equipment rental form filled out and CM notified. Plan: Vascular team can consult us for next dressing change, home wound VAC application or if they want a disposable Prevena NPWT applied before discharge.     Keiry Amaro RN, [employer]

## 2019-08-09 NOTE — PROGRESS NOTES
ANA Plan:                 *Home with [place]              *Home with wound vac    Wound care informed CM that [employer] form is complete.   Form faxed to [facility]

## 2019-08-09 NOTE — PROGRESS NOTES
Consult  REFERRED BY:  Lee Carpenter MD    CHIEF COMPLAINT: Altered mental status and memory loss      Subjective:     Bhavani Bowie is a 71 y.o. right-handed  female we are asked to evaluate for confusion and memory loss at the request of NP Mrs. Domingo Robb as a new patient to us, for this new problem that has been progressive for some time. The  gives a history of patient had memory loss for 6 to 12 months, and gets confused remembering things. She forgets where she places things, and forgets with the  tells her some. It is may be gotten a little bit worse over the last several months. She was confused and could not tell her  today but the doctor told her. She has a wound where she had a femoral artery declotted, and has a wound care device and the drains are not sure to get an MRI scan. The  apparently says she has had an old stroke and has an occluded carotid on one side and a stent in the other side. Normally able to walk without assistive devices though she does have a prominent right-sided weakness. She had no other toxin exposure, no other head trauma, no meningismus, no headache, and no other precipitating event for this episode. She apparently does not have any family history of memory loss.  wants to make sure that her circulation is checked because he thinks she is not getting enough blood to her brain. Patient had a CT scan that just shows the old infarct in the left basal ganglion area and a CTA shows patent right internal carotid artery, occluded left internal carotid artery at the origin, and vertebral arteries seem patent, and the middle cerebral arteries fed from the posterior cerebral artery on the left in the left anterior cerebral artery is fed by the anterior communicator from the right. She does not appear to have any new flow problems. Complete metabolic panel done and her B12, thyroid, and folic acid are all unremarkable.   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 She will probably need neuropsych testing as an outpatient to establish a diagnosis and see if she is a candidate for cognitive enhancing agents. Patient had a previous stroke lacunar infarct in  has left her with this residual spastic right hemiparesis  And was told these findings with the patient and  today and they agree with plans and therapy and are relieved to know that she has no more new vascular disease. Past Medical History:   Diagnosis Date    Adverse effect of anesthesia     sleep apnea does not use cpap machine      Arthritis     Spine, DDD    CAD (coronary artery disease) 8 yr ago    Carotid stent on right, Left is 100% Occluded, sees Dr. Alcon Awad, PVD    Depression     High cholesterol     Hypertension     Psychiatric disorder     PVD (peripheral vascular disease) with claudication ([facility] [address]     Sleep apnea     Dx and treated with surgery without improvement    Stroke ([facility] [address] 2004    right leg weakness, right arm paralysis (treated in [place])      Past Surgical History:   Procedure Laterality Date    HX GYN      BTL    HX HEENT      Tonsilectomy    VASCULAR SURGERY PROCEDURE UNLIST Right     Carotid stent, Dr. Alcon Awad, left Carotid 100% blocked     Family History   Problem Relation Age of Onset    Heart Disease Mother     Other Mother         PAD    COPD Mother     Cancer Maternal Grandmother     COPD Father     Other Father          GSW   Gianna Duncan Other Sister         PAD    COPD Sister     No Known Problems Child       Social History     Tobacco Use    Smoking status: Former Smoker     Packs/day: 2.00     Years: 35.00     Pack years: 70.00     Last attempt to quit: 2003     Years since quittin.0    Smokeless tobacco: Never Used   Substance Use Topics    Alcohol use:  Yes     Alcohol/week: 2.0 standard drinks     Types: 2 Cans of beer per week         Current Facility-Administered Medications:     oxyCODONE IR (ROXICODONE) tablet 2.5-5 mg, 2.5-5 mg, Oral, Q4H PRN, Radha Fernandes, NP    melatonin tablet 3 mg, 3 mg, Oral, QHS PRN, Beto Perez DO, 3 mg at 08/08/19 2054    amLODIPine (NORVASC) tablet 10 mg, 10 mg, Oral, DAILY, Cassidy Martinez MD, 10 mg at 08/08/19 0922    aspirin delayed-release tablet 81 mg, 81 mg, Oral, DAILY, Cassidy Martinez MD, 81 mg at 08/08/19 1255    atorvastatin (LIPITOR) tablet 40 mg, 40 mg, Oral, QHS, Cassidy Martinez MD, 40 mg at 08/08/19 2054    clopidogrel (PLAVIX) tablet 75 mg, 75 mg, Oral, DAILY, Cassidy Martinez MD, 75 mg at 08/08/19 7850    FLUoxetine (PROzac) capsule 40 mg, 40 mg, Oral, DAILY, Cassidy Martinez MD, 40 mg at 08/08/19 8926    guanFACINE IR (TENEX) tablet 2 mg, 2 mg, Oral, QHS, Cassidy Martinez MD, 2 mg at 08/08/19 2054    labetalol (NORMODYNE) tablet 300 mg, 300 mg, Oral, BID, Cassidy Martinez MD, 300 mg at 08/08/19 1727    acetaminophen (TYLENOL) tablet 325 mg, 325 mg, Oral, Q4H PRN, Cassidy Martinez MD    bisacodyl (DULCOLAX) tablet 5 mg, 5 mg, Oral, DAILY PRN, Cassidy Martinez MD    enoxaparin (LOVENOX) injection 30 mg, 30 mg, SubCUTAneous, Q24H, Cassidy Martinez MD, 30 mg at 08/08/19 0551    ondansetron (ZOFRAN) injection 4 mg, 4 mg, IntraVENous, Q6H PRN, Cassidy Martinez MD    hydrALAZINE (APRESOLINE) 20 mg/mL injection 10 mg, 10 mg, IntraVENous, Q6H PRN, Astrid Pineda MD        Allergies   Allergen Reactions    Tylenol [Acetaminophen] Unknown (comments)     States cannot take this has problems cannot remember reaction       Codeine Nausea Only      MRI Results (most recent):  No results found for this or any previous visit. No results found for this or any previous visit.   Review of Systems:  A comprehensive review of systems was negative except for: Constitutional: positive for fatigue and malaise  Ears, nose, mouth, throat, and face: positive for hearing loss  Musculoskeletal: positive for myalgias, arthralgias, stiff joints and muscle weakness  Neurological: positive for memory problems, coordination problems, gait problems and weakness  Behvioral/Psych: positive for anxiety and depression   Vitals:    08/08/19 0729 08/08/19 1141 08/08/19 1531 08/08/19 2034   BP: 157/86 93/50 119/58 106/43   Pulse: 84 72 74 73   Resp: 16 16 16 16   Temp: 98.5 °F (36.9 °C) 98.1 °F (36.7 °C) 98.4 °F (36.9 °C) 98.2 °F (36.8 °C)   SpO2: 100% 97% 98% 98%   Weight:       Height:         Objective:     I      NEUROLOGICAL EXAM:    Appearance: The patient is well developed, well nourished, provides a poor history and is in no acute distress. Patient has a wound care device and drain in her left femoral region. Mental Status: Oriented to place and person, and the president, but does not know the date, and a little slow in her answers, and cognitive function is mildly abnormal and speech is fluent and no aphasia or dysarthria. Mood and affect appropriate but anxious. Cranial Nerves:   Intact visual fields. Fundi are benign, disc are flat, no lesions seen on funduscopy. DAVID, EOM's full, no nystagmus, no ptosis. Facial sensation is normal. Corneal reflexes are not tested. Facial movement is symmetric. Hearing is abnormal bilaterally. Palate is midline with normal sternocleidomastoid and trapezius muscles are normal. Tongue is midline. Neck without meningismus or bruits  Temporal arteries are not tender or enlarged  TMJ areas are not tender on palpation   Motor:  5/5 strength in upper and lower proximal and distal muscles on the left, but patient has a spastic right hemiplegia with contractures in the right upper extremity, and mild spastic right lower extremity but no contracture. . Normal bulk and tone. No fasciculations.   Rapid alternating movement is symmetric and intact bilaterally   Reflexes:   Deep tendon reflexes 2+/4 and symmetrical.  No babinski or clonus present   Sensory:   Normal to touch, pinprick and vibration and temperature mildly abnormal.  DSS is intact   Gait:  Not tested. Tremor:   No tremor noted. Cerebellar:  Not tested Romberg and tandem and finger-nose-finger exam is unremarkable. Neurovascular:  Normal heart sounds and regular rhythm, peripheral pulses decreased, and no carotid bruits. Assessment:   Active Problems:    Stenosis of right carotid artery (11/7/2018)      Lymphocele after surgical procedure (8/5/2019)      Altered mental status, unspecified (8/7/2019)      Bilateral carotid artery stenosis (8/7/2019)      History of cerebral thrombosis (8/7/2019)      Disturbance of memory (8/7/2019)      Convulsion ([address] (8/7/2019)        Plan:     Plan negative work-up think CTA and CT of the head to the patient and the  and they are relieved that she has no vascular cause of her symptoms and agree to come to our office to get neuropsych testing and consider cognitive enhancing agents for her memory loss. Metabolic studies C78 folic acid and thyroid all unremarkable. Patient most likely has a senile dementia of the Alzheimer's type, possibly aggravated some by her recent hospitalization medications. We checked the CTA and CT as above, and they look stable. Once her wound is healed she may be a candidate for an MRI scan. We will follow carefully with you, patient examined in detail, and family and  and patient all agreed to testing as ordered. Patient high risk for further neurologic deterioration if indeed she has Alzheimer's disease. Follow-up in our office in 2 to 4 weeks time, we will follow as needed, please call if we can help.     Signed By: Nohemi Ashton MD     August 8, 2019       CC: Genny Ross MD  FAX: [phone]

## 2019-08-09 NOTE — PROGRESS NOTES
Problem: Falls - Risk of  Goal: *Absence of Falls  Description  Document Eleazar Begum Fall Risk and appropriate interventions in the flowsheet. Outcome: Progressing Towards Goal  Note:   Fall Risk Interventions:  Mobility Interventions: Patient to call before getting OOB    Mentation Interventions: Adequate sleep, hydration, pain control    Medication Interventions: Evaluate medications/consider consulting pharmacy    Elimination Interventions: Patient to call for help with toileting needs    History of Falls Interventions: Door open when patient unattended         Problem: Pressure Injury - Risk of  Goal: *Prevention of pressure injury  Description  Document Juan Manuel Scale and appropriate interventions in the flowsheet.   Outcome: Progressing Towards Goal  Note:   Pressure Injury Interventions:  Sensory Interventions: Minimize linen layers    Moisture Interventions: Minimize layers    Activity Interventions: Increase time out of bed    Mobility Interventions: Float heels    Nutrition Interventions: Document food/fluid/supplement intake    Friction and Shear Interventions: Lift sheet

## 2019-08-09 NOTE — PROGRESS NOTES
General Surgery End of Shift Nursing Note    Bedside shift change report given to [address] (oncoming nurse) by Mei Purdy (offgoing nurse). Report included the following information SBAR, Intake/Output and MAR. Shift worked:   [id number]   Summary of shift:    Forgetful overnight and demanding at times. Kimberley Vo had air leaks during beginning of shift- reinforced with more wound vac clear transparent adhesive- no output noted. DANA to gravity. Assist to [facility]. Denies pain.     Issues for physician to address:   -           Rosey Mehta

## 2019-08-09 NOTE — PROGRESS NOTES
Pharmacy Medication Reconciliation     The patient was interviewed regarding current PTA medication list, use and drug allergies;  family present in room and obtained permission from patient to discuss drug regimen with visitor(s) present. The patient was questioned regarding use of any other inhalers, topical products, over the counter medications, herbal medications, vitamin products or ophthalmic/nasal/otic medication use. Allergy Update: Tylenol [acetaminophen] and Codeine    Sources: Patient, PCP, rx query     Recommendations/Findings: The following amendments were made to the patient's active medication list on file at [address]:   1) Additions: none    2) Deletions: none    3) Changes:   (Old regimen: norco 10/325 take one tablet by mouth /New regimen: norco 5/325 take one tablet every 6hrs prn pain)       -Clarified PTA med list with patient. PTA medication list was corrected to the following:     Prior to Admission Medications   Prescriptions Last Dose Informant Patient Reported? Taking? HYDROcodone-acetaminophen (NORCO) 5-325 mg per tablet 8/3/2019 Self Yes Yes   Sig: Take 1 Tab by mouth every six (6) hours as needed for Pain. amLODIPine (NORVASC) 10 mg tablet 8/5/2019 at 0500 Self Yes Yes   Sig: Take 10 mg by mouth daily. aspirin delayed-release 81 mg tablet 8/4/2019 at Unknown time Self Yes Yes   Sig: Take  by mouth daily. atorvastatin (LIPITOR) 40 mg tablet 8/4/2019 at 2200 Self Yes Yes   Sig: Take 40 mg by mouth nightly. calcium-cholecalciferol, d3, (CALCIUM 600 + D) 600-125 mg-unit tab 8/4/2019 at 0800 Self Yes Yes   Sig: Take  by mouth. clopidogrel (PLAVIX) 75 mg tab 8/4/2019 at Unknown time Self Yes Yes   fluoxetine (PROZAC) 40 mg capsule 8/5/2019 at 0500 Self Yes Yes   Sig: Take 40 mg by mouth daily. guanFACINE IR (TENEX) 2 mg IR tablet 8/4/2019 at 2200 Other Yes Yes   Sig: Take 2 mg by mouth nightly.    labetalol (NORMODYNE) 300 mg tablet 8/5/2019 at 0500 Self Yes Yes   Sig: Take 300 mg by mouth two (2) times a day.       Facility-Administered Medications: None        Thank you,  [place]

## 2019-08-10 PROCEDURE — 74011250637 HC RX REV CODE- 250/637: Performed by: NEUROMUSCULOSKELETAL MEDICINE & OMM

## 2019-08-10 PROCEDURE — 65270000029 HC RM PRIVATE

## 2019-08-10 PROCEDURE — 74011250636 HC RX REV CODE- 250/636: Performed by: SURGERY

## 2019-08-10 PROCEDURE — 74011250637 HC RX REV CODE- 250/637: Performed by: SURGERY

## 2019-08-10 RX ORDER — POLYETHYLENE GLYCOL 3350 17 G/17G
17 POWDER, FOR SOLUTION ORAL DAILY
Status: DISCONTINUED | OUTPATIENT
Start: 2019-08-10 | End: 2019-08-16 | Stop reason: HOSPADM

## 2019-08-10 RX ADMIN — CLOPIDOGREL BISULFATE 75 MG: 75 TABLET ORAL at 08:59

## 2019-08-10 RX ADMIN — FLUOXETINE 40 MG: 20 CAPSULE ORAL at 08:59

## 2019-08-10 RX ADMIN — ENOXAPARIN SODIUM 30 MG: 30 INJECTION, SOLUTION INTRAVENOUS; SUBCUTANEOUS at 06:04

## 2019-08-10 RX ADMIN — ATORVASTATIN CALCIUM 40 MG: 40 TABLET, FILM COATED ORAL at 21:10

## 2019-08-10 RX ADMIN — AMLODIPINE BESYLATE 10 MG: 5 TABLET ORAL at 09:00

## 2019-08-10 RX ADMIN — ASPIRIN 81 MG: 81 TABLET, COATED ORAL at 08:59

## 2019-08-10 RX ADMIN — GUANFACINE HYDROCHLORIDE 2 MG: 1 TABLET ORAL at 21:16

## 2019-08-10 RX ADMIN — LABETALOL HYDROCHLORIDE 300 MG: 200 TABLET, FILM COATED ORAL at 08:59

## 2019-08-10 RX ADMIN — POLYETHYLENE GLYCOL 3350 17 G: 17 POWDER, FOR SOLUTION ORAL at 10:46

## 2019-08-10 NOTE — PROGRESS NOTES
Problem: Falls - Risk of  Goal: *Absence of Falls  Description  Document Cha Casillas Fall Risk and appropriate interventions in the flowsheet. Outcome: Progressing Towards Goal  Note:   Fall Risk Interventions:  Mobility Interventions: Patient to call before getting OOB    Mentation Interventions: Adequate sleep, hydration, pain control, Increase mobility    Medication Interventions: Evaluate medications/consider consulting pharmacy    Elimination Interventions: Call light in reach    History of Falls Interventions: Door open when patient unattended         Problem: Pressure Injury - Risk of  Goal: *Prevention of pressure injury  Description  Document Juan Manuel Scale and appropriate interventions in the flowsheet.   Outcome: Progressing Towards Goal  Note:   Pressure Injury Interventions:  Sensory Interventions: Minimize linen layers    Moisture Interventions: Minimize layers    Activity Interventions: Increase time out of bed    Mobility Interventions: HOB 30 degrees or less    Nutrition Interventions: Document food/fluid/supplement intake    Friction and Shear Interventions: HOB 30 degrees or less

## 2019-08-10 NOTE — PROGRESS NOTES
Message sent to Dr. Meena Barth re:  Labetolol due now, BP totally different from one arm to other Left  107/59, pulse 66 and the right arm 157/76 pulse 66. Per Dr. Ivory [facility] med for now.

## 2019-08-10 NOTE — PROGRESS NOTES
Hospitalist Progress Note    NAME: Robin Greenfield   :  1950   MRN:  [id number]       Assessment / Plan:  Hypertension  -bpCAD  HLD  Depression  CVA / dementia  -cont plavix, lipitor and prozac  -cont melatonin 3mg qhs prn.     Insomnia - improved with melatonin     Active Problems:    Lymphocele after surgical procedure (2019)     Subjective:     Chief Complaint / Reason for Physician Visit  \"no reropts of apin or chills. \". Discussed with RN events overnight. Review of Systems:  Symptom Y/N Comments  Symptom Y/N Comments   Fever/Chills    Chest Pain     Poor Appetite    Edema     Cough    Abdominal Pain     Sputum    Joint Pain     SOB/WIN    Pruritis/Rash     Nausea/vomit    Tolerating PT/OT     Diarrhea    Tolerating Diet     Constipation    Other       Could NOT obtain due to:      Objective:     VITALS:   Last 24hrs VS reviewed since prior progress note. Most recent are:  Patient Vitals for the past 24 hrs:   Temp Pulse Resp BP SpO2   08/10/19 0711 98.1 °F (36.7 °C) 67 18 153/51 98 %   08/10/19 0257 98.4 °F (36.9 °C) 73 16 149/59 97 %   19 2311 98.1 °F (36.7 °C) 65 16 145/51 96 %   19 1939 98.1 °F (36.7 °C) 73 16 138/69 96 %   19 1823 -- 72 -- 120/60 --   19 1505 98 °F (36.7 °C) 70 16 93/50 96 %   19 1122 98.1 °F (36.7 °C) 65 16 150/46 98 %       Intake/Output Summary (Last 24 hours) at 8/10/2019 1121  Last data filed at 8/10/2019 0901  Gross per 24 hour   Intake 360 ml   Output 60 ml   Net 300 ml        PHYSICAL EXAM:  General: WD, WN. Alert, cooperative, no acute distress    EENT:  EOMI. Anicteric sclerae. MMM  Resp:  CTA bilaterally, no wheezing or rales. No accessory muscle use  CV:  Regular  rhythm,  No edema  GI:  Soft, Non distended, Non tender.  +Bowel sounds  Neurologic:  Alert and oriented X 3, normal speech,   Psych:   Good insight. Not anxious nor agitated  Skin:  No rashes.   No jaundice    Reviewed most current lab test results and cultures YES  Reviewed most current radiology test results   YES  Review and summation of old records today    NO  Reviewed patient's current orders and MAR    YES  PMH/SH reviewed - no change compared to H&P  ________________________________________________________________________  Care Plan discussed with:    Comments   Patient     Family      RN     Care Manager     Consultant                        Multidiciplinary team rounds were held today with , nursing, pharmacist and clinical coordinator. Patient's plan of care was discussed; medications were reviewed and discharge planning was addressed. ________________________________________________________________________  Total NON critical care TIME:  20   Minutes    Total CRITICAL CARE TIME Spent:   Minutes non procedure based      Comments   >50% of visit spent in counseling and coordination of care     ________________________________________________________________________  Army Giancarlo DO     Procedures: see electronic medical records for all procedures/Xrays and details which were not copied into this note but were reviewed prior to creation of Plan. LABS:  I reviewed today's most current labs and imaging studies.   Pertinent labs include:  Recent Labs     08/09/19  1108   WBC 8.4   HGB 11.7   HCT 36.3        Recent Labs     08/09/19  1108      K 3.9   *   CO2 26   *   BUN 15   CREA 1.14*   CA 8.7       Signed: Army Giancarlo DO

## 2019-08-10 NOTE — PROGRESS NOTES
General Surgery End of Shift Nursing Note    Bedside shift change report given to Olive Lopez RN (oncoming nurse) by Zoie Meneses RN (offgoing nurse). Report included the following information SBAR, Kardex, Intake/Output and Recent Results. Shift worked:   7A to [place] of shift:    Uneventful day,  Up ad daniel. Wound vac remains intact. DANA drain minimal drainage. Issues for physician to address:        Number times ambulated in hallway past shift: 2     Number of times OOB to chair past shift: 3    Pain Management:  Current medication: see MAR  Patient states pain is manageable on current pain medication: YES    GI:    Current diet:  DIET CARDIAC    Tolerating current diet: YES  Passing flatus: YES  Last Bowel Movement: several days ago 8/6/19   Appearance:     Respiratory:    Incentive Spirometer at bedside: YES  Patient instructed on use: YES    Patient Safety:    Falls Score: 2  Bed Alarm On? No  Sitter?  No    Christal Perkins RN

## 2019-08-11 PROCEDURE — 74011250636 HC RX REV CODE- 250/636: Performed by: SURGERY

## 2019-08-11 PROCEDURE — 74011250637 HC RX REV CODE- 250/637: Performed by: NEUROMUSCULOSKELETAL MEDICINE & OMM

## 2019-08-11 PROCEDURE — 74011250637 HC RX REV CODE- 250/637: Performed by: SURGERY

## 2019-08-11 PROCEDURE — 65270000029 HC RM PRIVATE

## 2019-08-11 RX ADMIN — MELATONIN 3 MG: at 20:26

## 2019-08-11 RX ADMIN — LABETALOL HYDROCHLORIDE 300 MG: 200 TABLET, FILM COATED ORAL at 11:19

## 2019-08-11 RX ADMIN — POLYETHYLENE GLYCOL 3350 17 G: 17 POWDER, FOR SOLUTION ORAL at 11:17

## 2019-08-11 RX ADMIN — GUANFACINE HYDROCHLORIDE 2 MG: 1 TABLET ORAL at 22:23

## 2019-08-11 RX ADMIN — ENOXAPARIN SODIUM 30 MG: 30 INJECTION, SOLUTION INTRAVENOUS; SUBCUTANEOUS at 05:48

## 2019-08-11 RX ADMIN — ATORVASTATIN CALCIUM 40 MG: 40 TABLET, FILM COATED ORAL at 20:25

## 2019-08-11 RX ADMIN — ASPIRIN 81 MG: 81 TABLET, COATED ORAL at 11:19

## 2019-08-11 RX ADMIN — LABETALOL HYDROCHLORIDE 300 MG: 200 TABLET, FILM COATED ORAL at 22:23

## 2019-08-11 RX ADMIN — FLUOXETINE 40 MG: 20 CAPSULE ORAL at 11:19

## 2019-08-11 RX ADMIN — CLOPIDOGREL BISULFATE 75 MG: 75 TABLET ORAL at 11:19

## 2019-08-11 RX ADMIN — AMLODIPINE BESYLATE 10 MG: 5 TABLET ORAL at 11:19

## 2019-08-11 NOTE — PROGRESS NOTES
General Surgery End of Shift Nursing Note     Bedside shift change report given to Aki Galvin RN (oncoming nurse) by Vero Salgado RN (offgoing nurse). Report included the following information SBAR, Kardex, Intake/Output and Recent Results.     Shift worked:   7p   Summary of shift:   Patient noted resting, no distress at this time. Patient with no c/o pain during shift. Patient states that she normally gets sleep aid. Pt. Advised to tell night RN that she would like prn.  Day RN made aware   Issues for physician to address: None      Number times ambulated in hallway past shift: 0        Number of times OOB to chair past shift: 1     Pain Management:  Current medication: see MAR  Patient states pain is manageable on current pain medication: YES     GI:     Current diet:  DIET CARDIAC    Tolerating current diet: YES  Passing flatus: YES  Last Bowel Movement:8/6/19                   Respiratory:     Incentive Spirometer at bedside: YES  Patient instructed on use: YES        Loretta Robertson RN

## 2019-08-11 NOTE — PROGRESS NOTES
Problem: Falls - Risk of  Goal: *Absence of Falls  Description  Document Rhiannon Delgadillo Fall Risk and appropriate interventions in the flowsheet. Outcome: Progressing Towards Goal  Note:   Fall Risk Interventions:  Mobility Interventions: Patient to call before getting OOB    Mentation Interventions: Adequate sleep, hydration, pain control    Medication Interventions: Teach patient to arise slowly    Elimination Interventions: Call light in reach    History of Falls Interventions: Door open when patient unattended         Problem: Pressure Injury - Risk of  Goal: *Prevention of pressure injury  Description  Document Juan Manuel Scale and appropriate interventions in the flowsheet.   Outcome: Progressing Towards Goal  Note:   Pressure Injury Interventions:  Sensory Interventions: Assess changes in LOC    Moisture Interventions: Absorbent underpads    Activity Interventions: Increase time out of bed    Mobility Interventions: HOB 30 degrees or less    Nutrition Interventions: Document food/fluid/supplement intake    Friction and Shear Interventions: HOB 30 degrees or less

## 2019-08-11 NOTE — PROGRESS NOTES
Pt does not want vital signs checked at this time or to get up yet, states she did not sleep well last night.

## 2019-08-11 NOTE — PROGRESS NOTES
Hospitalist Progress Note    NAME: Christi Adrian   :  1950   MRN:  [id number]       Assessment / Plan:  She is s/p right common femoral, profunda, and superficial femoral endarterectomy with patch and angioplasty on 2019 and Her post procedure course has been complicated by a right groin lymphatic leak. ands now Lymphocele after surgical procedure (2019)  Wound vac drain still in place , managent as per vascular   Medicine called to manage hypertension   Now on labetalol 300 mg and Norvasc 10   BP today within acceptable range   Patient said it has been high in my right arm than left arm all the time   Residual  Right side body weakness from stoke 7 years ago   Left common carotid artery is occluded at its origin CTA head and neck 2019  On Asprin Plavix   CKD-2 , follow up creatinine , needs OP follow up   Hx of  dementia with depression   On Prozac   Insomnia - improved with melatonin  hyperlipidemia on statins   DVT prophylaxis Lovenox  No need of Gi prophylaxis      Subjective:     Chief Complaint / Reason for Physician Visit  \"no complaints from patient , she said her BP on the right side is always high for long time   No head ace , no neck pain . \". Discussed with RN events overnight. Review of Systems:  Symptom Y/N Comments  Symptom Y/N Comments   Fever/Chills n   Chest Pain n    Poor Appetite    Edema     Cough    Abdominal Pain n    Sputum    Joint Pain     SOB/WIN n   Pruritis/Rash     Nausea/vomit    Tolerating PT/OT     Diarrhea    Tolerating Diet     Constipation    Other       Could NOT obtain due to:      Objective:     VITALS:   Last 24hrs VS reviewed since prior progress note.  Most recent are:  Patient Vitals for the past 24 hrs:   Temp Pulse Resp BP SpO2   19 1114 98.1 °F (36.7 °C) 69 17 117/55 98 %   19 0404 98 °F (36.7 °C) 70 16 126/60 100 %   08/10/19 2357 98.2 °F (36.8 °C) 70 16 152/70 100 %   08/10/19 2037 98.3 °F (36.8 °C) 68 18 162/63 98 % 08/10/19 1710 -- 66 -- 158/77 --   08/10/19 1709 -- 66 -- 107/59 --   08/10/19 1556 97.7 °F (36.5 °C) 72 18 98/74 100 %   08/10/19 1150 98 °F (36.7 °C) 65 18 112/52 100 %       Intake/Output Summary (Last 24 hours) at 8/11/2019 1133  Last data filed at 8/11/2019 1047  Gross per 24 hour   Intake 480 ml   Output 355 ml   Net 125 ml        PHYSICAL EXAM:  General: WD, WN. Alert, cooperative, no acute distress    EENT:  EOMI. Anicteric sclerae. MMM  Resp:  CTA bilaterally, no wheezing or rales. No accessory muscle use  CV:  Regular  rhythm,  No edema  GI:  Soft, Non distended, Non tender.  +Bowel sounds  Neurologic:  Alert and oriented X 3, normal speech,   Psych:   Good insight. Not anxious nor agitated  Skin:  Drain in place right groin     Reviewed most current lab test results and cultures  YES  Reviewed most current radiology test results   YES  Review and summation of old records today    NO  Reviewed patient's current orders and MAR    YES  PMH/ reviewed - no change compared to H&P  ________________________________________________________________________  Care Plan discussed with:    Comments   Patient y    Family      RN y    Care Manager     Consultant                        Multidiciplinary team rounds were held today with , nursing, pharmacist and clinical coordinator. Patient's plan of care was discussed; medications were reviewed and discharge planning was addressed. ________________________________________________________________________  Total NON critical care TIME:  20   Minutes    Total CRITICAL CARE TIME Spent:   Minutes non procedure based      Comments   >50% of visit spent in counseling and coordination of care     ________________________________________________________________________  Tania Gerardo MD     Procedures: see electronic medical records for all procedures/Xrays and details which were not copied into this note but were reviewed prior to creation of Plan. LABS:  I reviewed today's most current labs and imaging studies. Pertinent labs include:  Recent Labs     08/09/19  1108   WBC 8.4   HGB 11.7   HCT 36.3        Recent Labs     08/09/19  1108      K 3.9   *   CO2 26   *   BUN 15   CREA 1.14*   CA 8.7       Signed:  Nahomy Garcia MD

## 2019-08-12 LAB
ANION GAP SERPL CALC-SCNC: 6 MMOL/L (ref 5–15)
BUN SERPL-MCNC: 18 MG/DL (ref 6–20)
BUN/CREAT SERPL: 16 (ref 12–20)
CALCIUM SERPL-MCNC: 8.4 MG/DL (ref 8.5–10.1)
CHLORIDE SERPL-SCNC: 108 MMOL/L (ref 97–108)
CO2 SERPL-SCNC: 24 MMOL/L (ref 21–32)
CREAT SERPL-MCNC: 1.14 MG/DL (ref 0.55–1.02)
ERYTHROCYTE [DISTWIDTH] IN BLOOD BY AUTOMATED COUNT: 13.2 % (ref 11.5–14.5)
GLUCOSE SERPL-MCNC: 108 MG/DL (ref 65–100)
HCT VFR BLD AUTO: 32.7 % (ref 35–47)
HGB BLD-MCNC: 10.7 G/DL (ref 11.5–16)
MCH RBC QN AUTO: 28.9 PG (ref 26–34)
MCHC RBC AUTO-ENTMCNC: 32.7 G/DL (ref 30–36.5)
MCV RBC AUTO: 88.4 FL (ref 80–99)
NRBC # BLD: 0 K/UL (ref 0–0.01)
NRBC BLD-RTO: 0 PER 100 WBC
PLATELET # BLD AUTO: 290 K/UL (ref 150–400)
PMV BLD AUTO: 10.7 FL (ref 8.9–12.9)
POTASSIUM SERPL-SCNC: 3.9 MMOL/L (ref 3.5–5.1)
RBC # BLD AUTO: 3.7 M/UL (ref 3.8–5.2)
SODIUM SERPL-SCNC: 138 MMOL/L (ref 136–145)
WBC # BLD AUTO: 9.1 K/UL (ref 3.6–11)

## 2019-08-12 PROCEDURE — 36415 COLL VENOUS BLD VENIPUNCTURE: CPT

## 2019-08-12 PROCEDURE — 74011250637 HC RX REV CODE- 250/637: Performed by: NURSE PRACTITIONER

## 2019-08-12 PROCEDURE — 85027 COMPLETE CBC AUTOMATED: CPT

## 2019-08-12 PROCEDURE — 74011250637 HC RX REV CODE- 250/637: Performed by: NEUROMUSCULOSKELETAL MEDICINE & OMM

## 2019-08-12 PROCEDURE — 74011250636 HC RX REV CODE- 250/636: Performed by: SURGERY

## 2019-08-12 PROCEDURE — 74011250637 HC RX REV CODE- 250/637: Performed by: SURGERY

## 2019-08-12 PROCEDURE — 65270000029 HC RM PRIVATE

## 2019-08-12 PROCEDURE — 80048 BASIC METABOLIC PNL TOTAL CA: CPT

## 2019-08-12 RX ADMIN — OXYCODONE HYDROCHLORIDE 5 MG: 5 TABLET ORAL at 21:05

## 2019-08-12 RX ADMIN — FLUOXETINE 40 MG: 20 CAPSULE ORAL at 09:02

## 2019-08-12 RX ADMIN — AMLODIPINE BESYLATE 10 MG: 5 TABLET ORAL at 09:01

## 2019-08-12 RX ADMIN — ENOXAPARIN SODIUM 30 MG: 30 INJECTION, SOLUTION INTRAVENOUS; SUBCUTANEOUS at 06:02

## 2019-08-12 RX ADMIN — ASPIRIN 81 MG: 81 TABLET, COATED ORAL at 09:02

## 2019-08-12 RX ADMIN — GUANFACINE HYDROCHLORIDE 2 MG: 1 TABLET ORAL at 21:05

## 2019-08-12 RX ADMIN — LABETALOL HYDROCHLORIDE 300 MG: 200 TABLET, FILM COATED ORAL at 17:55

## 2019-08-12 RX ADMIN — POLYETHYLENE GLYCOL 3350 17 G: 17 POWDER, FOR SOLUTION ORAL at 09:01

## 2019-08-12 RX ADMIN — LABETALOL HYDROCHLORIDE 300 MG: 200 TABLET, FILM COATED ORAL at 09:02

## 2019-08-12 RX ADMIN — OXYCODONE HYDROCHLORIDE 5 MG: 5 TABLET ORAL at 03:17

## 2019-08-12 RX ADMIN — ATORVASTATIN CALCIUM 40 MG: 40 TABLET, FILM COATED ORAL at 21:05

## 2019-08-12 RX ADMIN — CLOPIDOGREL BISULFATE 75 MG: 75 TABLET ORAL at 09:02

## 2019-08-12 NOTE — PROGRESS NOTES
Pt alert and oriented x4. No c/o pain this shift. Tolerated meds and meals with no adverse reactions. R groin wound vac attached @125 continuous zero drainage noted in canister. DANA drain intact drain to gravity 10 ml out put this shift  .

## 2019-08-12 NOTE — PROGRESS NOTES
Bedside shift change report given to [address] (oncoming nurse) by Isha Moreau RN (offgoing nurse). Report included the following information SBAR, Kardex, ED Summary, OR Summary, Procedure Summary, Intake/Output, MAR, Recent Results, Med Rec Status, Alarm Parameters  and Quality Measures.

## 2019-08-12 NOTE — PROGRESS NOTES
No leaks but still putting out 50-60cc/24 hrs. Take DANA off suction. May take to OR WEDS and inject Tiffseal, remove DANA, close site and cover with wound vac.

## 2019-08-12 NOTE — PROGRESS NOTES
ANA Plan:                 *Home with [place]              *Home with wound vac    [employer] informed CM that Medicare will not cover a wound vac in the home setting for a closed wound. .  CM will inform NP and continue to follow.     David Raza  Ext [phone]

## 2019-08-12 NOTE — PROGRESS NOTES
Spiritual Care Assessment/Progress Note  [facility]      NAME: Ernesto Dominguez      MRN: [id number]  AGE: 71 y.o.  SEX: female  Gnosticist Affiliation: Mu-ism   Language: English     8/12/2019     Total Time (in minutes): 18     Spiritual Assessment begun in [facility] GENER[facility] through conversation with:         [x]Patient        [] Family    [] Friend(s)        Reason for Consult: Initial/Spiritual assessment, patient floor     Spiritual beliefs: (Please include comment if needed)     [x] Identifies with a osmani tradition:         [x] Supported by a osmani community:            [] Claims no spiritual orientation:           [] Seeking spiritual identity:                [] Adheres to an individual form of spirituality:           [] Not able to assess:                           Identified resources for coping:      [x] Prayer                               [] Music                  [] Guided Imagery     [x] Family/friends                 [] Pet visits     [] Devotional reading                         [] Unknown     [] Other:                                             Interventions offered during this visit: (See comments for more details)    Patient Interventions: Affirmation of osmani, Affirmation of emotions/emotional suffering, Iconic (affirming the presence of God/Higher Power), Prayer (actual), Prayer (assurance of), Gnosticist beliefs/image of God discussed           Plan of Care:     [] Support spiritual and/or cultural needs    [] Support AMD and/or advance care planning process      [] Support grieving process   [] Coordinate Rites and/or Rituals    [] Coordination with community clergy   [x] No spiritual needs identified at this time   [] Detailed Plan of Care below (See Comments)  [] Make referral to Music Therapy  [] Make referral to Pet Therapy     [] Make referral to Addiction services  [] Make referral to [facility]  [] Make referral to Spiritual Care Partner  [] No future visits requested        [] Follow up visits as needed     Comments: visited patient to make a spiritual assessment. The patient was alone and did not have visitors during the visit. The patient shared information about her medical journey and expressed thankfulness about her healing. The  provided a ministry of presence to the patient. When asked whether there was any spiritual support that could be provided, the patient responded with a request for prayer. Advised patient of  availability. Rev.  Joan Chilel MDiv   For  Assistance Page [phone])

## 2019-08-12 NOTE — PROGRESS NOTES
Per [facility] vascular paged per pt BP dramatically different in both arms. Just to make sure they are aware. Dr Bassam Summers paged by [place] at office. Dr Bassam Summers returned call he is aware of BP issues.

## 2019-08-12 NOTE — PROGRESS NOTES
Hospitalist Progress Note    NAME: Wilman Colvin   :  1950   MRN:  [id number]       Assessment / Plan:  s/p right common femoral, profunda, and superficial femoral endarterectomy with patch and angioplasty on 2019 and Her post procedure course has been complicated by a right groin lymphatic leak. ands now Lymphocele after surgical procedure (2019)  Wound vac drain still in place , managent as per vascular   To be taken to OR on WED  Medicine called to manage hypertension   Now on labetalol 300 mg and Norvasc 10   BP today within acceptable range   Patient said it has been high in my right arm than left arm all the time   Residual  Right side body weakness from stoke 7 years ago   Left common carotid artery is occluded at its origin CTA head and neck 2019  On Asprin Plavix   CKD-2 ,acute on chronic anemia  follow up creatinine , and hgb edwin op  needs OP follow up   Hx of  dementia with depression   On Prozac   Insomnia - improved with melatonin  hyperlipidemia on statins   DVT prophylaxis Lovenox  No need of Gi prophylaxis      Subjective:     Chief Complaint / Reason for Physician Visit  \"no complaints from patient , she said she may is taken to or soon .  at bed side answered question s\". Discussed with RN events overnight. Review of Systems:  Symptom Y/N Comments  Symptom Y/N Comments   Fever/Chills n   Chest Pain n    Poor Appetite    Edema     Cough    Abdominal Pain n    Sputum    Joint Pain     SOB/WIN n   Pruritis/Rash     Nausea/vomit    Tolerating PT/OT     Diarrhea    Tolerating Diet     Constipation    Other       Could NOT obtain due to:      Objective:     VITALS:   Last 24hrs VS reviewed since prior progress note.  Most recent are:  Patient Vitals for the past 24 hrs:   Temp Pulse Resp BP SpO2   19 0808 98.3 °F (36.8 °C) 68 16 148/52 98 %   19 0312 98 °F (36.7 °C) -- 18 100/64 98 %   19 0232 98 °F (36.7 °C) 67 16 150/53 99 %   19 2340 98.1 °F (36.7 °C) 67 17 106/57 98 %   08/11/19 2333 98.1 °F (36.7 °C) 65 16 91/47 98 %   08/11/19 1956 98 °F (36.7 °C) 66 16 153/55 98 %   08/11/19 1114 98.1 °F (36.7 °C) 69 17 117/55 98 %       Intake/Output Summary (Last 24 hours) at 8/12/2019 1043  Last data filed at 8/12/2019 0415  Gross per 24 hour   Intake 720 ml   Output 60 ml   Net 660 ml        PHYSICAL EXAM:  General: WD, WN. Alert, cooperative, no acute distress    EENT:  EOMI. Anicteric sclerae. MMM  Resp:  CTA bilaterally, no wheezing or rales. No accessory muscle use  CV:  Regular  rhythm,  No edema  GI:  Soft, Non distended, Non tender.  +Bowel sounds  Neurologic:  Alert and oriented X 3, normal speech,   Psych:   Good insight. Not anxious nor agitated  Skin:  Drain in place right groin     Reviewed most current lab test results and cultures  YES  Reviewed most current radiology test results   YES  Review and summation of old records today    NO  Reviewed patient's current orders and MAR    YES  PMH/ reviewed - no change compared to H&P  ________________________________________________________________________  Care Plan discussed with:    Comments   Patient y    Family      RN y    Care Manager     Consultant                        Multidiciplinary team rounds were held today with , nursing, pharmacist and clinical coordinator. Patient's plan of care was discussed; medications were reviewed and discharge planning was addressed.      ________________________________________________________________________  Total NON critical care TIME:  20   Minutes    Total CRITICAL CARE TIME Spent:   Minutes non procedure based      Comments   >50% of visit spent in counseling and coordination of care     ________________________________________________________________________  Emmanuel Burciaga MD     Procedures: see electronic medical records for all procedures/Xrays and details which were not copied into this note but were reviewed prior to creation of Plan. LABS:  I reviewed today's most current labs and imaging studies. Pertinent labs include:  Recent Labs     08/12/19  0320 08/09/19  1108   WBC 9.1 8.4   HGB 10.7* 11.7   HCT 32.7* 36.3    297     Recent Labs     08/12/19  0320 08/09/19  1108    142   K 3.9 3.9    109*   CO2 24 26   * 102*   BUN 18 15   CREA 1.14* 1.14*   CA 8.4* 8.7       Signed:  Duy Tan MD

## 2019-08-12 NOTE — WOUND CARE
Wound Care nurse reviewed chart for updated orders and notes. Dr. Vick Wallace will most likely take patient to the OR on Wednesday so we will leave the incisional Wound Vac in place until that time. Recommend that we attempt a Preveena incisional wound vac once the drain is removed below the incision. The Payton Sorensenman will continue with negative pressure wound therapy for 7 days and then the device / dressing is removed and discarded by the nurse removing it and a dry dressing placed.    Johnny Archuleta RN, BSN, [facility]

## 2019-08-13 ENCOUNTER — ANESTHESIA EVENT (OUTPATIENT)
Dept: SURGERY | Age: 69
DRG: 803 | End: 2019-08-13
Payer: MEDICARE

## 2019-08-13 PROCEDURE — 65270000029 HC RM PRIVATE

## 2019-08-13 PROCEDURE — 74011250637 HC RX REV CODE- 250/637: Performed by: NURSE PRACTITIONER

## 2019-08-13 PROCEDURE — 74011250637 HC RX REV CODE- 250/637: Performed by: SURGERY

## 2019-08-13 PROCEDURE — 74011250636 HC RX REV CODE- 250/636: Performed by: SURGERY

## 2019-08-13 PROCEDURE — 74011250637 HC RX REV CODE- 250/637: Performed by: NEUROMUSCULOSKELETAL MEDICINE & OMM

## 2019-08-13 RX ADMIN — LABETALOL HYDROCHLORIDE 300 MG: 200 TABLET, FILM COATED ORAL at 18:05

## 2019-08-13 RX ADMIN — ATORVASTATIN CALCIUM 40 MG: 40 TABLET, FILM COATED ORAL at 21:07

## 2019-08-13 RX ADMIN — POLYETHYLENE GLYCOL 3350 17 G: 17 POWDER, FOR SOLUTION ORAL at 09:46

## 2019-08-13 RX ADMIN — AMLODIPINE BESYLATE 10 MG: 5 TABLET ORAL at 09:45

## 2019-08-13 RX ADMIN — FLUOXETINE 40 MG: 20 CAPSULE ORAL at 09:46

## 2019-08-13 RX ADMIN — GUANFACINE HYDROCHLORIDE 2 MG: 1 TABLET ORAL at 21:07

## 2019-08-13 RX ADMIN — ENOXAPARIN SODIUM 30 MG: 30 INJECTION, SOLUTION INTRAVENOUS; SUBCUTANEOUS at 05:55

## 2019-08-13 RX ADMIN — CLOPIDOGREL BISULFATE 75 MG: 75 TABLET ORAL at 09:46

## 2019-08-13 RX ADMIN — OXYCODONE HYDROCHLORIDE 5 MG: 5 TABLET ORAL at 21:07

## 2019-08-13 RX ADMIN — LABETALOL HYDROCHLORIDE 300 MG: 200 TABLET, FILM COATED ORAL at 09:45

## 2019-08-13 RX ADMIN — ASPIRIN 81 MG: 81 TABLET, COATED ORAL at 09:46

## 2019-08-13 NOTE — PROGRESS NOTES
Nutrition Assessment:    RECOMMENDATIONS:   Continue Cardiac diet as tolerated    DIETITIANS INTERVENTIONS/PLAN:   Continue diet  Monitor appetite/PO intake    ASSESSMENT:   Pt admitted with lymphocele. PMH: CKD stage 2, dementia, CAD, CVA. Chart reviewed for LOS. Noted plan for OR tomorrow. Per RN flowsheet's, pt's appetite is good. Labs reviewed, WNL. BM noted yesterday. Will continue to monitor PO intake. SUBJECTIVE/OBJECTIVE:     Diet Order: Cardiac  % Eaten:    Patient Vitals for the past 72 hrs:   % Diet Eaten   08/11/19 1047 50 %     Pertinent Medications:miralax. Chemistries:  Lab Results   Component Value Date/Time    Sodium 138 08/12/2019 03:20 AM    Potassium 3.9 08/12/2019 03:20 AM    Chloride 108 08/12/2019 03:20 AM    CO2 24 08/12/2019 03:20 AM    Anion gap 6 08/12/2019 03:20 AM    Glucose 108 (H) 08/12/2019 03:20 AM    BUN 18 08/12/2019 03:20 AM    Creatinine 1.14 (H) 08/12/2019 03:20 AM    BUN/Creatinine ratio 16 08/12/2019 03:20 AM    GFR est AA 57 (L) 08/12/2019 03:20 AM    GFR est non-AA 47 (L) 08/12/2019 03:20 AM    Calcium 8.4 (L) 08/12/2019 03:20 AM    AST (SGOT) 13 (L) 07/11/2019 11:25 AM    Alk. phosphatase 93 07/11/2019 11:25 AM    Protein, total 6.7 07/11/2019 11:25 AM    Albumin 3.5 07/11/2019 11:25 AM    Globulin 3.2 07/11/2019 11:25 AM    A-G Ratio 1.1 07/11/2019 11:25 AM    ALT (SGPT) 19 07/11/2019 11:25 AM      Anthropometrics: Height: 5' 0.5\" (153.7 cm) Weight: 75.7 kg (166 lb 14.2 oz)  [x]bed scale/standing (8/5)   []stated   []unknown    IBW (%IBW):   ( ) UBW (%UBW):   (  %)    BMI: Body mass index is 32.06 kg/m². This BMI is indicative of:  []Underweight   []Normal   []Overweight   [x] Obesity   [] Extreme Obesity (BMI>40)  Estimated Nutrition Needs (Based on): 6835 Kcals/day(MSJ 1212 x 1.3) , 76 g(-91 (1-1.2gPro/kg) ) Protein  Carbohydrate:  At Least 130 g/day  Fluids: 1500 mL/day    Last BM: 8/12   [x]Active     []Hyperactive  []Hypoactive       [] Absent BS  Skin:    [] Intact   [x] Incision(wound vac-groin)  [] Breakdown   [] DTI   [] Tears/Excoriation/Abrasion  []Edema [] Other: Wt Readings from Last 30 Encounters:   08/05/19 75.7 kg (166 lb 14.2 oz)   07/13/19 77.8 kg (171 lb 8.3 oz)   07/11/19 78.7 kg (173 lb 8 oz)   11/07/18 76.2 kg (168 lb)   05/14/11 68 kg (150 lb)      NUTRITION DIAGNOSES:   Problem:  No nutritional diagnosis at this time        NUTRITION INTERVENTIONS:  Meals/Snacks: General/healthful diet                  GOAL:   Pt will consume >50% of meals in 4-6 days.      Cultural, Yazdanism, or Ethnic Dietary Needs: None     LEARNING NEEDS (Diet, Food/Nutrient-Drug Interaction):    [x] None Identified   [] Identified and Education Provided/Documented   [] Identified and Pt declined/was not appropriate      [x] Interdisciplinary Care Plan Reviewed/Documented    [x] Participated in Discharge Planning: Cardiac diet    [] Interdisciplinary Rounds     NUTRITION RISK:    [] High              [] Moderate           [x]  Low  []  Minimal/Uncompromised    PT SEEN FOR:    []  MD Consult: []Calorie Count      []Diabetic Diet Education        []Diet Education     []Electrolyte Management     []General Nutrition Management and Supplements     []Management of Tube Feeding     []TPN Recommendations    []  RN Referral:  []MST score >=2     []Enteral/Parenteral Nutrition PTA     []Pregnant: Gestational DM or Multigestation   []  Low BMI  []  Re-Screen   [x]  LOS   []  NPO/clears x 5 days   []  New TF/TPN    Lawanda Arndt RD, [address]   Pager [phone]  Weekend Pager [phone]

## 2019-08-13 NOTE — PROGRESS NOTES
Hospitalist Progress Note    NAME: Polly Madrigal   :  1950   MRN:  [id number]       Assessment / Plan:  Medicine called to manage hypertension   Now on labetalol 300 mg and Norvasc 10   BP stable    Patient said it has been high in my right arm than left arm all the time     s/p right common femoral, profunda, and superficial femoral endarterectomy with patch and angioplasty on 2019 and Her post procedure course has been complicated by a right groin lymphatic leak. ands now Lymphocele after surgical procedure (2019)  Wound vac drain still in place , managent as per vascular   To be taken to OR on WED    Residual  Right side body weakness from stoke 7 years ago   Left common carotid artery is occluded at its origin CTA head and neck 2019  On Asprin Plavix     CKD-2 ,acute on chronic anemia  follow up creatinine , and hgb edwin op  needs OP follow up     Hx of  dementia with depression   On Prozac   Insomnia - improved with melatonin  hyperlipidemia on statins     BP stable, will sign off at this time, continue current medications. Recall as needed       Subjective:     Chief Complaint / Reason for Physician Visit  Pt seen and examined  No new issues  Doing well   No chest pain, sob      Review of Systems:  Symptom Y/N Comments  Symptom Y/N Comments   Fever/Chills n   Chest Pain n    Poor Appetite    Edema     Cough    Abdominal Pain n    Sputum    Joint Pain     SOB/WIN n   Pruritis/Rash     Nausea/vomit    Tolerating PT/OT     Diarrhea    Tolerating Diet     Constipation    Other       Could NOT obtain due to:      Objective:     VITALS:   Last 24hrs VS reviewed since prior progress note.  Most recent are:  Patient Vitals for the past 24 hrs:   Temp Pulse Resp BP SpO2   19 0814 -- -- -- -- 100 %   19 0722 97.6 °F (36.4 °C) 78 17 151/54 100 %   19 0311 -- -- -- 113/61 --   19 0310 97.7 °F (36.5 °C) 72 15 131/52 96 %   19 2331 97.8 °F (36.6 °C) 64 16 122/51 99 %   08/12/19 2330 97.8 °F (36.6 °C) 64 16 (!) 87/50 99 %   08/12/19 1947 98 °F (36.7 °C) 67 16 (!) 141/118 99 %   08/12/19 1942 97.9 °F (36.6 °C) 67 15 99/48 99 %   08/12/19 1447 -- -- -- 148/57 --   08/12/19 1445 98 °F (36.7 °C) 70 18 98/40 100 %   08/12/19 1119 -- -- -- 134/62 --   08/12/19 1117 97.8 °F (36.6 °C) 63 18 105/47 100 %       Intake/Output Summary (Last 24 hours) at 8/13/2019 1114  Last data filed at 8/13/2019 1018  Gross per 24 hour   Intake 200 ml   Output 50 ml   Net 150 ml        PHYSICAL EXAM:  General: WD, WN. Alert, cooperative, no acute distress    EENT:  EOMI. Anicteric sclerae. MMM  Resp:  CTA bilaterally, no wheezing or rales. No accessory muscle use  CV:  Regular  rhythm,  No edema  GI:  Soft, Non distended, Non tender.  +Bowel sounds  Neurologic:  Alert and oriented X 3, normal speech,   Psych:   Good insight. Not anxious nor agitated  Skin:  Drain in place right groin     Reviewed most current lab test results and cultures  YES  Reviewed most current radiology test results   YES  Review and summation of old records today    NO  Reviewed patient's current orders and MAR    YES  PMH/ reviewed - no change compared to H&P  ________________________________________________________________________  Care Plan discussed with:    Comments   Patient y    Family      RN y    Care Manager     Consultant                        Multidiciplinary team rounds were held today with , nursing, pharmacist and clinical coordinator. Patient's plan of care was discussed; medications were reviewed and discharge planning was addressed.      ________________________________________________________________________  Total NON critical care TIME:  20   Minutes    Total CRITICAL CARE TIME Spent:   Minutes non procedure based      Comments   >50% of visit spent in counseling and coordination of care     ________________________________________________________________________  Jean Pierre Das MD Procedures: see electronic medical records for all procedures/Xrays and details which were not copied into this note but were reviewed prior to creation of Plan. LABS:  I reviewed today's most current labs and imaging studies.   Pertinent labs include:  Recent Labs     08/12/19  0320   WBC 9.1   HGB 10.7*   HCT 32.7*        Recent Labs     08/12/19  0320      K 3.9      CO2 24   *   BUN 18   CREA 1.14*   CA 8.4*       Signed: Evelyn Lyons MD

## 2019-08-13 NOTE — PROGRESS NOTES
Stable but put out 70cc last 24 hrs.     To OR latoya for tifseal, sabrina removal/closure and wound vac

## 2019-08-13 NOTE — PROGRESS NOTES
General Surgery End of Shift Nursing Note    Bedside shift change report given to Attila Wagner RN (oncoming nurse) by [facility]  (offgoing nurse). Report included the following information SBAR, Kardex, Procedure Summary, Intake/Output and MAR. Shift worked:   7a- 7p   Summary of shift:    DANA dressing changed twice. No complaints of pain. Patient frequently ambulated in room. Issues for physician to address:   none     Number times ambulated in hallway past shift: 0    Number of times OOB to chair past shift: 0    Pain Management:  Current medication: Roxicodone   Patient states pain is manageable on current pain medication: yes    GI:    Current diet:  DIET CARDIAC    Tolerating current diet: YES  Passing flatus: YES  Last Bowel Movement: yesterday          Patient Safety:    Falls Score:2  Bed Alarm On? No  Sitter?  No    Dandre Dillon

## 2019-08-13 NOTE — PROGRESS NOTES
Bedside shift change report given to Shirin Guerrero RN (oncoming nurse) by Maryuri Carmona RN (offgoing nurse). Report included the following information SBAR, Kardex, ED Summary, OR Summary, Procedure Summary, Intake/Output, MAR, Recent Results, Alarm Parameters  and Quality Measures.

## 2019-08-14 ENCOUNTER — ANESTHESIA (OUTPATIENT)
Dept: SURGERY | Age: 69
DRG: 803 | End: 2019-08-14
Payer: MEDICARE

## 2019-08-14 LAB
ANION GAP SERPL CALC-SCNC: 9 MMOL/L (ref 5–15)
BUN SERPL-MCNC: 15 MG/DL (ref 6–20)
BUN/CREAT SERPL: 14 (ref 12–20)
CALCIUM SERPL-MCNC: 8.5 MG/DL (ref 8.5–10.1)
CHLORIDE SERPL-SCNC: 109 MMOL/L (ref 97–108)
CO2 SERPL-SCNC: 24 MMOL/L (ref 21–32)
CREAT SERPL-MCNC: 1.1 MG/DL (ref 0.55–1.02)
ERYTHROCYTE [DISTWIDTH] IN BLOOD BY AUTOMATED COUNT: 13.2 % (ref 11.5–14.5)
GLUCOSE SERPL-MCNC: 107 MG/DL (ref 65–100)
HCT VFR BLD AUTO: 35.1 % (ref 35–47)
HGB BLD-MCNC: 11.4 G/DL (ref 11.5–16)
MCH RBC QN AUTO: 29 PG (ref 26–34)
MCHC RBC AUTO-ENTMCNC: 32.5 G/DL (ref 30–36.5)
MCV RBC AUTO: 89.3 FL (ref 80–99)
NRBC # BLD: 0 K/UL (ref 0–0.01)
NRBC BLD-RTO: 0 PER 100 WBC
PLATELET # BLD AUTO: 315 K/UL (ref 150–400)
PMV BLD AUTO: 10.3 FL (ref 8.9–12.9)
POTASSIUM SERPL-SCNC: 3.9 MMOL/L (ref 3.5–5.1)
RBC # BLD AUTO: 3.93 M/UL (ref 3.8–5.2)
SODIUM SERPL-SCNC: 142 MMOL/L (ref 136–145)
WBC # BLD AUTO: 7.1 K/UL (ref 3.6–11)

## 2019-08-14 PROCEDURE — 76010000149 HC OR TIME 1 TO 1.5 HR: Performed by: SURGERY

## 2019-08-14 PROCEDURE — 80048 BASIC METABOLIC PNL TOTAL CA: CPT

## 2019-08-14 PROCEDURE — 74011250636 HC RX REV CODE- 250/636: Performed by: SURGERY

## 2019-08-14 PROCEDURE — 77030002986 HC SUT PROL J&J -A: Performed by: SURGERY

## 2019-08-14 PROCEDURE — 77030020143 HC AIRWY LARYN INTUB CGAS -A: Performed by: NURSE ANESTHETIST, CERTIFIED REGISTERED

## 2019-08-14 PROCEDURE — 74011250636 HC RX REV CODE- 250/636: Performed by: NURSE ANESTHETIST, CERTIFIED REGISTERED

## 2019-08-14 PROCEDURE — 65270000029 HC RM PRIVATE

## 2019-08-14 PROCEDURE — 74011000250 HC RX REV CODE- 250: Performed by: SURGERY

## 2019-08-14 PROCEDURE — 74011250637 HC RX REV CODE- 250/637: Performed by: SURGERY

## 2019-08-14 PROCEDURE — 77030031139 HC SUT VCRL2 J&J -A: Performed by: SURGERY

## 2019-08-14 PROCEDURE — 74011000250 HC RX REV CODE- 250: Performed by: NURSE ANESTHETIST, CERTIFIED REGISTERED

## 2019-08-14 PROCEDURE — 76060000033 HC ANESTHESIA 1 TO 1.5 HR: Performed by: SURGERY

## 2019-08-14 PROCEDURE — 77030011640 HC PAD GRND REM COVD -A: Performed by: SURGERY

## 2019-08-14 PROCEDURE — 85027 COMPLETE CBC AUTOMATED: CPT

## 2019-08-14 PROCEDURE — 77030019908 HC STETH ESOPH SIMS -A: Performed by: NURSE ANESTHETIST, CERTIFIED REGISTERED

## 2019-08-14 PROCEDURE — 76210000006 HC OR PH I REC 0.5 TO 1 HR: Performed by: SURGERY

## 2019-08-14 PROCEDURE — 77030018836 HC SOL IRR NACL ICUM -A: Performed by: SURGERY

## 2019-08-14 PROCEDURE — 77030019952 HC CANSTR VAC ASST KCON -B: Performed by: SURGERY

## 2019-08-14 PROCEDURE — 77030013533 HC SEAL FBRN TISSL RDYTUSE 10ML BAXT -E: Performed by: SURGERY

## 2019-08-14 PROCEDURE — 36415 COLL VENOUS BLD VENIPUNCTURE: CPT

## 2019-08-14 PROCEDURE — 77030018390 HC SPNG HEMSTAT2 J&J -B: Performed by: SURGERY

## 2019-08-14 PROCEDURE — 3E0W3TZ INTRODUCTION OF DESTRUCTIVE AGENT INTO LYMPHATICS, PERCUTANEOUS APPROACH: ICD-10-PCS | Performed by: SURGERY

## 2019-08-14 PROCEDURE — 74011000258 HC RX REV CODE- 258: Performed by: SURGERY

## 2019-08-14 PROCEDURE — 74011250636 HC RX REV CODE- 250/636: Performed by: ANESTHESIOLOGY

## 2019-08-14 PROCEDURE — 0YQ50ZZ REPAIR RIGHT INGUINAL REGION, OPEN APPROACH: ICD-10-PCS | Performed by: SURGERY

## 2019-08-14 RX ORDER — SODIUM CHLORIDE 0.9 % (FLUSH) 0.9 %
5-40 SYRINGE (ML) INJECTION AS NEEDED
Status: DISCONTINUED | OUTPATIENT
Start: 2019-08-14 | End: 2019-08-14 | Stop reason: HOSPADM

## 2019-08-14 RX ORDER — CEFAZOLIN SODIUM/WATER 2 G/20 ML
2 SYRINGE (ML) INTRAVENOUS
Status: COMPLETED | OUTPATIENT
Start: 2019-08-14 | End: 2019-08-14

## 2019-08-14 RX ORDER — MIDAZOLAM HYDROCHLORIDE 1 MG/ML
INJECTION, SOLUTION INTRAMUSCULAR; INTRAVENOUS AS NEEDED
Status: DISCONTINUED | OUTPATIENT
Start: 2019-08-14 | End: 2019-08-14 | Stop reason: HOSPADM

## 2019-08-14 RX ORDER — BUPIVACAINE HYDROCHLORIDE AND EPINEPHRINE 5; 5 MG/ML; UG/ML
INJECTION, SOLUTION EPIDURAL; INTRACAUDAL; PERINEURAL AS NEEDED
Status: DISCONTINUED | OUTPATIENT
Start: 2019-08-14 | End: 2019-08-14 | Stop reason: HOSPADM

## 2019-08-14 RX ORDER — SODIUM CHLORIDE 0.9 % (FLUSH) 0.9 %
5-40 SYRINGE (ML) INJECTION EVERY 8 HOURS
Status: DISCONTINUED | OUTPATIENT
Start: 2019-08-14 | End: 2019-08-14 | Stop reason: HOSPADM

## 2019-08-14 RX ORDER — FENTANYL CITRATE 50 UG/ML
INJECTION, SOLUTION INTRAMUSCULAR; INTRAVENOUS AS NEEDED
Status: DISCONTINUED | OUTPATIENT
Start: 2019-08-14 | End: 2019-08-14 | Stop reason: HOSPADM

## 2019-08-14 RX ORDER — HYDROMORPHONE HYDROCHLORIDE 1 MG/ML
.2-.5 INJECTION, SOLUTION INTRAMUSCULAR; INTRAVENOUS; SUBCUTANEOUS
Status: DISCONTINUED | OUTPATIENT
Start: 2019-08-14 | End: 2019-08-14 | Stop reason: HOSPADM

## 2019-08-14 RX ORDER — DEXAMETHASONE SODIUM PHOSPHATE 4 MG/ML
INJECTION, SOLUTION INTRA-ARTICULAR; INTRALESIONAL; INTRAMUSCULAR; INTRAVENOUS; SOFT TISSUE AS NEEDED
Status: DISCONTINUED | OUTPATIENT
Start: 2019-08-14 | End: 2019-08-14 | Stop reason: HOSPADM

## 2019-08-14 RX ORDER — SODIUM CHLORIDE, SODIUM LACTATE, POTASSIUM CHLORIDE, CALCIUM CHLORIDE 600; 310; 30; 20 MG/100ML; MG/100ML; MG/100ML; MG/100ML
INJECTION, SOLUTION INTRAVENOUS
Status: DISCONTINUED | OUTPATIENT
Start: 2019-08-14 | End: 2019-08-14 | Stop reason: HOSPADM

## 2019-08-14 RX ORDER — LIDOCAINE HYDROCHLORIDE 10 MG/ML
0.1 INJECTION, SOLUTION EPIDURAL; INFILTRATION; INTRACAUDAL; PERINEURAL AS NEEDED
Status: DISCONTINUED | OUTPATIENT
Start: 2019-08-14 | End: 2019-08-16 | Stop reason: HOSPADM

## 2019-08-14 RX ORDER — CEFAZOLIN SODIUM/WATER 2 G/20 ML
2 SYRINGE (ML) INTRAVENOUS EVERY 8 HOURS
Status: DISCONTINUED | OUTPATIENT
Start: 2019-08-14 | End: 2019-08-14 | Stop reason: DRUGHIGH

## 2019-08-14 RX ORDER — PROPOFOL 10 MG/ML
INJECTION, EMULSION INTRAVENOUS AS NEEDED
Status: DISCONTINUED | OUTPATIENT
Start: 2019-08-14 | End: 2019-08-14 | Stop reason: HOSPADM

## 2019-08-14 RX ORDER — ONDANSETRON 2 MG/ML
INJECTION INTRAMUSCULAR; INTRAVENOUS AS NEEDED
Status: DISCONTINUED | OUTPATIENT
Start: 2019-08-14 | End: 2019-08-14 | Stop reason: HOSPADM

## 2019-08-14 RX ORDER — HYDROMORPHONE HYDROCHLORIDE 2 MG/ML
INJECTION, SOLUTION INTRAMUSCULAR; INTRAVENOUS; SUBCUTANEOUS AS NEEDED
Status: DISCONTINUED | OUTPATIENT
Start: 2019-08-14 | End: 2019-08-14 | Stop reason: HOSPADM

## 2019-08-14 RX ORDER — MORPHINE SULFATE 10 MG/ML
2 INJECTION, SOLUTION INTRAMUSCULAR; INTRAVENOUS
Status: DISCONTINUED | OUTPATIENT
Start: 2019-08-14 | End: 2019-08-14 | Stop reason: HOSPADM

## 2019-08-14 RX ORDER — DIPHENHYDRAMINE HYDROCHLORIDE 50 MG/ML
12.5 INJECTION, SOLUTION INTRAMUSCULAR; INTRAVENOUS AS NEEDED
Status: DISCONTINUED | OUTPATIENT
Start: 2019-08-14 | End: 2019-08-14 | Stop reason: HOSPADM

## 2019-08-14 RX ORDER — FENTANYL CITRATE 50 UG/ML
25 INJECTION, SOLUTION INTRAMUSCULAR; INTRAVENOUS
Status: DISCONTINUED | OUTPATIENT
Start: 2019-08-14 | End: 2019-08-14 | Stop reason: HOSPADM

## 2019-08-14 RX ORDER — EPHEDRINE SULFATE/0.9% NACL/PF 50 MG/5 ML
SYRINGE (ML) INTRAVENOUS AS NEEDED
Status: DISCONTINUED | OUTPATIENT
Start: 2019-08-14 | End: 2019-08-14 | Stop reason: HOSPADM

## 2019-08-14 RX ORDER — LIDOCAINE HYDROCHLORIDE 20 MG/ML
INJECTION, SOLUTION EPIDURAL; INFILTRATION; INTRACAUDAL; PERINEURAL AS NEEDED
Status: DISCONTINUED | OUTPATIENT
Start: 2019-08-14 | End: 2019-08-14 | Stop reason: HOSPADM

## 2019-08-14 RX ORDER — GLYCOPYRROLATE 0.2 MG/ML
INJECTION INTRAMUSCULAR; INTRAVENOUS AS NEEDED
Status: DISCONTINUED | OUTPATIENT
Start: 2019-08-14 | End: 2019-08-14 | Stop reason: HOSPADM

## 2019-08-14 RX ORDER — SODIUM CHLORIDE, SODIUM LACTATE, POTASSIUM CHLORIDE, CALCIUM CHLORIDE 600; 310; 30; 20 MG/100ML; MG/100ML; MG/100ML; MG/100ML
25 INJECTION, SOLUTION INTRAVENOUS CONTINUOUS
Status: DISCONTINUED | OUTPATIENT
Start: 2019-08-14 | End: 2019-08-16 | Stop reason: HOSPADM

## 2019-08-14 RX ORDER — FENTANYL CITRATE 50 UG/ML
50 INJECTION, SOLUTION INTRAMUSCULAR; INTRAVENOUS AS NEEDED
Status: DISCONTINUED | OUTPATIENT
Start: 2019-08-14 | End: 2019-08-16 | Stop reason: HOSPADM

## 2019-08-14 RX ORDER — MIDAZOLAM HYDROCHLORIDE 1 MG/ML
0.5 INJECTION, SOLUTION INTRAMUSCULAR; INTRAVENOUS
Status: DISCONTINUED | OUTPATIENT
Start: 2019-08-14 | End: 2019-08-14 | Stop reason: HOSPADM

## 2019-08-14 RX ADMIN — DEXAMETHASONE SODIUM PHOSPHATE 4 MG: 4 INJECTION, SOLUTION INTRAMUSCULAR; INTRAVENOUS at 12:40

## 2019-08-14 RX ADMIN — AMLODIPINE BESYLATE 10 MG: 5 TABLET ORAL at 09:03

## 2019-08-14 RX ADMIN — Medication 2 G: at 12:34

## 2019-08-14 RX ADMIN — FENTANYL CITRATE 25 MCG: 50 INJECTION, SOLUTION INTRAMUSCULAR; INTRAVENOUS at 12:34

## 2019-08-14 RX ADMIN — MIDAZOLAM HYDROCHLORIDE 1 MG: 1 INJECTION INTRAMUSCULAR; INTRAVENOUS at 12:21

## 2019-08-14 RX ADMIN — LABETALOL HYDROCHLORIDE 300 MG: 200 TABLET, FILM COATED ORAL at 09:03

## 2019-08-14 RX ADMIN — CEFAZOLIN 1 G: 1 INJECTION, POWDER, FOR SOLUTION INTRAMUSCULAR; INTRAVENOUS at 20:52

## 2019-08-14 RX ADMIN — SODIUM CHLORIDE, POTASSIUM CHLORIDE, SODIUM LACTATE AND CALCIUM CHLORIDE: 600; 310; 30; 20 INJECTION, SOLUTION INTRAVENOUS at 12:01

## 2019-08-14 RX ADMIN — Medication 10 MG: at 12:39

## 2019-08-14 RX ADMIN — FLUOXETINE 40 MG: 20 CAPSULE ORAL at 09:03

## 2019-08-14 RX ADMIN — FENTANYL CITRATE 25 MCG: 50 INJECTION, SOLUTION INTRAMUSCULAR; INTRAVENOUS at 12:31

## 2019-08-14 RX ADMIN — GLYCOPYRROLATE 0.1 MG: 0.2 INJECTION, SOLUTION INTRAMUSCULAR; INTRAVENOUS at 12:47

## 2019-08-14 RX ADMIN — SODIUM CHLORIDE, SODIUM LACTATE, POTASSIUM CHLORIDE, AND CALCIUM CHLORIDE 25 ML/HR: 600; 310; 30; 20 INJECTION, SOLUTION INTRAVENOUS at 11:34

## 2019-08-14 RX ADMIN — LIDOCAINE HYDROCHLORIDE 100 MG: 20 INJECTION, SOLUTION INTRAVENOUS at 12:28

## 2019-08-14 RX ADMIN — HYDROMORPHONE HYDROCHLORIDE 0.2 MG: 2 INJECTION, SOLUTION INTRAMUSCULAR; INTRAVENOUS; SUBCUTANEOUS at 13:03

## 2019-08-14 RX ADMIN — HYDROMORPHONE HYDROCHLORIDE 0.4 MG: 2 INJECTION, SOLUTION INTRAMUSCULAR; INTRAVENOUS; SUBCUTANEOUS at 12:58

## 2019-08-14 RX ADMIN — Medication 10 MG: at 12:46

## 2019-08-14 RX ADMIN — GUANFACINE HYDROCHLORIDE 2 MG: 1 TABLET ORAL at 20:52

## 2019-08-14 RX ADMIN — ONDANSETRON HYDROCHLORIDE 4 MG: 2 INJECTION, SOLUTION INTRAMUSCULAR; INTRAVENOUS at 12:52

## 2019-08-14 RX ADMIN — PROPOFOL 150 MG: 10 INJECTION, EMULSION INTRAVENOUS at 12:28

## 2019-08-14 RX ADMIN — GLYCOPYRROLATE 0.1 MG: 0.2 INJECTION, SOLUTION INTRAMUSCULAR; INTRAVENOUS at 12:40

## 2019-08-14 RX ADMIN — ATORVASTATIN CALCIUM 40 MG: 40 TABLET, FILM COATED ORAL at 20:52

## 2019-08-14 RX ADMIN — PHENYLEPHRINE HYDROCHLORIDE 30 MCG: 10 INJECTION INTRAVENOUS at 12:36

## 2019-08-14 RX ADMIN — OXYCODONE HYDROCHLORIDE 5 MG: 5 TABLET ORAL at 20:52

## 2019-08-14 RX ADMIN — FENTANYL CITRATE 25 MCG: 50 INJECTION, SOLUTION INTRAMUSCULAR; INTRAVENOUS at 12:38

## 2019-08-14 RX ADMIN — FENTANYL CITRATE 25 MCG: 50 INJECTION, SOLUTION INTRAMUSCULAR; INTRAVENOUS at 12:42

## 2019-08-14 RX ADMIN — LABETALOL HYDROCHLORIDE 300 MG: 200 TABLET, FILM COATED ORAL at 18:43

## 2019-08-14 RX ADMIN — LIDOCAINE HYDROCHLORIDE 60 ML: 10 INJECTION, SOLUTION EPIDURAL; INFILTRATION; INTRACAUDAL; PERINEURAL at 13:00

## 2019-08-14 NOTE — PROGRESS NOTES
Bedside shift change report given to [address] (oncoming nurse) by Hayes Ewing RN (offgoing nurse). Report included the following information SBAR, Kardex, ED Summary, OR Summary, Procedure Summary, Intake/Output, MAR, Recent Results, Alarm Parameters , Pre Procedure Checklist and Quality Measures.

## 2019-08-14 NOTE — PERIOP NOTES
Handoff Report from Operating Room to PACU    Report received from [address] [place] [address] regarding Kingsley Jernigan. Surgeon(s):  Jorge Luis Castano MD  And Procedure(s) (LRB):   CLOSURE RIGHT GROIN LYMPHOCELE WITH INJECTION OF DOXCYCLINE INTO RIGHT GROIN LYMPHOCELE  AND APPLICATION OF WOUND VAC (N/A)  confirmed   with allergies and dressings discussed. Anesthesia type, drugs, patient history, complications, estimated blood loss, vital signs, intake and output, and last pain medication, lines and temperature were reviewed.

## 2019-08-14 NOTE — PERIOP NOTES
TRANSFER - IN REPORT:    Verbal report received from [address] [place] [address] on [address]  being received from 2113 for ordered procedure      Report consisted of patients Situation, Background, Assessment and   Recommendations(SBAR). Information from the following report(s) SBAR, OR Summary, Procedure Summary, Intake/Output and MAR was reviewed with the receiving nurse. Opportunity for questions and clarification was provided. Assessment completed upon patients arrival to unit and care assumed.

## 2019-08-14 NOTE — PROGRESS NOTES
ANA Plan:                 *Home with Chari Campbell [address] with wound vac    Per consult, CM discussed the need for a home aid with patients . Patients  stated that a home aid is not necessary. [facility]. CM will continue to follow.     David Raza  Ext [phone]

## 2019-08-14 NOTE — ANESTHESIA PREPROCEDURE EVALUATION
Relevant Problems   No relevant active problems       Anesthetic History   No history of anesthetic complications            Review of Systems / Medical History  Patient summary reviewed, nursing notes reviewed and pertinent labs reviewed    Pulmonary        Sleep apnea: No treatment  Smoker         Neuro/Psych       CVA  TIA and psychiatric history    Comments: Depression  Cardiovascular    Hypertension          CAD, PAD and hyperlipidemia    Exercise tolerance: >4 METS  Comments: R Carotid artery stent  L Carotid is 100% Occluded     GI/Hepatic/Renal  Within defined limits              Endo/Other        Obesity and arthritis     Other Findings   Comments: RIGHT GROIN LYMPHOCELE  DDD           Physical Exam    Airway  Mallampati: III  TM Distance: 4 - 6 cm  Neck ROM: normal range of motion   Mouth opening: Diminished (comment)     Cardiovascular  Regular rate and rhythm,  S1 and S2 normal,  no murmur, click, rub, or gallop             Dental  No notable dental hx       Pulmonary  Breath sounds clear to auscultation               Abdominal  GI exam deferred       Other Findings            Anesthetic Plan    ASA: 3  Anesthesia type: general    Monitoring Plan: BIS        Anesthetic plan and risks discussed with: Patient

## 2019-08-14 NOTE — PROGRESS NOTES
Alert and oriented x4. No c/o pain noted this shift. Iv clean dry intact. woundvac to r groin continous @125, no drainage noted this shift. tolerated meals and meds with no adverse reaction. continued to monitor bilateral bp and manage with medication. no further issues this shift

## 2019-08-14 NOTE — BRIEF OP NOTE
BRIEF OPERATIVE NOTE    Date of Procedure: 8/14/2019   Preoperative Diagnosis: LYMPHOCELE  Postoperative Diagnosis: LYMPHOCELE    Procedure(s):   CLOSURE RIGHT GROIN LYMPHOCELE WITH INJECTION OF DOXCYCLINE INTO RIGHT GROIN LYMPHOCELE  AND APPLICATION OF WOUND VAC  Surgeon(s) and Role:     * Mikhail Martínez MD - Primary         Surgical Assistant: chung    Surgical Staff:  Circ-1: Aaron Taylor RN  Circ-Relief: Anum Edwards RN  Circ-Intern: Gio Gomes  Scrub Tech-1: Joseph Etienne  Surg Asst-1: Jenny Mari  Event Time In Time Out   Incision Start 1245    Incision Close       Anesthesia: General   Estimated Blood Loss: min  Specimens: * No specimens in log *   Findings: leak   Complications: 0  Implants: * No implants in log *

## 2019-08-14 NOTE — ANESTHESIA POSTPROCEDURE EVALUATION
Procedure(s):   CLOSURE RIGHT GROIN LYMPHOCELE WITH INJECTION OF DOXCYCLINE INTO RIGHT GROIN LYMPHOCELE  AND APPLICATION OF WOUND VAC. general    Anesthesia Post Evaluation        Patient location during evaluation: PACU  Note status: Adequate. Level of consciousness: responsive to verbal stimuli and sleepy but conscious  Pain management: satisfactory to patient  Airway patency: patent  Anesthetic complications: no  Cardiovascular status: acceptable  Respiratory status: acceptable  Hydration status: acceptable  Comments: +Post-Anesthesia Evaluation and Assessment    Patient: Dom Wen MRN: [id number]  SSN: [id number]   YOB: 1950  Age: 71 y.o. Sex: female      Cardiovascular Function/Vital Signs    /45 (BP 1 Location: Right arm, BP Patient Position: At rest)   Pulse 72   Temp 36.6 °C (97.9 °F)   Resp 8   Ht 5' 0.5\" (1.537 m)   Wt 75.7 kg (166 lb 14.2 oz)   SpO2 98%   BMI 32.06 kg/m²     Patient is status post Procedure(s):   CLOSURE RIGHT GROIN LYMPHOCELE WITH INJECTION OF DOXCYCLINE INTO RIGHT GROIN LYMPHOCELE  AND APPLICATION OF WOUND VAC. Nausea/Vomiting: Controlled. Postoperative hydration reviewed and adequate. Pain:  Pain Scale 1: Numeric (0 - 10) (08/14/19 1400)  Pain Intensity 1: 0 (08/14/19 1400)   Managed. Neurological Status:   Neuro (WDL): Exceptions to WDL (08/14/19 1345)   At baseline. Mental Status and Level of Consciousness: Arousable. Pulmonary Status:   O2 Device: Nasal cannula (08/14/19 1400)   Adequate oxygenation and airway patent. Complications related to anesthesia: None    Post-anesthesia assessment completed. No concerns.     Signed By: Sherita Barajas MD    8/14/2019  Post anesthesia nausea and vomiting:  controlled      Vitals Value Taken Time   /45 8/14/2019  2:00 PM   Temp 36.6 °C (97.9 °F) 8/14/2019  1:45 PM   Pulse 69 8/14/2019  2:11 PM   Resp 8 8/14/2019  2:11 PM   SpO2 97 % 8/14/2019  2:11 PM   Vitals shown include unvalidated device data.

## 2019-08-14 NOTE — PROGRESS NOTES
TRANSFER - OUT REPORT:    Verbal report given to LUCIEN Moran(name) on Gayathri Whiting  being transferred to 2113  (unit) for routine progression of care       Report consisted of patients Situation, Background, Assessment and   Recommendations(SBAR). Information from the following report(s) SBAR, OR Summary, Procedure Summary, Intake/Output and MAR was reviewed with the receiving nurse. Opportunity for questions and clarification was provided.       Patient transported with:   O2 @ 2 liters  Registered Nurse

## 2019-08-15 PROCEDURE — 94760 N-INVAS EAR/PLS OXIMETRY 1: CPT

## 2019-08-15 PROCEDURE — 65270000029 HC RM PRIVATE

## 2019-08-15 PROCEDURE — 74011250637 HC RX REV CODE- 250/637: Performed by: SURGERY

## 2019-08-15 PROCEDURE — 74011000258 HC RX REV CODE- 258: Performed by: SURGERY

## 2019-08-15 PROCEDURE — 77010033678 HC OXYGEN DAILY

## 2019-08-15 PROCEDURE — 74011250636 HC RX REV CODE- 250/636: Performed by: SURGERY

## 2019-08-15 RX ADMIN — CEFAZOLIN 1 G: 1 INJECTION, POWDER, FOR SOLUTION INTRAMUSCULAR; INTRAVENOUS at 11:17

## 2019-08-15 RX ADMIN — CEFAZOLIN 1 G: 1 INJECTION, POWDER, FOR SOLUTION INTRAMUSCULAR; INTRAVENOUS at 21:52

## 2019-08-15 RX ADMIN — ENOXAPARIN SODIUM 30 MG: 30 INJECTION, SOLUTION INTRAVENOUS; SUBCUTANEOUS at 06:00

## 2019-08-15 RX ADMIN — GUANFACINE HYDROCHLORIDE 2 MG: 1 TABLET ORAL at 21:52

## 2019-08-15 RX ADMIN — CLOPIDOGREL BISULFATE 75 MG: 75 TABLET ORAL at 09:54

## 2019-08-15 RX ADMIN — ASPIRIN 81 MG: 81 TABLET, COATED ORAL at 09:54

## 2019-08-15 RX ADMIN — LABETALOL HYDROCHLORIDE 300 MG: 200 TABLET, FILM COATED ORAL at 19:55

## 2019-08-15 RX ADMIN — OXYCODONE HYDROCHLORIDE 5 MG: 5 TABLET ORAL at 21:52

## 2019-08-15 RX ADMIN — CEFAZOLIN 1 G: 1 INJECTION, POWDER, FOR SOLUTION INTRAMUSCULAR; INTRAVENOUS at 04:45

## 2019-08-15 RX ADMIN — ATORVASTATIN CALCIUM 40 MG: 40 TABLET, FILM COATED ORAL at 21:52

## 2019-08-15 RX ADMIN — FLUOXETINE 40 MG: 20 CAPSULE ORAL at 09:54

## 2019-08-15 NOTE — PROGRESS NOTES
General Surgery End of Shift Nursing Note     Bedside shift change report given to Cesilia Olmedo RN (oncoming nurse) by Catalino Nicolas RN (offgoing nurse). Report included the following information SBAR, Kardex, Intake/Output and MAR.     Shift worked:   7p   Summary of shift:  Patient noted resting, no distress noted at this time. Wound Vac in place, no complications. Pain management goals met.     Issues for physician to address: None         Pain Management:  Current medication: Oxycodone  Patient states pain is manageable on current pain medication: YES     Ignacia Aquino

## 2019-08-15 NOTE — OP NOTES
Critical access hospital  OPERATIVE REPORT    Name:  Gee Elizabeth  MR#:  [id number]  :  1950  ACCOUNT #:  [de-identified]  DATE OF SERVICE:  2019      PREOPERATIVE DIAGNOSIS:  Peripheral vascular disease with lymphocele in the right groin. POSTOPERATIVE DIAGNOSIS:  Peripheral vascular disease with lymphocele in the right groin. PROCEDURE PERFORMED:  1. Exploration of lymphocele. 2.  Injection of doxycycline. 3.  Injection of Tisseel. 4.  Secondary closure. SURGEON:  Kimime Wyatt MD.    Yohan Devlin. ANESTHESIA:  gen. COMPLICATIONS:  None. SPECIMENS REMOVED:  None. IMPLANTS:  Tisseel and Surgicel gauze. ESTIMATED BLOOD LOSS:  Minimal.    PROCEDURE:  With the patient supine on the operating table, general anesthesia was induced and the right groin was prepared as a sterile field. Initially, the DANA drain was transected and saline injected through the drain into the wound. Unfortunately, it leaked out through the groin incision. that have not sealed. The skin sutures were removed from that and the DANA drain removed. Using the drain site as well as opening into the base of the wound from the groin incision, it was irrigated with saline. Subsequently, the wound was filled with doxycycline to act as a sclerosing agent. Following this, Tisseel was injected into the base of the wound from the groin wound and through the drain track. The drain track was closed with figure-of-eight suture of 0 Vicryl. A Surgicel gauze was placed in the base of the groin wound. Additional subcutaneous closure was made with the running 2-0 Vicryl. The skin was closed with running locked 2-0 nylon. A wound VAC was applied as a dressing. The patient returned to recovery in guarded condition.       Ninfa May MD      [id number]  D:  2019 13:26  T:  2019 15:20  JOB #:  [id number]  CC:  Ross Alegre MD

## 2019-08-15 NOTE — WOUND CARE
Wound VAC to closed incision: patient is POD#2 from return trip to OR with Dr Kiana Mitchell for right groin exploration and secondary closure. No drainage in cannister of wound VAC. Patient reports NO issue with wound VAC. Patient's insurance will NOT approve of traditional wound VAC therapy for home use due to the fact that the incision is closed. Incisional NPWT/Wound VAC's available that last 5-7 days on a wound. Vascular NP called and VM left. Option to place Prevena closed incision NPWT/wound VAC dressing on for discharge or to do daily dressing changes.     Plan: Await further instructions from Vascular team.    Florance Opitz, RN, CWON ext#[phone]

## 2019-08-16 ENCOUNTER — TELEPHONE (OUTPATIENT)
Dept: NEUROLOGY | Age: 69
End: 2019-08-16

## 2019-08-16 VITALS
DIASTOLIC BLOOD PRESSURE: 51 MMHG | RESPIRATION RATE: 16 BRPM | BODY MASS INDEX: 31.51 KG/M2 | SYSTOLIC BLOOD PRESSURE: 97 MMHG | OXYGEN SATURATION: 98 % | TEMPERATURE: 97.8 F | HEIGHT: 61 IN | HEART RATE: 64 BPM | WEIGHT: 166.89 LBS

## 2019-08-16 LAB
ANION GAP SERPL CALC-SCNC: 8 MMOL/L (ref 5–15)
BUN SERPL-MCNC: 14 MG/DL (ref 6–20)
BUN/CREAT SERPL: 12 (ref 12–20)
CALCIUM SERPL-MCNC: 8.5 MG/DL (ref 8.5–10.1)
CHLORIDE SERPL-SCNC: 110 MMOL/L (ref 97–108)
CO2 SERPL-SCNC: 25 MMOL/L (ref 21–32)
CREAT SERPL-MCNC: 1.13 MG/DL (ref 0.55–1.02)
ERYTHROCYTE [DISTWIDTH] IN BLOOD BY AUTOMATED COUNT: 13.5 % (ref 11.5–14.5)
GLUCOSE SERPL-MCNC: 109 MG/DL (ref 65–100)
HCT VFR BLD AUTO: 33.4 % (ref 35–47)
HGB BLD-MCNC: 10.4 G/DL (ref 11.5–16)
MCH RBC QN AUTO: 28.1 PG (ref 26–34)
MCHC RBC AUTO-ENTMCNC: 31.1 G/DL (ref 30–36.5)
MCV RBC AUTO: 90.3 FL (ref 80–99)
NRBC # BLD: 0 K/UL (ref 0–0.01)
NRBC BLD-RTO: 0 PER 100 WBC
PLATELET # BLD AUTO: 321 K/UL (ref 150–400)
PMV BLD AUTO: 10.3 FL (ref 8.9–12.9)
POTASSIUM SERPL-SCNC: 3.9 MMOL/L (ref 3.5–5.1)
RBC # BLD AUTO: 3.7 M/UL (ref 3.8–5.2)
SODIUM SERPL-SCNC: 143 MMOL/L (ref 136–145)
WBC # BLD AUTO: 12.1 K/UL (ref 3.6–11)

## 2019-08-16 PROCEDURE — 85027 COMPLETE CBC AUTOMATED: CPT

## 2019-08-16 PROCEDURE — 94760 N-INVAS EAR/PLS OXIMETRY 1: CPT

## 2019-08-16 PROCEDURE — 80048 BASIC METABOLIC PNL TOTAL CA: CPT

## 2019-08-16 PROCEDURE — 74011250637 HC RX REV CODE- 250/637: Performed by: SURGERY

## 2019-08-16 PROCEDURE — 36415 COLL VENOUS BLD VENIPUNCTURE: CPT

## 2019-08-16 PROCEDURE — 74011250636 HC RX REV CODE- 250/636: Performed by: SURGERY

## 2019-08-16 PROCEDURE — 74011000258 HC RX REV CODE- 258: Performed by: SURGERY

## 2019-08-16 RX ORDER — HYDROCODONE BITARTRATE AND ACETAMINOPHEN 5; 325 MG/1; MG/1
1 TABLET ORAL
Qty: 25 TAB | Refills: 0 | Status: SHIPPED | OUTPATIENT
Start: 2019-08-16 | End: 2019-08-30

## 2019-08-16 RX ORDER — SULFAMETHOXAZOLE AND TRIMETHOPRIM 800; 160 MG/1; MG/1
1 TABLET ORAL DAILY
Qty: 7 TAB | Refills: 0 | Status: SHIPPED | OUTPATIENT
Start: 2019-08-16 | End: 2019-10-31 | Stop reason: ALTCHOICE

## 2019-08-16 RX ADMIN — MELATONIN 3 MG: at 01:03

## 2019-08-16 RX ADMIN — CLOPIDOGREL BISULFATE 75 MG: 75 TABLET ORAL at 09:33

## 2019-08-16 RX ADMIN — FLUOXETINE 40 MG: 20 CAPSULE ORAL at 09:33

## 2019-08-16 RX ADMIN — AMLODIPINE BESYLATE 10 MG: 5 TABLET ORAL at 09:33

## 2019-08-16 RX ADMIN — ASPIRIN 81 MG: 81 TABLET, COATED ORAL at 09:33

## 2019-08-16 RX ADMIN — CEFAZOLIN 1 G: 1 INJECTION, POWDER, FOR SOLUTION INTRAMUSCULAR; INTRAVENOUS at 04:04

## 2019-08-16 RX ADMIN — ENOXAPARIN SODIUM 30 MG: 30 INJECTION, SOLUTION INTRAVENOUS; SUBCUTANEOUS at 05:49

## 2019-08-16 RX ADMIN — LABETALOL HYDROCHLORIDE 300 MG: 200 TABLET, FILM COATED ORAL at 09:33

## 2019-08-16 NOTE — PROGRESS NOTES
Pt alert and oriented x4. No c/o pain noted this shift. IV clean dry intact. Wound vac remain on r groin at 125; no output noted. Up ambulating in room multiple time this shift. tolerated meals and medication No other issues noted at this time

## 2019-08-16 NOTE — PROGRESS NOTES
Pt discharged per MD order. Pt has all personal belongings, 2 prescriptions, and discharge instructions. 1 IV removed. Discharge instructions gone over with pt. Pt does not have any further questions regarding discharge.

## 2019-08-16 NOTE — DISCHARGE INSTRUCTIONS
Keep a light dressing in the groin to keep dry. Apply antibiotic cream to the wound after showe. You may shower. See Dr. Merline Jackson in 10 days. Call for any problems. [phone].

## 2019-08-16 NOTE — TELEPHONE ENCOUNTER
----- Message from [employer] sent at 8/16/2019 12:31 PM EDT -----  Regarding: Dr. Enma Brand/Telephone   Bandar Desai from [facility]  is requesting to schedule a hospital follow-up regarding pt's altered mental status and memory loss, pt was discharged today on 8/16/19 and saw Dr. Emely Gandara.  Pt's [address] Number: [phone]/ Cell: [phone]

## 2019-08-16 NOTE — DISCHARGE SUMMARY
Physician Discharge Summary     Patient ID:  Randolph Gil  [id number]  71 y.o.  1950    Allergies: Tylenol [acetaminophen] and Codeine    Admit Date: 8/5/2019    Discharge Date: 8/16/2019    * Admission Diagnoses: Lymphocele after surgical procedure [I89.8]  Lymphocele after surgical procedure [I89.8]  Lymphocele after surgical procedure [I89.8]    * Discharge Diagnoses:    Hospital Problems as of 8/16/2019 Date Reviewed: 8/14/2019          Codes Class Noted - Resolved POA    Altered mental status, unspecified ICD-10-CM: R41.82  ICD-9-CM: 780.97  8/7/2019 - Present Yes        Bilateral carotid artery stenosis ICD-10-CM: I65.23  ICD-9-CM: 433.10, 433.30  8/7/2019 - Present Yes        History of cerebral thrombosis ICD-10-CM: Z86.73  ICD-9-CM: V12.54  8/7/2019 - Present Yes        Disturbance of memory ICD-10-CM: R41.3  ICD-9-CM: 780.93  8/7/2019 - Present Yes        Convulsion ([address] ICD-10-CM: R56.9  ICD-9-CM: 780.39  8/7/2019 - Present Yes        Lymphocele after surgical procedure ICD-10-CM: I89.8  ICD-9-CM: 457.8  8/5/2019 - Present Unknown        Stenosis of right carotid artery ICD-10-CM: I65.21  ICD-9-CM: 433.10  11/7/2018 - Present Yes               Admission Condition: Fair    * Discharge Condition: improved    * Procedures: Procedure(s):   CLOSURE RIGHT GROIN LYMPHOCELE WITH INJECTION OF DOXCYCLINE INTO RIGHT GROIN LYMPHOCELE  AND [facility]    * Hospital Course:   Normal hospital course for this procedure. Consults: None and Neurology    Significant Diagnostic Studies:     * Disposition: Home    Discharge Medications:   Current Discharge Medication List          * Follow-up Care/Patient Instructions:   Activity: Activity as tolerated  Diet: Cardiac Diet  Wound Care: As directed    Follow-up Information     Follow up With Specialties Details Why Contact Info    Art Rosario MD [facility]   [address]  [place] [id number] [phone]      AT [id number] [place] [address] [facility] Services  This is your home health care provider.   If you dont hear from them within 48hrs of discharge, please give them a call [facility]    Miri Chang MD Neurology In 4 weeks  [address]  [facility]  [phone]          Follow-up tests/labs     Signed:  Jean Claude Nava MD  8/16/2019  10:32 AM

## 2019-08-16 NOTE — PROGRESS NOTES
General Surgery End of Shift Nursing Note    Bedside shift change report given to Gualberto Thurston RN (oncoming nurse) by Wilman Dang RN (offgoing nurse). Report included the following information SBAR, Kardex, Intake/Output and MAR. Shift worked:   7p   Summary of shift:  Patient noted resting, no distress noted at this time. Pt. Am labs with increase of WBC. Wound vac in place, continuous suction.     Issues for physician to address: WBC     Number times ambulated in hallway past shift: 0    Number of times OOB to chair past shift: 0  Ambulates to bathroom    Pain Management:  Current medication: Oxycodone  Patient states pain is manageable on current pain medication: YES    GI:    Current diet:  DIET CARDIAC Regular    Tolerating current diet: YES  Passing flatus: YES      Respiratory:    Incentive Spirometer at bedside: YES  Patient instructed on use: YES      Ignacia Aquino

## 2019-08-16 NOTE — PROGRESS NOTES
ANA Plan:                 *Home [address]    Patient is being discharged home today with [facility], provided by [facility]. CM informed HH via [employer] of d/c. Patient did not d/c with a wound vac.  transported pt home without any other needs or concerns. Follow-up appointment is on the AVS.  Patient was discharged and left the building before CM was able to provide pt with IM letter. Care Management Interventions  PCP Verified by CM: Yes(Dr Tommy Kaufman)  Mode of Transport at Discharge:  Other (see comment)()  Transition of Care Consult (CM Consult): [address]: No  Reason Outside [place]: Out of service area  Discharge Durable Medical Equipment: No(No DME use)  Physical Therapy Consult: No  Occupational Therapy Consult: No  Speech Therapy Consult: No  Current Support Network: Lives with Spouse, Own Home(Lives in a two story home with bedroom on first floor         )  Confirm Follow Up Transport: Self  Plan discussed with Pt/Family/Caregiver: Yes( assisted with info verification)  Freedom of Choice Offered: Yes  Discharge Location  Discharge Placement: Home with home health      Rajeev Conley  Ext [phone]

## 2019-08-19 NOTE — TELEPHONE ENCOUNTER
I called and they want to think about doing a follow up and will call back if they decide to follow up in the office

## 2020-01-09 ENCOUNTER — OFFICE VISIT (OUTPATIENT)
Dept: SURGERY | Age: 70
End: 2020-01-09

## 2020-01-09 VITALS
BODY MASS INDEX: 35.06 KG/M2 | SYSTOLIC BLOOD PRESSURE: 117 MMHG | TEMPERATURE: 97.6 F | RESPIRATION RATE: 18 BRPM | HEART RATE: 65 BPM | WEIGHT: 173.9 LBS | DIASTOLIC BLOOD PRESSURE: 61 MMHG | OXYGEN SATURATION: 98 % | HEIGHT: 59 IN

## 2020-01-09 DIAGNOSIS — Z86.010 HX OF COLONIC POLYPS: ICD-10-CM

## 2020-01-09 DIAGNOSIS — Z83.71 FAMILY HX COLONIC POLYPS: Primary | ICD-10-CM

## 2020-01-09 RX ORDER — BISACODYL 5 MG
10 TABLET, DELAYED RELEASE (ENTERIC COATED) ORAL ONCE
Qty: 2 TAB | Refills: 0 | Status: SHIPPED | OUTPATIENT
Start: 2020-01-09 | End: 2020-01-09

## 2020-01-09 RX ORDER — SODIUM CHLORIDE, SODIUM LACTATE, POTASSIUM CHLORIDE, CALCIUM CHLORIDE 600; 310; 30; 20 MG/100ML; MG/100ML; MG/100ML; MG/100ML
200 INJECTION, SOLUTION INTRAVENOUS CONTINUOUS
Status: CANCELLED | OUTPATIENT
Start: 2020-01-09 | End: 2020-01-10

## 2020-01-09 RX ORDER — POLYETHYLENE GLYCOL 3350, SODIUM CHLORIDE, SODIUM BICARBONATE, POTASSIUM CHLORIDE 420; 11.2; 5.72; 1.48 G/4L; G/4L; G/4L; G/4L
4000 POWDER, FOR SOLUTION ORAL
Qty: 1 BOTTLE | Refills: 0 | Status: SHIPPED | OUTPATIENT
Start: 2020-01-09 | End: 2020-01-09

## 2020-01-09 NOTE — PROGRESS NOTES
1. Have you been to the ER, urgent care clinic since your last visit? Hospitalized since your last visit? No    2. Have you seen or consulted any other health care providers outside of the [address] [place] [address] since your last visit? Include any pap smears or colon screening.  No

## 2020-01-09 NOTE — PROGRESS NOTES
[address] [facility], [facility]  Phone: [phone] Fax: [phone]                                           HISTORY AND PHYSICAL EXAM    NAME: Allegra Whiting  : 1950    Chief Complaint:   History of colon polyps    History: Janeth Hinojosa is a 71 y.o. WHITE OR  female referred for colonoscopy. This is  the patient's second colonoscopy. The previous one had polyps. The last colonoscopy was 6-10 years ago. The bowel movements are regular but sometimes dry and hard. She does not use any meds on a regular basis for her bowels. She denies any blood in stools or hemorrhoidal disease. Family history is negative for colon cancer but mother had colon polyps. Past Medical History:   Diagnosis Date    Adverse effect of anesthesia     sleep apnea does not use cpap machine      Arthritis     Spine, DDD    CAD (coronary artery disease) 8 yr ago    Carotid stent on right, Left is 100% Occluded, sees Dr. Padmini Nowak, PVD    Colon polyps     Depression     Hematuria     High cholesterol     Hypertension     Osteopenia     Psychiatric disorder     PVD (peripheral vascular disease) with claudication ([address]     Renal insufficiency     Sleep apnea     Dx and treated with surgery without improvement, does not use CPAP    Stroke ([address] 2004    right leg weakness, right arm paralysis (treated in [place])     Past Surgical History:   Procedure Laterality Date    HX COLONOSCOPY      HX GYN      BTL    HX HEENT      Tonsilectomy    VASCULAR SURGERY PROCEDURE UNLIST Right     Carotid stent, Dr. Padmini Nowak, left Carotid 100% blocked    VASCULAR SURGERY PROCEDURE UNLIST Right 2019    vascular leg/groin surgery        Current Outpatient Medications   Medication Sig Dispense Refill    bisacodyl (DULCOLAX, BISACODYL,) 5 mg EC tablet Take 2 Tabs by mouth once for 1 dose. 2 Tab 0    peg-electrolyte soln (GAVILYTE-N) 420 gram solution Take 4,000 mL by mouth now for 1 dose. Indications: emptying of the bowel 1 Bottle 0    cloNIDine HCl (CATAPRES) 0.1 mg tablet Take 0.1 mg by mouth nightly.  loratadine (CLARITIN) 10 mg tablet Take 10 mg by mouth.  HYDROcodone-acetaminophen (NORCO) 5-325 mg per tablet Take  by mouth.  amLODIPine (NORVASC) 10 mg tablet Take 10 mg by mouth daily.  atorvastatin (LIPITOR) 40 mg tablet Take 40 mg by mouth nightly.  clopidogrel (PLAVIX) 75 mg tab       guanFACINE IR (TENEX) 2 mg IR tablet Take 2 mg by mouth nightly.  labetalol (NORMODYNE) 300 mg tablet Take 300 mg by mouth two (2) times a day.  calcium-cholecalciferol, d3, (CALCIUM 600 + D) 600-125 mg-unit tab Take  by mouth.  aspirin delayed-release 81 mg tablet Take  by mouth daily.  fluoxetine (PROZAC) 40 mg capsule Take 40 mg by mouth daily. Allergies   Allergen Reactions    Tylenol [Acetaminophen] Unknown (comments)     States cannot take this has problems cannot remember reaction       Ambien [Zolpidem] Other (comments)     Sleep walking    Lunesta [Eszopiclone] Other (comments)     crazy    Codeine Nausea Only     Social History     Socioeconomic History    Marital status:      Spouse name: Not on file    Number of children: Not on file    Years of education: Not on file    Highest education level: Not on file   Occupational History    Not on file   Social Needs    Financial resource strain: Not on file    Food insecurity:     Worry: Not on file     Inability: Not on file    Transportation needs:     Medical: Not on file     Non-medical: Not on file   Tobacco Use    Smoking status: Former Smoker     Packs/day: 2.00     Years: 35.00     Pack years: 70.00     Last attempt to quit: 2003     Years since quittin.5    Smokeless tobacco: Never Used   Substance and Sexual Activity    Alcohol use:  Yes     Alcohol/week: 2.0 standard drinks     Types: 2 Cans of beer per week     Comment: last drink one month ago per pt and spouse on 19    Drug use: Yes     Types: Prescription, OTC    Sexual activity: Not Currently   Lifestyle    Physical activity:     Days per week: Not on file     Minutes per session: Not on file    Stress: Not on file   Relationships    Social connections:     Talks on phone: Not on file     Gets together: Not on file     Attends Sabianism service: Not on file     Active member of club or organization: Not on file     Attends meetings of clubs or organizations: Not on file     Relationship status: Not on file    Intimate partner violence:     Fear of current or ex partner: Not on file     Emotionally abused: Not on file     Physically abused: Not on file     Forced sexual activity: Not on file   Other Topics Concern     Service Not Asked    Blood Transfusions Not Asked    Caffeine Concern Not Asked    Occupational Exposure Not Asked   June [place] Hazards Not Asked    Sleep Concern Not Asked    Stress Concern Not Asked    Weight Concern Not Asked    Special Diet Not Asked    Back Care Not Asked    Exercise Not Asked    Bike Helmet Not Asked    [address] Not Asked    Self-Exams Not Asked   Social History Narrative    ** Merged History Encounter **          Family History   Problem Relation Age of Onset    Heart Disease Mother         unsure specifics, just knows of stroke    Other Mother         PAD    COPD Mother     Cancer Maternal Grandmother     COPD Father     Other Father          GSW    Other Sister         PAD    COPD Sister     Cancer Sister         colon at age 45   [place] No Known Problems Child        Patient Active Problem List   Diagnosis Code    Stenosis of right carotid artery I65.21    History of CVA (cerebrovascular accident) Z80.78    Hyperlipidemia E78.5    Essential hypertension I10    Femoral artery occlusion, 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 HENT: Positive for congestion, hearing loss, nosebleeds, sinus pain and sore throat. Negative for ear discharge, ear pain and tinnitus. Eyes: Positive for photophobia. Negative for blurred vision, double vision, pain, discharge and redness. Respiratory: Positive for cough and shortness of breath. Negative for sputum production and wheezing. Cardiovascular: Positive for claudication and leg swelling. Negative for chest pain and palpitations. Gastrointestinal: Positive for constipation. Negative for abdominal pain, blood in stool, diarrhea, heartburn, melena, nausea and vomiting. Genitourinary: Positive for frequency and urgency. Negative for hematuria. Musculoskeletal: Positive for back pain and neck pain. Negative for joint pain. Skin: Positive for itching. Negative for rash. Neurological: Positive for speech change and headaches. Negative for dizziness, tremors, focal weakness, seizures and weakness. Endo/Heme/Allergies: Positive for environmental allergies and polydipsia. Bruises/bleeds easily. Psychiatric/Behavioral: Positive for depression and memory loss. The patient is nervous/anxious and has insomnia. Physical Exam:  Visit Vitals  /61 (BP 1 Location: Left arm, BP Patient Position: Sitting)   Pulse 65   Temp 97.6 °F (36.4 °C) (Oral)   Resp 18   Ht 4' 11\" (1.499 m)   Wt 173 lb 14.4 oz (78.9 kg)   SpO2 98%   BMI 35.12 kg/m²       Physical Exam  Constitutional:       Appearance: She is well-developed. HENT:      Head: Normocephalic and atraumatic.       Right Ear: External ear normal.      Left Ear: External ear normal.      Nose: Nose normal.      Mouth/Throat:      Pharynx: No oropharyngeal exudate. Eyes:      General: No scleral icterus. Right eye: No discharge. Left eye: No discharge. Conjunctiva/sclera: Conjunctivae normal.      Pupils: Pupils are equal, round, and reactive to light. Neck:      Musculoskeletal: Neck supple. Thyroid: No thyromegaly. Trachea: No tracheal deviation. Cardiovascular:      Rate and Rhythm: Normal rate and regular rhythm. Heart sounds: Normal heart sounds. No murmur. No friction rub. No gallop. Pulmonary:      Effort: Pulmonary effort is normal. No respiratory distress. Breath sounds: Normal breath sounds. No wheezing or rales. Abdominal:      General: Bowel sounds are normal. There is no distension. Palpations: Abdomen is soft. There is no mass. Tenderness: There is no tenderness. Musculoskeletal: Normal range of motion. Lymphadenopathy:      Cervical: No cervical adenopathy. Skin:     General: Skin is warm and dry. Findings: No erythema or rash. Neurological:      Mental Status: She is alert and oriented to person, place, and time. Psychiatric:         Behavior: Behavior normal.         Thought Content: Thought content normal.         Judgment: Judgment normal.           Impression:  Personal and family history of colon polyps    Plan:  Colonoscopy on 8/28/81  Risks and complications, including perforation and bleeding, were explained to patient and they voiced understanding.     Shahnaz Waterman M.D.

## 2020-01-22 PROBLEM — K63.5 COLON POLYP: Status: ACTIVE | Noted: 2020-01-22

## 2020-01-22 PROBLEM — Z86.010 HX OF COLONIC POLYPS: Status: ACTIVE | Noted: 2020-01-22

## 2020-01-22 PROBLEM — K57.30 DIVERTICULA OF COLON: Status: ACTIVE | Noted: 2020-01-22

## 2020-01-22 LAB — COLONOSCOPY, EXTERNAL: NORMAL

## 2020-01-30 ENCOUNTER — OFFICE VISIT (OUTPATIENT)
Dept: SURGERY | Age: 70
End: 2020-01-30

## 2020-01-30 VITALS
OXYGEN SATURATION: 99 % | BODY MASS INDEX: 35.38 KG/M2 | SYSTOLIC BLOOD PRESSURE: 137 MMHG | RESPIRATION RATE: 18 BRPM | HEIGHT: 59 IN | WEIGHT: 175.5 LBS | HEART RATE: 70 BPM | DIASTOLIC BLOOD PRESSURE: 54 MMHG

## 2020-01-30 DIAGNOSIS — K57.30 DIVERTICULA OF COLON: Primary | ICD-10-CM

## 2020-01-30 DIAGNOSIS — K62.1 HYPERPLASTIC RECTAL POLYP: ICD-10-CM

## 2020-01-30 DIAGNOSIS — D12.2 ADENOMATOUS POLYP OF ASCENDING COLON: ICD-10-CM

## 2020-01-30 NOTE — PROGRESS NOTES
1. Have you been to the ER, urgent care clinic since your last visit? Hospitalized since your last visit? No    2. Have you seen or consulted any other health care providers outside of the [address] [place] [address] since your last visit? Include any pap smears or colon screening.  No

## 2020-01-31 NOTE — PROGRESS NOTES
Follow up from colonoscopy  No c/o      Pathology:  Adenomatous polyp x 1  Hyperplastic polyps  divericula    Visit Vitals  /54 (BP 1 Location: Right arm, BP Patient Position: Sitting)   Pulse 70   Resp 18   Ht 4' 11\" (1.499 m)   Wt 175 lb 8 oz (79.6 kg)   SpO2 99%   BMI 35.45 kg/m²       BP lower on the left than the right    WDWN patient in no acute distress. Pathophysiology and malignancy potential discussed. Pictures viewed and questions answered. High fiber diet suggested. Advised her to tell her cardiologist about the BP difference    Time spent 15 minutes in discussion, over half the visit was in care coordination and patient counseling. Repeat scope in 5 years.

## 2020-12-04 NOTE — PROGRESS NOTES
Out ambulating in hallway with  before vital signs could be taken. Comment: FBSE. AK tx w/ LN2 x2. Female Pattern Alopecia - Rx Spironolactone 100mg QD. Detail Level: Simple

## 2021-02-08 ENCOUNTER — HOSPITAL ENCOUNTER (OUTPATIENT)
Dept: PREADMISSION TESTING | Age: 71
Discharge: HOME | End: 2021-02-08
Attending: SURGERY
Payer: MEDICARE

## 2021-02-08 VITALS
DIASTOLIC BLOOD PRESSURE: 40 MMHG | HEIGHT: 60 IN | WEIGHT: 178.79 LBS | HEART RATE: 73 BPM | OXYGEN SATURATION: 96 % | TEMPERATURE: 97.8 F | BODY MASS INDEX: 35.1 KG/M2 | SYSTOLIC BLOOD PRESSURE: 85 MMHG | RESPIRATION RATE: 20 BRPM

## 2021-02-08 LAB
ABO + RH BLD: NORMAL
ALBUMIN SERPL-MCNC: 3.6 G/DL (ref 3.5–5)
ALBUMIN/GLOB SERPL: 1 {RATIO} (ref 1.1–2.2)
ALP SERPL-CCNC: 90 U/L (ref 45–117)
ALT SERPL-CCNC: 20 U/L (ref 12–78)
ANION GAP SERPL CALC-SCNC: 7 MMOL/L (ref 5–15)
APPEARANCE UR: CLEAR
APTT PPP: 27.4 SEC (ref 22.1–31)
AST SERPL-CCNC: 10 U/L (ref 15–37)
BACTERIA URNS QL MICRO: NEGATIVE /HPF
BILIRUB SERPL-MCNC: 0.4 MG/DL (ref 0.2–1)
BILIRUB UR QL: NEGATIVE
BLOOD GROUP ANTIBODIES SERPL: NORMAL
BUN SERPL-MCNC: 12 MG/DL (ref 6–20)
BUN/CREAT SERPL: 10 (ref 12–20)
CALCIUM SERPL-MCNC: 9.1 MG/DL (ref 8.5–10.1)
CHLORIDE SERPL-SCNC: 107 MMOL/L (ref 97–108)
CO2 SERPL-SCNC: 24 MMOL/L (ref 21–32)
COLOR UR: ABNORMAL
CREAT SERPL-MCNC: 1.22 MG/DL (ref 0.55–1.02)
EPITH CASTS URNS QL MICRO: ABNORMAL /LPF
ERYTHROCYTE [DISTWIDTH] IN BLOOD BY AUTOMATED COUNT: 13.8 % (ref 11.5–14.5)
GLOBULIN SER CALC-MCNC: 3.6 G/DL (ref 2–4)
GLUCOSE SERPL-MCNC: 105 MG/DL (ref 65–100)
GLUCOSE UR STRIP.AUTO-MCNC: NEGATIVE MG/DL
HCT VFR BLD AUTO: 37.9 % (ref 35–47)
HGB BLD-MCNC: 12.1 G/DL (ref 11.5–16)
HGB UR QL STRIP: NEGATIVE
INR PPP: 1 (ref 0.9–1.1)
KETONES UR QL STRIP.AUTO: NEGATIVE MG/DL
LEUKOCYTE ESTERASE UR QL STRIP.AUTO: ABNORMAL
MCH RBC QN AUTO: 28.6 PG (ref 26–34)
MCHC RBC AUTO-ENTMCNC: 31.9 G/DL (ref 30–36.5)
MCV RBC AUTO: 89.6 FL (ref 80–99)
MUCOUS THREADS URNS QL MICRO: ABNORMAL /LPF
NITRITE UR QL STRIP.AUTO: NEGATIVE
NRBC # BLD: 0 K/UL (ref 0–0.01)
NRBC BLD-RTO: 0 PER 100 WBC
PH UR STRIP: 6 [PH] (ref 5–8)
PLATELET # BLD AUTO: 308 K/UL (ref 150–400)
PMV BLD AUTO: 10.4 FL (ref 8.9–12.9)
POTASSIUM SERPL-SCNC: 3.9 MMOL/L (ref 3.5–5.1)
PROT SERPL-MCNC: 7.2 G/DL (ref 6.4–8.2)
PROT UR STRIP-MCNC: ABNORMAL MG/DL
PROTHROMBIN TIME: 10.8 SEC (ref 9–11.1)
RBC # BLD AUTO: 4.23 M/UL (ref 3.8–5.2)
RBC #/AREA URNS HPF: ABNORMAL /HPF (ref 0–5)
SARS-COV-2, COV2: NORMAL
SODIUM SERPL-SCNC: 138 MMOL/L (ref 136–145)
SP GR UR REFRACTOMETRY: 1.02 (ref 1–1.03)
SPECIMEN EXP DATE BLD: NORMAL
THERAPEUTIC RANGE,PTTT: NORMAL SECS (ref 58–77)
UA: UC IF INDICATED,UAUC: ABNORMAL
UROBILINOGEN UR QL STRIP.AUTO: 0.2 EU/DL (ref 0.2–1)
WBC # BLD AUTO: 7.3 K/UL (ref 3.6–11)
WBC URNS QL MICRO: ABNORMAL /HPF (ref 0–4)

## 2021-02-08 PROCEDURE — 85610 PROTHROMBIN TIME: CPT

## 2021-02-08 PROCEDURE — 81001 URINALYSIS AUTO W/SCOPE: CPT

## 2021-02-08 PROCEDURE — 86901 BLOOD TYPING SEROLOGIC RH(D): CPT

## 2021-02-08 PROCEDURE — 36415 COLL VENOUS BLD VENIPUNCTURE: CPT

## 2021-02-08 PROCEDURE — 93005 ELECTROCARDIOGRAM TRACING: CPT

## 2021-02-08 PROCEDURE — U0003 INFECTIOUS AGENT DETECTION BY NUCLEIC ACID (DNA OR RNA); SEVERE ACUTE RESPIRATORY SYNDROME CORONAVIRUS 2 (SARS-COV-2) (CORONAVIRUS DISEASE [COVID-19]), AMPLIFIED PROBE TECHNIQUE, MAKING USE OF HIGH THROUGHPUT TECHNOLOGIES AS DESCRIBED BY CMS-2020-01-R: HCPCS

## 2021-02-08 PROCEDURE — 85027 COMPLETE CBC AUTOMATED: CPT

## 2021-02-08 PROCEDURE — 80053 COMPREHEN METABOLIC PANEL: CPT

## 2021-02-08 PROCEDURE — 85730 THROMBOPLASTIN TIME PARTIAL: CPT

## 2021-02-08 RX ORDER — SODIUM CHLORIDE, SODIUM LACTATE, POTASSIUM CHLORIDE, CALCIUM CHLORIDE 600; 310; 30; 20 MG/100ML; MG/100ML; MG/100ML; MG/100ML
25 INJECTION, SOLUTION INTRAVENOUS CONTINUOUS
Status: CANCELLED | OUTPATIENT
Start: 2021-02-12

## 2021-02-08 NOTE — PERIOP NOTES
Incentive Spirometer        Using the incentive spirometer helps expand the small air sacs of your lungs, helps you breathe deeply, and helps improve your lung function. Use your incentive spirometer twice a day (10 breaths each time) prior to surgery. How to Use Your Incentive Spirometer:  1. Hold the incentive spirometer in an upright position. 2. Breathe out as usual.   3. Place the mouthpiece in your mouth and seal your lips tightly around it. 4. Take a deep breath. Breathe in slowly and as deeply as possible. Keep the blue flow rate guide between the arrows. 5. Hold your breath as long as possible. Then exhale slowly and allow the piston to fall to the bottom of the column. 6. Rest for a few seconds and repeat steps one through five at least 10 times. PAT Tidal Volume_____750_____________  x______2__________  Date____2/8/21_________    Beti Finders THE INCENTIVE SPIROMETER WITH YOU TO THE HOSPITAL ON THE DAY OF YOUR SURGERY. Opportunity given to ask and answer questions as well as to observe return demonstration.     Patient signature_____________________________          Witness____________________________

## 2021-02-08 NOTE — PERIOP NOTES
Per Bi Hayden, NP - Called to Dr. Eura Hammans office, sp/w Riri John. Notified patient BP low 95/53 LUE and higher 160/51 RUE, patient and spouse states this has been going on since last surgery in 2019 and not sure if surgeon is aware since saw Dr. Kalyan Rivera in past.  Patient c/o having times she \"feels weak like my legs are going to give out\". Should patient hold BP medications? Also notified Orthostats completed, results as follows:  Supine: BP: 87/57, HR: 64  Sitting:  BP: 77/55, HR: 63  Standing: BP: 75/46, HR: 75  Standing after 3 mins: BP: 95/62, HR: 73   Riri John states she will contact Dr. Eura Hammans and CB. Called to Patient and spouse, notified awaiting CB from MD, patient spouse states will take patient to [facility] in [address] [facility] if needed. Sp/w Dr. Eura Hammans, who states he is aware of patient BP and continue with current treatment.

## 2021-02-08 NOTE — PERIOP NOTES
[facility]  Preoperative Instructions    Surgery Date 2/12/21          Time of Arrival 0700  Contact # [phone] home    1. On the day of your surgery, please report to the Surgical Services Registration Desk and sign in at your designated time. The Surgery Center is located to the right of the Emergency Room. 2. You must have someone with you to drive you home. You should not drive a car for 24 hours following surgery. Please make arrangements for a friend or family member to stay with you for the first 24 hours after your surgery. 3. Do not have anything to eat or drink (including water, gum, mints, coffee, juice) after midnight 2/11/21. ? This may not apply to medications prescribed by your physician. ?(Please note below the special instructions with medications to take the morning of your procedure.)    4. We recommend you do not drink any alcoholic beverages for 24 hours before and after your surgery. 5. Contact your surgeons office for instructions on the following medications: non-steroidal anti-inflammatory drugs (i.e. Advil, Aleve), vitamins, and supplements. (Some surgeons will want you to stop these medications prior to surgery and others may allow you to take them)  **If you are currently taking Plavix, Coumadin, Aspirin and/or other blood-thinning agents, contact your surgeon for instructions. ** Your surgeon will partner with the physician prescribing these medications to determine if it is safe to stop or if you need to continue taking. Please do not stop taking these medications without instructions from your surgeon    6. Wear comfortable clothes. Wear glasses instead of contacts. Do not bring any money or jewelry. Please bring picture ID, insurance card, and any prearranged co-payment or hospital payment. Do not wear make-up, particularly mascara the morning of your surgery. Do not wear nail polish, particularly if you are having foot /hand surgery.   Wear your hair loose or down, no ponytails, buns, kim pins or clips. All body piercings must be removed. Please shower with antibacterial soap for three consecutive days before and on the morning of surgery, but do not apply any lotions, powders or deodorants after the shower on the day of surgery. Please use a fresh towels after each shower. Please sleep in clean clothes and change bed linens the night before surgery. Please do not shave for 48 hours prior to surgery. Shaving of the face is acceptable. 7. You should understand that if you do not follow these instructions your surgery may be cancelled. If your physical condition changes (I.e. fever, cold or flu) please contact your surgeon as soon as possible. 8. It is important that you be on time. If a situation occurs where you may be late, please call [phone] (OR Holding Area). 9. If you have any questions and or problems, please call [phone] (Pre-admission Testing). 10. Your surgery time may be subject to change. You will receive a phone call the evening prior if your time changes. 11.  If having outpatient surgery, you must have someone to drive you here, stay with you during the duration of your stay, and to drive you home at time of discharge. Special Instructions:   Patient advised to self-quarantine after covid test and up to day of surgery. TAKE ALL MEDICATIONS DAY OF SURGERY EXCEPT: None    I understand a pre-operative phone call will be made to verify my surgery time. In the event that I am not available, I give permission for a message to be left on my answering service and/or with another person?   yes         ___________________       __________  2/8/21 @ 0900    (Signature of Patient)             (Witness)                (Date and Time)

## 2021-02-09 LAB
ATRIAL RATE: 63 BPM
CALCULATED P AXIS, ECG09: 35 DEGREES
CALCULATED R AXIS, ECG10: 57 DEGREES
CALCULATED T AXIS, ECG11: 118 DEGREES
DIAGNOSIS, 93000: NORMAL
P-R INTERVAL, ECG05: 174 MS
Q-T INTERVAL, ECG07: 422 MS
QRS DURATION, ECG06: 78 MS
QTC CALCULATION (BEZET), ECG08: 431 MS
SARS-COV-2, COV2NT: NOT DETECTED
VENTRICULAR RATE, ECG03: 63 BPM

## 2021-02-09 NOTE — ADVANCED PRACTICE NURSE
EKG from 2/8/21 reviewed with Dr. Jorge Luis Ribera, anesthesiologist and compared with previous EKG from 7/11/19. Planned procedure, PMHx, functional status reviewed. Pt ok to proceed with planned procedure per Dr. Jorge Luis Ribera.

## 2021-02-12 ENCOUNTER — ANESTHESIA (OUTPATIENT)
Dept: SURGERY | Age: 71
DRG: 252 | End: 2021-02-12
Payer: MEDICARE

## 2021-02-12 ENCOUNTER — ANESTHESIA EVENT (OUTPATIENT)
Dept: SURGERY | Age: 71
DRG: 252 | End: 2021-02-12
Payer: MEDICARE

## 2021-02-12 ENCOUNTER — HOSPITAL ENCOUNTER (INPATIENT)
Age: 71
LOS: 4 days | Discharge: HOME | DRG: 252 | End: 2021-02-16
Attending: SURGERY | Admitting: SURGERY
Payer: MEDICARE

## 2021-02-12 DIAGNOSIS — I70.202 FEMORAL ARTERY OCCLUSION, LEFT (HCC): Primary | ICD-10-CM

## 2021-02-12 PROCEDURE — 77030031139 HC SUT VCRL2 J&J -A: Performed by: SURGERY

## 2021-02-12 PROCEDURE — 74011250636 HC RX REV CODE- 250/636: Performed by: SURGERY

## 2021-02-12 PROCEDURE — 04UJ07Z SUPPLEMENT LEFT EXTERNAL ILIAC ARTERY WITH AUTOLOGOUS TISSUE SUBSTITUTE, OPEN APPROACH: ICD-10-PCS | Performed by: SURGERY

## 2021-02-12 PROCEDURE — 76010000171 HC OR TIME 2 TO 2.5 HR INTENSV-TIER 1: Performed by: SURGERY

## 2021-02-12 PROCEDURE — 74011000250 HC RX REV CODE- 250: Performed by: NURSE ANESTHETIST, CERTIFIED REGISTERED

## 2021-02-12 PROCEDURE — 2709999900 HC NON-CHARGEABLE SUPPLY

## 2021-02-12 PROCEDURE — 65270000029 HC RM PRIVATE

## 2021-02-12 PROCEDURE — P9045 ALBUMIN (HUMAN), 5%, 250 ML: HCPCS | Performed by: NURSE ANESTHETIST, CERTIFIED REGISTERED

## 2021-02-12 PROCEDURE — 74011250636 HC RX REV CODE- 250/636: Performed by: NURSE ANESTHETIST, CERTIFIED REGISTERED

## 2021-02-12 PROCEDURE — 77030026438 HC STYL ET INTUB CARD -A: Performed by: ANESTHESIOLOGY

## 2021-02-12 PROCEDURE — 74011000250 HC RX REV CODE- 250: Performed by: SURGERY

## 2021-02-12 PROCEDURE — 77030002987 HC SUT PROL J&J -B: Performed by: SURGERY

## 2021-02-12 PROCEDURE — 77030040922 HC BLNKT HYPOTHRM STRY -A

## 2021-02-12 PROCEDURE — 76060000035 HC ANESTHESIA 2 TO 2.5 HR: Performed by: SURGERY

## 2021-02-12 PROCEDURE — 77030002986 HC SUT PROL J&J -A: Performed by: SURGERY

## 2021-02-12 PROCEDURE — 88304 TISSUE EXAM BY PATHOLOGIST: CPT

## 2021-02-12 PROCEDURE — 77030008684 HC TU ET CUF COVD -B: Performed by: ANESTHESIOLOGY

## 2021-02-12 PROCEDURE — 77030019908 HC STETH ESOPH SIMS -A: Performed by: ANESTHESIOLOGY

## 2021-02-12 PROCEDURE — 06BQ0ZZ EXCISION OF LEFT SAPHENOUS VEIN, OPEN APPROACH: ICD-10-PCS | Performed by: SURGERY

## 2021-02-12 PROCEDURE — 77030002996 HC SUT SLK J&J -A: Performed by: SURGERY

## 2021-02-12 PROCEDURE — 77030014008 HC SPNG HEMSTAT J&J -C: Performed by: SURGERY

## 2021-02-12 PROCEDURE — 88311 DECALCIFY TISSUE: CPT

## 2021-02-12 PROCEDURE — 74011250637 HC RX REV CODE- 250/637: Performed by: SURGERY

## 2021-02-12 PROCEDURE — 77030013079 HC BLNKT BAIR HGGR 3M -A: Performed by: ANESTHESIOLOGY

## 2021-02-12 PROCEDURE — 2709999900 HC NON-CHARGEABLE SUPPLY: Performed by: SURGERY

## 2021-02-12 PROCEDURE — 04CL0ZZ EXTIRPATION OF MATTER FROM LEFT FEMORAL ARTERY, OPEN APPROACH: ICD-10-PCS | Performed by: SURGERY

## 2021-02-12 PROCEDURE — 74011250636 HC RX REV CODE- 250/636: Performed by: ANESTHESIOLOGY

## 2021-02-12 PROCEDURE — 76210000016 HC OR PH I REC 1 TO 1.5 HR: Performed by: SURGERY

## 2021-02-12 PROCEDURE — 77030018673: Performed by: SURGERY

## 2021-02-12 PROCEDURE — 77030038692 HC WND DEB SYS IRMX -B: Performed by: SURGERY

## 2021-02-12 PROCEDURE — 77030021678 HC GLIDESCP STAT DISP VERT -B: Performed by: ANESTHESIOLOGY

## 2021-02-12 PROCEDURE — 77030008463 HC STPLR SKN PROX J&J -B: Performed by: SURGERY

## 2021-02-12 PROCEDURE — 74011250637 HC RX REV CODE- 250/637

## 2021-02-12 RX ORDER — PHENYLEPHRINE HCL IN 0.9% NACL 0.4MG/10ML
SYRINGE (ML) INTRAVENOUS AS NEEDED
Status: DISCONTINUED | OUTPATIENT
Start: 2021-02-12 | End: 2021-02-12 | Stop reason: HOSPADM

## 2021-02-12 RX ORDER — PROPOFOL 10 MG/ML
INJECTION, EMULSION INTRAVENOUS AS NEEDED
Status: DISCONTINUED | OUTPATIENT
Start: 2021-02-12 | End: 2021-02-12 | Stop reason: HOSPADM

## 2021-02-12 RX ORDER — FENTANYL CITRATE 50 UG/ML
25 INJECTION, SOLUTION INTRAMUSCULAR; INTRAVENOUS
Status: DISCONTINUED | OUTPATIENT
Start: 2021-02-12 | End: 2021-02-12

## 2021-02-12 RX ORDER — SODIUM CHLORIDE 0.9 % (FLUSH) 0.9 %
5-40 SYRINGE (ML) INJECTION EVERY 8 HOURS
Status: DISCONTINUED | OUTPATIENT
Start: 2021-02-12 | End: 2021-02-12 | Stop reason: HOSPADM

## 2021-02-12 RX ORDER — ROCURONIUM BROMIDE 10 MG/ML
INJECTION, SOLUTION INTRAVENOUS AS NEEDED
Status: DISCONTINUED | OUTPATIENT
Start: 2021-02-12 | End: 2021-02-12 | Stop reason: HOSPADM

## 2021-02-12 RX ORDER — ASPIRIN 81 MG/1
81 TABLET ORAL DAILY
Status: DISCONTINUED | OUTPATIENT
Start: 2021-02-13 | End: 2021-02-16 | Stop reason: HOSPADM

## 2021-02-12 RX ORDER — GUANFACINE HYDROCHLORIDE 1 MG/1
2 TABLET ORAL
Status: DISCONTINUED | OUTPATIENT
Start: 2021-02-12 | End: 2021-02-16 | Stop reason: HOSPADM

## 2021-02-12 RX ORDER — FENTANYL CITRATE 50 UG/ML
INJECTION, SOLUTION INTRAMUSCULAR; INTRAVENOUS AS NEEDED
Status: DISCONTINUED | OUTPATIENT
Start: 2021-02-12 | End: 2021-02-12 | Stop reason: HOSPADM

## 2021-02-12 RX ORDER — ATORVASTATIN CALCIUM 40 MG/1
40 TABLET, FILM COATED ORAL
Status: DISCONTINUED | OUTPATIENT
Start: 2021-02-12 | End: 2021-02-16 | Stop reason: HOSPADM

## 2021-02-12 RX ORDER — EPHEDRINE SULFATE/0.9% NACL/PF 50 MG/5 ML
SYRINGE (ML) INTRAVENOUS AS NEEDED
Status: DISCONTINUED | OUTPATIENT
Start: 2021-02-12 | End: 2021-02-12 | Stop reason: HOSPADM

## 2021-02-12 RX ORDER — DEXAMETHASONE SODIUM PHOSPHATE 4 MG/ML
INJECTION, SOLUTION INTRA-ARTICULAR; INTRALESIONAL; INTRAMUSCULAR; INTRAVENOUS; SOFT TISSUE AS NEEDED
Status: DISCONTINUED | OUTPATIENT
Start: 2021-02-12 | End: 2021-02-12 | Stop reason: HOSPADM

## 2021-02-12 RX ORDER — SODIUM CHLORIDE 0.9 % (FLUSH) 0.9 %
5-40 SYRINGE (ML) INJECTION AS NEEDED
Status: DISCONTINUED | OUTPATIENT
Start: 2021-02-12 | End: 2021-02-12

## 2021-02-12 RX ORDER — AMLODIPINE BESYLATE 5 MG/1
10 TABLET ORAL DAILY
Status: DISCONTINUED | OUTPATIENT
Start: 2021-02-13 | End: 2021-02-16 | Stop reason: HOSPADM

## 2021-02-12 RX ORDER — ONDANSETRON 2 MG/ML
INJECTION INTRAMUSCULAR; INTRAVENOUS AS NEEDED
Status: DISCONTINUED | OUTPATIENT
Start: 2021-02-12 | End: 2021-02-12 | Stop reason: HOSPADM

## 2021-02-12 RX ORDER — GLYCOPYRROLATE 0.2 MG/ML
INJECTION INTRAMUSCULAR; INTRAVENOUS AS NEEDED
Status: DISCONTINUED | OUTPATIENT
Start: 2021-02-12 | End: 2021-02-12 | Stop reason: HOSPADM

## 2021-02-12 RX ORDER — FLUOXETINE HYDROCHLORIDE 20 MG/1
40 CAPSULE ORAL DAILY
Status: DISCONTINUED | OUTPATIENT
Start: 2021-02-13 | End: 2021-02-16 | Stop reason: HOSPADM

## 2021-02-12 RX ORDER — HEPARIN SODIUM 1000 [USP'U]/ML
INJECTION, SOLUTION INTRAVENOUS; SUBCUTANEOUS AS NEEDED
Status: DISCONTINUED | OUTPATIENT
Start: 2021-02-12 | End: 2021-02-12 | Stop reason: HOSPADM

## 2021-02-12 RX ORDER — HYDROMORPHONE HYDROCHLORIDE 2 MG/1
1 TABLET ORAL
Status: DISCONTINUED | OUTPATIENT
Start: 2021-02-12 | End: 2021-02-16 | Stop reason: HOSPADM

## 2021-02-12 RX ORDER — HYDROMORPHONE HYDROCHLORIDE 2 MG/1
TABLET ORAL
Status: COMPLETED
Start: 2021-02-12 | End: 2021-02-12

## 2021-02-12 RX ORDER — LORATADINE 10 MG/1
10 TABLET ORAL DAILY
Status: DISCONTINUED | OUTPATIENT
Start: 2021-02-13 | End: 2021-02-16 | Stop reason: HOSPADM

## 2021-02-12 RX ORDER — SODIUM CHLORIDE 0.9 % (FLUSH) 0.9 %
5-40 SYRINGE (ML) INJECTION EVERY 8 HOURS
Status: DISCONTINUED | OUTPATIENT
Start: 2021-02-12 | End: 2021-02-12

## 2021-02-12 RX ORDER — ONDANSETRON 2 MG/ML
4 INJECTION INTRAMUSCULAR; INTRAVENOUS AS NEEDED
Status: DISCONTINUED | OUTPATIENT
Start: 2021-02-12 | End: 2021-02-12

## 2021-02-12 RX ORDER — CLOPIDOGREL BISULFATE 75 MG/1
75 TABLET ORAL DAILY
Status: DISCONTINUED | OUTPATIENT
Start: 2021-02-13 | End: 2021-02-16 | Stop reason: HOSPADM

## 2021-02-12 RX ORDER — MORPHINE SULFATE 2 MG/ML
2 INJECTION, SOLUTION INTRAMUSCULAR; INTRAVENOUS
Status: DISCONTINUED | OUTPATIENT
Start: 2021-02-12 | End: 2021-02-16 | Stop reason: HOSPADM

## 2021-02-12 RX ORDER — LIDOCAINE HYDROCHLORIDE 20 MG/ML
INJECTION, SOLUTION EPIDURAL; INFILTRATION; INTRACAUDAL; PERINEURAL AS NEEDED
Status: DISCONTINUED | OUTPATIENT
Start: 2021-02-12 | End: 2021-02-12 | Stop reason: HOSPADM

## 2021-02-12 RX ORDER — SODIUM CHLORIDE 0.9 % (FLUSH) 0.9 %
5-40 SYRINGE (ML) INJECTION AS NEEDED
Status: DISCONTINUED | OUTPATIENT
Start: 2021-02-12 | End: 2021-02-12 | Stop reason: HOSPADM

## 2021-02-12 RX ORDER — SODIUM CHLORIDE 9 MG/ML
50 INJECTION, SOLUTION INTRAVENOUS CONTINUOUS
Status: DISCONTINUED | OUTPATIENT
Start: 2021-02-12 | End: 2021-02-13

## 2021-02-12 RX ORDER — NEOSTIGMINE METHYLSULFATE 1 MG/ML
INJECTION, SOLUTION INTRAVENOUS AS NEEDED
Status: DISCONTINUED | OUTPATIENT
Start: 2021-02-12 | End: 2021-02-12 | Stop reason: HOSPADM

## 2021-02-12 RX ORDER — SODIUM CHLORIDE, SODIUM LACTATE, POTASSIUM CHLORIDE, CALCIUM CHLORIDE 600; 310; 30; 20 MG/100ML; MG/100ML; MG/100ML; MG/100ML
25 INJECTION, SOLUTION INTRAVENOUS CONTINUOUS
Status: DISCONTINUED | OUTPATIENT
Start: 2021-02-12 | End: 2021-02-12 | Stop reason: HOSPADM

## 2021-02-12 RX ORDER — ALBUMIN HUMAN 50 G/1000ML
SOLUTION INTRAVENOUS AS NEEDED
Status: DISCONTINUED | OUTPATIENT
Start: 2021-02-12 | End: 2021-02-12 | Stop reason: HOSPADM

## 2021-02-12 RX ADMIN — Medication 5 MG: at 09:28

## 2021-02-12 RX ADMIN — Medication 3 AMPULE: at 07:45

## 2021-02-12 RX ADMIN — GUANFACINE 2 MG: 1 TABLET ORAL at 20:37

## 2021-02-12 RX ADMIN — WATER 2 G: 1 INJECTION INTRAMUSCULAR; INTRAVENOUS; SUBCUTANEOUS at 09:21

## 2021-02-12 RX ADMIN — PROPOFOL 30 MG: 10 INJECTION, EMULSION INTRAVENOUS at 10:45

## 2021-02-12 RX ADMIN — ROCURONIUM BROMIDE 35 MG: 10 INJECTION INTRAVENOUS at 09:03

## 2021-02-12 RX ADMIN — FENTANYL CITRATE 50 MCG: 50 INJECTION, SOLUTION INTRAMUSCULAR; INTRAVENOUS at 10:02

## 2021-02-12 RX ADMIN — PROPOFOL 40 MG: 10 INJECTION, EMULSION INTRAVENOUS at 10:02

## 2021-02-12 RX ADMIN — SODIUM CHLORIDE, POTASSIUM CHLORIDE, SODIUM LACTATE AND CALCIUM CHLORIDE 25 ML/HR: 600; 310; 30; 20 INJECTION, SOLUTION INTRAVENOUS at 07:45

## 2021-02-12 RX ADMIN — LABETALOL HYDROCHLORIDE 300 MG: 100 TABLET, FILM COATED ORAL at 17:04

## 2021-02-12 RX ADMIN — ROCURONIUM BROMIDE 5 MG: 10 INJECTION INTRAVENOUS at 09:02

## 2021-02-12 RX ADMIN — PROPOFOL 60 MG: 10 INJECTION, EMULSION INTRAVENOUS at 09:02

## 2021-02-12 RX ADMIN — LIDOCAINE HYDROCHLORIDE 100 MG: 20 INJECTION, SOLUTION INTRAVENOUS at 09:02

## 2021-02-12 RX ADMIN — FENTANYL CITRATE 12.5 MCG: 50 INJECTION, SOLUTION INTRAMUSCULAR; INTRAVENOUS at 08:54

## 2021-02-12 RX ADMIN — HEPARIN SODIUM 7000 UNITS: 1000 INJECTION, SOLUTION INTRAVENOUS; SUBCUTANEOUS at 09:43

## 2021-02-12 RX ADMIN — ROCURONIUM BROMIDE 10 MG: 10 INJECTION INTRAVENOUS at 10:02

## 2021-02-12 RX ADMIN — Medication 10 ML: at 12:57

## 2021-02-12 RX ADMIN — Medication 3 MG: at 10:51

## 2021-02-12 RX ADMIN — PROPOFOL 20 MG: 10 INJECTION, EMULSION INTRAVENOUS at 10:23

## 2021-02-12 RX ADMIN — GLYCOPYRROLATE 0.1 MG: 0.2 INJECTION, SOLUTION INTRAMUSCULAR; INTRAVENOUS at 09:44

## 2021-02-12 RX ADMIN — ALBUMIN (HUMAN) 250 ML: 2.5 SOLUTION INTRAVENOUS at 09:11

## 2021-02-12 RX ADMIN — FENTANYL CITRATE 12.5 MCG: 50 INJECTION, SOLUTION INTRAMUSCULAR; INTRAVENOUS at 09:02

## 2021-02-12 RX ADMIN — HYDROMORPHONE HYDROCHLORIDE 1 MG: 2 TABLET ORAL at 14:30

## 2021-02-12 RX ADMIN — Medication 10 MG: at 10:05

## 2021-02-12 RX ADMIN — Medication 10 MG: at 09:52

## 2021-02-12 RX ADMIN — HYDROMORPHONE HYDROCHLORIDE 1 MG: 2 TABLET ORAL at 20:37

## 2021-02-12 RX ADMIN — GLYCOPYRROLATE 0.3 MG: 0.2 INJECTION, SOLUTION INTRAMUSCULAR; INTRAVENOUS at 10:51

## 2021-02-12 RX ADMIN — Medication 120 MCG: at 09:03

## 2021-02-12 RX ADMIN — DEXAMETHASONE SODIUM PHOSPHATE 8 MG: 4 INJECTION, SOLUTION INTRAMUSCULAR; INTRAVENOUS at 09:15

## 2021-02-12 RX ADMIN — FENTANYL CITRATE 25 MCG: 50 INJECTION, SOLUTION INTRAMUSCULAR; INTRAVENOUS at 09:31

## 2021-02-12 RX ADMIN — ATORVASTATIN CALCIUM 40 MG: 40 TABLET, FILM COATED ORAL at 20:37

## 2021-02-12 RX ADMIN — ONDANSETRON HYDROCHLORIDE 4 MG: 2 INJECTION, SOLUTION INTRAMUSCULAR; INTRAVENOUS at 10:25

## 2021-02-12 RX ADMIN — WATER 2 G: 1 INJECTION INTRAMUSCULAR; INTRAVENOUS; SUBCUTANEOUS at 16:00

## 2021-02-12 RX ADMIN — Medication 5 MG: at 09:25

## 2021-02-12 RX ADMIN — SODIUM CHLORIDE 50 ML/HR: 900 INJECTION, SOLUTION INTRAVENOUS at 11:44

## 2021-02-12 RX ADMIN — SODIUM CHLORIDE 25 MCG/MIN: 900 INJECTION, SOLUTION INTRAVENOUS at 09:11

## 2021-02-12 RX ADMIN — GLYCOPYRROLATE 0.1 MG: 0.2 INJECTION, SOLUTION INTRAMUSCULAR; INTRAVENOUS at 10:10

## 2021-02-12 NOTE — PERIOP NOTES
Negative covid 19 test result on 02/08/21. Adhered to quarantine guidelines. Denies S/S    Valuables to PACU, 1 bag.  in waiting room or via cell. [phone]. Patient states left arm bp always a lot lower than right. Right 167/84 and left 79/61. Patient states she has a blockage in her \"neck\".  Dr Renate Welch and CRNA aware

## 2021-02-12 NOTE — BRIEF OP NOTE
Brief Postoperative Note    Patient: Rachid Beck  YOB: 1950  MRN: [id number]    Date of Procedure: 2/12/2021     Pre-Op Diagnosis: Left CFA occlusion    Post-Op Diagnosis: same      Procedure(s): LEFT FEMORAL ENDARTERECTOMY WITH GSV PATCH ANGIOPLASTY   Surgeon(s):  Cheng Alvarez MD    Surgical Assistant: Surg Asst-1: Kerry Saldana    Anesthesia: General     Estimated Blood Loss (mL): 84NT    Complications: none    Specimens:   ID Type Source Tests Collected by Time Destination   1 : left femoral artery plaque Preservative Groin  Cheng Alvarez MD 2/12/2021 8647 Pathology      Findings: 2+ left DP pulse    Electronically Signed by Benton Francis MD on 2/12/2021 at 11:23 AM

## 2021-02-12 NOTE — PERIOP NOTES
8:15 AM Irrisept Wound Debridement and Cleansing System  Ref: [place] Stamp: [id number] LOT: [id number] Expiration Date: 10- used PRN for irrigation

## 2021-02-12 NOTE — PERIOP NOTES
TRANSFER - OUT REPORT:    Verbal report given to [employer]) on Kristen Mclain  being transferred to [facility](unit) for routine progression of care       Report consisted of patients Situation, Background, Assessment and   Recommendations(SBAR). Information from the following report(s) OR Summary, Intake/Output and MAR was reviewed with the receiving nurse. Opportunity for questions and clarification was provided.       Patient transported with:   O2 @ 3 liters  Registered Nurse  [facility]

## 2021-02-12 NOTE — ANESTHESIA POSTPROCEDURE EVALUATION
Procedure(s):  LEFT FEMORAL ENDARTERECTOMY WITH PATCH (URGENT). general    Anesthesia Post Evaluation      Multimodal analgesia: multimodal analgesia used between 6 hours prior to anesthesia start to PACU discharge  Patient location during evaluation: bedside  Patient participation: complete - patient participated  Level of consciousness: awake  Pain management: adequate  Airway patency: patent  Anesthetic complications: no  Cardiovascular status: acceptable  Respiratory status: acceptable  Hydration status: acceptable  Post anesthesia nausea and vomiting:  controlled  Final Post Anesthesia Temperature Assessment:  Normothermia (36.0-37.5 degrees C)      INITIAL Post-op Vital signs:   Vitals Value Taken Time   /54 02/12/21 1215   Temp 36.9 °C (98.5 °F) 02/12/21 1114   Pulse 79 02/12/21 1217   Resp 25 02/12/21 1217   SpO2 91 % 02/12/21 1217   Vitals shown include unvalidated device data.

## 2021-02-12 NOTE — ROUTINE PROCESS
TRANSFER - IN REPORT: 
 
Verbal report received from TAMMY Soares RN(name) on [employer]  being received from [employer] Writer) for routine post - op Report consisted of patients Situation, Background, Assessment and  
Recommendations(SBAR). Information from the following report(s) OR Summary was reviewed with the receiving nurse. Opportunity for questions and clarification was provided. Assessment completed upon patients arrival to unit and care assumed.

## 2021-02-12 NOTE — ANESTHESIA PREPROCEDURE EVALUATION
Relevant Problems   No relevant active problems       Anesthetic History   No history of anesthetic complications     Pertinent negatives: No PONV       Review of Systems / Medical History  Patient summary reviewed, nursing notes reviewed and pertinent labs reviewed    Pulmonary        Sleep apnea (surgical procedure to correct, did not work; scheduled to be placed on CPAP): No treatment  Smoker (former, 79 pk yrs)         Neuro/Psych       CVA (residual right sided arm and leg weakness)  Psychiatric history (depression)    Comments: (residual right sided arm and leg weakness Cardiovascular    Hypertension          CAD (no h/o MI), PAD (right carotid artery stenosis, left femoral artery stenosis withclaudication, s/p left femoral artery patch angioplasty) and hyperlipidemia    Exercise tolerance: <4 METS  Comments: 100% occlusion of right ICA, Left iCA stented    EKG 2/8/21  Normal sinus rhythm   Nonspecific ST abnormality    GI/Hepatic/Renal     GERD    Renal disease: CRI       Endo/Other        Obesity (BMI 35) and arthritis     Other Findings            Physical Exam    Airway  Mallampati: III  TM Distance: < 4 cm  Neck ROM: normal range of motion, short neck   Mouth opening: Normal     Cardiovascular      Rate: normal         Dental  No notable dental hx       Pulmonary  Breath sounds clear to auscultation               Abdominal  GI exam deferred       Other Findings            Anesthetic Plan    ASA: 3  Anesthesia type: general    Monitoring Plan: BIS      Induction: Intravenous  Anesthetic plan and risks discussed with: Patient      Huge difference in NIBP on left arm vs right arm. Right arm Bps reading 883U systolics preop vs 57G systolic in left arm. Will utilize right arm NIBP during procedure. Pt last took plavix 1 week ago. Informed pt about potential for arterial line during the case.

## 2021-02-13 ENCOUNTER — APPOINTMENT (OUTPATIENT)
Dept: GENERAL RADIOLOGY | Age: 71
DRG: 252 | End: 2021-02-13
Attending: NURSE PRACTITIONER
Payer: MEDICARE

## 2021-02-13 ENCOUNTER — APPOINTMENT (OUTPATIENT)
Dept: CT IMAGING | Age: 71
DRG: 252 | End: 2021-02-13
Attending: NURSE PRACTITIONER
Payer: MEDICARE

## 2021-02-13 LAB
ANION GAP SERPL CALC-SCNC: 8 MMOL/L (ref 5–15)
ARTERIAL PATENCY WRIST A: YES
BASE DEFICIT BLD-SCNC: 1 MMOL/L
BASOPHILS # BLD: 0 K/UL (ref 0–0.1)
BASOPHILS NFR BLD: 0 % (ref 0–1)
BDY SITE: ABNORMAL
BUN SERPL-MCNC: 17 MG/DL (ref 6–20)
BUN/CREAT SERPL: 12 (ref 12–20)
CA-I BLD-SCNC: 1.2 MMOL/L (ref 1.12–1.32)
CALCIUM SERPL-MCNC: 8 MG/DL (ref 8.5–10.1)
CHLORIDE SERPL-SCNC: 109 MMOL/L (ref 97–108)
CO2 SERPL-SCNC: 24 MMOL/L (ref 21–32)
COVID-19 RAPID TEST, COVR: NOT DETECTED
CREAT SERPL-MCNC: 1.44 MG/DL (ref 0.55–1.02)
D DIMER PPP FEU-MCNC: 0.63 MG/L FEU (ref 0–0.65)
DIFFERENTIAL METHOD BLD: ABNORMAL
EOSINOPHIL # BLD: 0 K/UL (ref 0–0.4)
EOSINOPHIL NFR BLD: 0 % (ref 0–7)
ERYTHROCYTE [DISTWIDTH] IN BLOOD BY AUTOMATED COUNT: 14 % (ref 11.5–14.5)
GAS FLOW.O2 O2 DELIVERY SYS: ABNORMAL L/MIN
GAS FLOW.O2 SETTING OXYMISER: 15 L/M
GLUCOSE SERPL-MCNC: 135 MG/DL (ref 65–100)
HCO3 BLD-SCNC: 24.3 MMOL/L (ref 22–26)
HCT VFR BLD AUTO: 37.1 % (ref 35–47)
HGB BLD-MCNC: 11.8 G/DL (ref 11.5–16)
IMM GRANULOCYTES # BLD AUTO: 0.1 K/UL (ref 0–0.04)
IMM GRANULOCYTES NFR BLD AUTO: 1 % (ref 0–0.5)
LYMPHOCYTES # BLD: 1 K/UL (ref 0.8–3.5)
LYMPHOCYTES NFR BLD: 6 % (ref 12–49)
MCH RBC QN AUTO: 28.9 PG (ref 26–34)
MCHC RBC AUTO-ENTMCNC: 31.8 G/DL (ref 30–36.5)
MCV RBC AUTO: 90.7 FL (ref 80–99)
MONOCYTES # BLD: 0.5 K/UL (ref 0–1)
MONOCYTES NFR BLD: 3 % (ref 5–13)
NEUTS SEG # BLD: 14.2 K/UL (ref 1.8–8)
NEUTS SEG NFR BLD: 90 % (ref 32–75)
NRBC # BLD: 0 K/UL (ref 0–0.01)
NRBC BLD-RTO: 0 PER 100 WBC
PCO2 BLD: 41.9 MMHG (ref 35–45)
PH BLD: 7.37 [PH] (ref 7.35–7.45)
PLATELET # BLD AUTO: 312 K/UL (ref 150–400)
PMV BLD AUTO: 10.5 FL (ref 8.9–12.9)
PO2 BLD: 75 MMHG (ref 80–100)
POTASSIUM SERPL-SCNC: 4.1 MMOL/L (ref 3.5–5.1)
RBC # BLD AUTO: 4.09 M/UL (ref 3.8–5.2)
SAO2 % BLD: 94 % (ref 92–97)
SARS-COV-2, COV2: NORMAL
SODIUM SERPL-SCNC: 141 MMOL/L (ref 136–145)
SOURCE, COVRS: NORMAL
SPECIMEN TYPE: ABNORMAL
TOTAL RESP. RATE, ITRR: 16
WBC # BLD AUTO: 15.8 K/UL (ref 3.6–11)

## 2021-02-13 PROCEDURE — 74011000636 HC RX REV CODE- 636: Performed by: SURGERY

## 2021-02-13 PROCEDURE — 74011000250 HC RX REV CODE- 250: Performed by: SURGERY

## 2021-02-13 PROCEDURE — 94640 AIRWAY INHALATION TREATMENT: CPT

## 2021-02-13 PROCEDURE — 71045 X-RAY EXAM CHEST 1 VIEW: CPT

## 2021-02-13 PROCEDURE — 97161 PT EVAL LOW COMPLEX 20 MIN: CPT

## 2021-02-13 PROCEDURE — 85025 COMPLETE CBC W/AUTO DIFF WBC: CPT

## 2021-02-13 PROCEDURE — 74011250636 HC RX REV CODE- 250/636: Performed by: SURGERY

## 2021-02-13 PROCEDURE — 87635 SARS-COV-2 COVID-19 AMP PRB: CPT

## 2021-02-13 PROCEDURE — 71275 CT ANGIOGRAPHY CHEST: CPT

## 2021-02-13 PROCEDURE — 97530 THERAPEUTIC ACTIVITIES: CPT

## 2021-02-13 PROCEDURE — 82803 BLOOD GASES ANY COMBINATION: CPT

## 2021-02-13 PROCEDURE — 36600 WITHDRAWAL OF ARTERIAL BLOOD: CPT

## 2021-02-13 PROCEDURE — 65270000029 HC RM PRIVATE

## 2021-02-13 PROCEDURE — 36415 COLL VENOUS BLD VENIPUNCTURE: CPT

## 2021-02-13 PROCEDURE — 74011000250 HC RX REV CODE- 250: Performed by: NURSE PRACTITIONER

## 2021-02-13 PROCEDURE — 80048 BASIC METABOLIC PNL TOTAL CA: CPT

## 2021-02-13 PROCEDURE — 85379 FIBRIN DEGRADATION QUANT: CPT

## 2021-02-13 PROCEDURE — 97165 OT EVAL LOW COMPLEX 30 MIN: CPT

## 2021-02-13 PROCEDURE — 97116 GAIT TRAINING THERAPY: CPT

## 2021-02-13 PROCEDURE — 77030019905 HC CATH URETH INTMIT MDII -A

## 2021-02-13 PROCEDURE — 74011250637 HC RX REV CODE- 250/637: Performed by: SURGERY

## 2021-02-13 PROCEDURE — 77010033678 HC OXYGEN DAILY

## 2021-02-13 PROCEDURE — 94760 N-INVAS EAR/PLS OXIMETRY 1: CPT

## 2021-02-13 RX ORDER — IPRATROPIUM BROMIDE AND ALBUTEROL SULFATE 2.5; .5 MG/3ML; MG/3ML
3 SOLUTION RESPIRATORY (INHALATION) ONCE
Status: COMPLETED | OUTPATIENT
Start: 2021-02-13 | End: 2021-02-13

## 2021-02-13 RX ORDER — IPRATROPIUM BROMIDE AND ALBUTEROL SULFATE 2.5; .5 MG/3ML; MG/3ML
3 SOLUTION RESPIRATORY (INHALATION)
Status: DISCONTINUED | OUTPATIENT
Start: 2021-02-13 | End: 2021-02-16 | Stop reason: HOSPADM

## 2021-02-13 RX ORDER — FUROSEMIDE 10 MG/ML
40 INJECTION INTRAMUSCULAR; INTRAVENOUS ONCE
Status: COMPLETED | OUTPATIENT
Start: 2021-02-13 | End: 2021-02-13

## 2021-02-13 RX ORDER — LEVOFLOXACIN 5 MG/ML
750 INJECTION, SOLUTION INTRAVENOUS
Status: DISCONTINUED | OUTPATIENT
Start: 2021-02-13 | End: 2021-02-16 | Stop reason: HOSPADM

## 2021-02-13 RX ORDER — LEVOFLOXACIN 5 MG/ML
500 INJECTION, SOLUTION INTRAVENOUS EVERY 24 HOURS
Status: DISCONTINUED | OUTPATIENT
Start: 2021-02-13 | End: 2021-02-13

## 2021-02-13 RX ADMIN — CLOPIDOGREL BISULFATE 75 MG: 75 TABLET ORAL at 09:31

## 2021-02-13 RX ADMIN — FLUOXETINE 40 MG: 20 CAPSULE ORAL at 09:31

## 2021-02-13 RX ADMIN — LORATADINE 10 MG: 10 TABLET ORAL at 09:31

## 2021-02-13 RX ADMIN — HYDROMORPHONE HYDROCHLORIDE 1 MG: 2 TABLET ORAL at 10:41

## 2021-02-13 RX ADMIN — ATORVASTATIN CALCIUM 40 MG: 40 TABLET, FILM COATED ORAL at 21:17

## 2021-02-13 RX ADMIN — LABETALOL HYDROCHLORIDE 300 MG: 100 TABLET, FILM COATED ORAL at 09:31

## 2021-02-13 RX ADMIN — LEVOFLOXACIN 750 MG: 5 INJECTION, SOLUTION INTRAVENOUS at 04:16

## 2021-02-13 RX ADMIN — GUANFACINE 2 MG: 1 TABLET ORAL at 21:17

## 2021-02-13 RX ADMIN — IPRATROPIUM BROMIDE AND ALBUTEROL SULFATE 3 ML: .5; 3 SOLUTION RESPIRATORY (INHALATION) at 03:27

## 2021-02-13 RX ADMIN — IOPAMIDOL 100 ML: 755 INJECTION, SOLUTION INTRAVENOUS at 03:00

## 2021-02-13 RX ADMIN — LABETALOL HYDROCHLORIDE 300 MG: 100 TABLET, FILM COATED ORAL at 17:32

## 2021-02-13 RX ADMIN — AMLODIPINE BESYLATE 10 MG: 5 TABLET ORAL at 09:31

## 2021-02-13 RX ADMIN — WATER 2 G: 1 INJECTION INTRAMUSCULAR; INTRAVENOUS; SUBCUTANEOUS at 01:03

## 2021-02-13 RX ADMIN — ASPIRIN 81 MG: 81 TABLET, COATED ORAL at 09:32

## 2021-02-13 RX ADMIN — FUROSEMIDE 40 MG: 10 INJECTION, SOLUTION INTRAMUSCULAR; INTRAVENOUS at 04:21

## 2021-02-13 NOTE — PROGRESS NOTES
Problem: Mobility Impaired (Adult and Pediatric)  Goal: *Acute Goals and Plan of Care (Insert Text)  Description: FUNCTIONAL STATUS PRIOR TO ADMISSION: Independent w/ household ambulation, use of wheelchair for community distance mobility. Reports history of multiple falls over previous 6 months. Denies home O2 use. 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  assists as needed with ADLs. HOME SUPPORT PRIOR TO ADMISSION: The patient lived with  who provides 24hr assist.    Physical Therapy Goals  Initiated 2/13/2021  1. Patient will move from supine to sit and sit to supine , scoot up and down, and roll side to side in bed with minimal assistance/contact guard assist within 7 day(s). 2.  Patient will transfer from bed to chair and chair to bed with supervision/set-up using the least restrictive device within 7 day(s). 3.  Patient will perform sit to stand with supervision/set-up within 7 day(s). 4.  Patient will ambulate with supervision/set-up for 150 feet with the least restrictive device within 7 day(s). 5.  Patient will ascend/descend 3 stairs with 1 handrail(s) with supervision/set-up within 7 day(s). Outcome: Progressing Towards Goal   PHYSICAL THERAPY EVALUATION  Patient: Paul Leon (69 y.o. female)  Date: 2/13/2021  Primary Diagnosis: Femoral artery occlusion, left (HCC) [I70.202]  Procedure(s) (LRB):  LEFT FEMORAL ENDARTERECTOMY WITH PATCH (URGENT) (Left) 1 Day Post-Op   Precautions:   Fall    ASSESSMENT  Based on the objective data described below, the patient presents with anxiety, impulsiveness, residual R sided weakness from previous CVA (RUE>RLE), impaired endurance/activity tolerance, WIN, impaired balance, and overall impaired functional mobility. Pt required max verbal cueing for safety awareness throughout as she was extremely impulsive, tending to mobilize prematurely.  Pt ambulated 30ft w/ HHAx1 and CGAx1, exhibiting decreased gait speed with mildly antalgic gait pattern. Fair gait stability overall. Anticipate gait stability would improve with use of SPC therefore will trial during next therapy session. WIN noted however O2 sats 96% post ambulation 3L O2. Anticipate pt is not far from baseline level however would benefit from additional skilled therapy to optimize safe functional mobility and decrease risk of further falls. Current Level of Function Impacting Discharge (mobility/balance): CGAx1 w/ HHA    Functional Outcome Measure: The patient scored 30/100 on the Barthel Index outcome measure which is indicative of moderate impairment in ADLs and functional mobility. Other factors to consider for discharge: hx of CVA, falls risk, caregiver burden     Patient will benefit from skilled therapy intervention to address the above noted impairments. PLAN :  Recommendations and Planned Interventions: bed mobility training, transfer training, gait training, therapeutic exercises, patient and family training/education, and therapeutic activities      Frequency/Duration: Patient will be followed by physical therapy:  4 times a week to address goals. Recommendation for discharge: (in order for the patient to meet his/her long term goals)  Physical therapy at least 2 days/week in the home AND ensure assist and/or supervision for safety with functional mobility and ADLs    This discharge recommendation:  Has not yet been discussed the attending provider and/or case management    IF patient discharges home will need the following DME: to be determined (TBD) pt may benefit from [address]:   Patient stated my feet are dangling off this bed.     OBJECTIVE DATA SUMMARY:   HISTORY:    Past Medical History:   Diagnosis Date    Adverse effect of anesthesia     sleep apnea does not use cpap machine      Arthritis     Spine, DDD    CAD (coronary artery disease) 8 yr ago    Carotid stent on right, Left is 100% Occluded, sees Dr. Rafaela Robb, PVD    Colon polyps     Depression     GERD (gastroesophageal reflux disease)     Hematuria     High cholesterol     Hypertension     Osteopenia     Psychiatric disorder     PVD (peripheral vascular disease) with claudication (HCC)     Renal insufficiency     Sleep apnea     Dx and treated with surgery without improvement, does not use CPAP    Stroke ([facility] [address] 06/17/2004    right leg weakness, right arm paralysis (treated in [place])    Thromboembolus [facility])     Visit for monitoring Plavix therapy      Past Surgical History:   Procedure Laterality Date    COLONOSCOPY N/A 1/22/2020    COLONOSCOPY performed by Liyah Rodney MD at [facility]    HX COLONOSCOPY      HX COLONOSCOPY  01/22/2020    hyperplastic polyp    HX GYN  1971    BTL    HX HEENT      Tonsilectomy    VASCULAR SURGERY PROCEDURE UNLIST Left     Carotid stent, Dr. Karmen Ronquillo, left Carotid 100% blocked    VASCULAR SURGERY PROCEDURE UNLIST Right 07/2019    vascular leg/groin surgery       Personal factors and/or comorbidities impacting plan of care: hx of CVA    Home Situation  Home Environment: Private residence  # Steps to Enter: 3  Rails to Enter: Yes  Hand Rails : Bilateral  One/Two Story Residence: Two story, live on 1st floor  Living Alone: No()  Support Systems: Spouse/Significant Other/Partner  Current DME Used/Available at Home: Walker, rolling, Wheelchair, Grab bars, Commode, bedside  Tub or Shower Type: Tub/Shower combination    EXAMINATION/PRESENTATION/DECISION MAKING:   Critical Behavior:  Neurologic State: Alert  Orientation Level: Oriented X4  Cognition: Follows commands     Hearing:   Auditory  Auditory Impairment: None  Hearing Aids/Status: Does not own  Skin:  intact  Edema: none noted   Range Of Motion:  AROM: Generally decreased, functional(RUE grossly decreased from previous CVA)                       Strength:    Strength: Generally decreased, functional(RUE grossly decreased from previous CVA)                    Tone & Sensation:   Tone: Abnormal(RUE)                              Coordination:  Coordination: Generally decreased, functional(RUE)  Vision:   Acuity: Within Defined Limits  Corrective Lenses: Glasses  Functional Mobility:  Bed Mobility:  Rolling: Maximum assistance  Supine to Sit: Maximum assistance     Scooting: Maximum assistance  Transfers:  Sit to Stand: Contact guard assistance  Stand to Sit: Contact guard assistance        Bed to Chair: Contact guard assistance              Balance:   Sitting: Impaired  Sitting - Static: Good (unsupported)  Sitting - Dynamic: Fair (occasional)  Standing: Impaired; With support  Standing - Static: Fair;Constant support  Standing - Dynamic : Fair;Constant support  Ambulation/Gait Training:  Distance (ft): 30 Feet (ft)  Assistive Device: Gait belt(HHA LUE)  Ambulation - Level of Assistance: Contact guard assistance        Gait Abnormalities: Antalgic;Decreased step clearance; Step to gait        Base of Support: Widened     Speed/Meg: Pace decreased (<100 feet/min)                       Functional Measure:  Barthel Index:    Bathin  Bladder: 0  Bowels: 5  Groomin  Dressin  Feedin  Mobility: 0  Stairs: 0  Toilet Use: 5  Transfer (Bed to Chair and Back): 10  Total: 30/100       The Barthel ADL Index: Guidelines  1. The index should be used as a record of what a patient does, not as a record of what a patient could do. 2. The main aim is to establish degree of independence from any help, physical or verbal, however minor and for whatever reason. 3. The need for supervision renders the patient not independent. 4. A patient's performance should be established using the best available evidence. Asking the patient, friends/relatives and nurses are the usual sources, but direct observation and common sense are also important. However direct testing is not needed.   5. Usually the patient's performance over the preceding 24-48 hours is important, but occasionally longer periods will be relevant. 6. Middle categories imply that the patient supplies over 50 per cent of the effort. 7. Use of aids to be independent is allowed. Rocky Quiles., Barthel, D.W. (2835). Functional evaluation: the Barthel Index. [address] [facility] (14)2. ASAEL Schmitt, Antoinette Shell., Severa Mouse., [place], [address] [facility] (1999). Measuring the change indisability after inpatient rehabilitation; comparison of the responsiveness of the Barthel Index and Functional [place] Measure. Journal of Neurology, Neurosurgery, and Psychiatry, 66(4), 814-617. Danielle Hutson, N.J.A, HUANG Rodriguez, & Gladys Owens MMEAGHAN. (2004.) Assessment of post-stroke quality of life in cost-effectiveness studies: The usefulness of the Barthel Index and the EuroQoL-5D. Quality of Life Research, 15, 319-01           Physical Therapy Evaluation Charge Determination   History Examination Presentation Decision-Making   MEDIUM  Complexity : 1-2 comorbidities / personal factors will impact the outcome/ POC  MEDIUM Complexity : 3 Standardized tests and measures addressing body structure, function, activity limitation and / or participation in recreation  MEDIUM Complexity : Evolving with changing characteristics  MEDIUM Complexity : FOTO score of 26-74      Based on the above components, the patient evaluation is determined to be of the following complexity level: MEDIUM    Pain Rating:  Reported pain in L groin however did not quantify     Activity Tolerance:   Fair    After treatment patient left in no apparent distress:   Sitting in chair, Heels elevated for pressure relief, and Call bell within reach    COMMUNICATION/EDUCATION:   The patients plan of care was discussed with: Occupational therapist and Registered nurse. Fall prevention education was provided and the patient/caregiver indicated understanding., Patient/family have participated as able in goal setting and plan of care. , and Patient/family agree to work toward stated goals and plan of care.     Thank you for this referral.  Jose Cortés, PT, DPT   Time Calculation: 22 mins

## 2021-02-13 NOTE — PROGRESS NOTES
Problem: Self Care Deficits Care Plan (Adult)  Goal: *Acute Goals and Plan of Care (Insert Text)  Description:   FUNCTIONAL STATUS PRIOR TO ADMISSION: Hx of CVA with residual R sided weakness (RUE>RLE). Independent with household mobility, uses WC for community mobility. Sits down inside walk-in tub to bathe.  provides Supervision w/ tub transfer only- she bathes herself using her L hand. Independent with all aspects of toileting using L hand. Independent with self-feeding. Stands at sink to perform grooming tasks using L hand. Requires Min A with UB and LB dressing. Wears slip on shoes. Admits to multiple falls in past 6 months. Denies home O2 use. HOME SUPPORT PRIOR TO ADMISSION: The patient lived with  who provides 24hr supervision, assist with basic ADL/IADL tasks and transportation. Occupational Therapy Goals  Initiated 2/13/2021  1. Patient will perform grooming tasks standing at the sink with supervision within 7 day(s). 2.  Patient will perform sponge bathing in supported sitting with minimal assistance within 7 day(s). 3.  Patient will perform lower body dressing with minimal assistance within 7 days. 4.  Patient will perform toilet transfers with supervision within 7 day(s). 5.  Patient will perform all aspects of toileting with supervision within 7 day(s).      Outcome: Not Met   OCCUPATIONAL THERAPY EVALUATION  Patient: Derek Colvin (69 y.o. female)  Date: 2/13/2021  Primary Diagnosis: Femoral artery occlusion, left (HCC) [I70.202]  Procedure(s) (LRB):  LEFT FEMORAL ENDARTERECTOMY WITH PATCH (URGENT) (Left) 1 Day Post-Op   Precautions:  Fall    ASSESSMENT  Based on the objective data described below, the patient presents with decreased endurance, SOB with minimal activity on 3L (not on O2 at baseline), decreased dynamic standing balance, decreased attention/concentration, impulsivity and baseline R sided weakness from prior CVA (RUE>RLE) on POD #1 s/p L femoral endarterectomy this admission. VSS with activity on 3L O2 via [place]. She is highly impulsive and exhibited difficulty following simple one-stop commands d/t distractibility, requiring therapist to provide ongoing verbal/tactile cues for safety t/o session. Relies heavily on her non-dominant L hand t/o ADL tasks at baseline given prior CVA. States her  assists minimally, yet he cannot assist her with shoe-tying given his own difficulty with bending down to don his own socks/shoes. Would benefit from session on adaptive strategies for distal LB dressing, including tailor sit. Pt is likely close to her baseline yet would benefit from continued acute skilled OT to maximize her endurance and balance with performing standing aspects of her dynamic ADL routine. Recommend [facility] and continued assist from her  at D/C. Current Level of Function Impacting Discharge (ADLs/self-care): Up to Mod A     Functional Outcome Measure: The patient scored Total: 30/100 on the Barthel Index outcome measure which is indicative of 70% impaired ability to care for basic self needs/dependency on others; inferred 70% dependency on others for instrumental ADLs. Other factors to consider for discharge: Fall risk,  can assist PRN     Patient will benefit from skilled therapy intervention to address the above noted impairments. PLAN :  Recommendations and Planned Interventions: self care training, functional mobility training, balance training, therapeutic activities, endurance activities, patient education, and home safety training    Frequency/Duration: Patient will be followed by occupational therapy 4 times a week to address goals.     Recommendation for discharge: (in order for the patient to meet his/her long term goals)  Occupational therapy at least 2 days/week in the home AND ensure assist and/or supervision for safety with standing aspects of ADL routine    This discharge recommendation:  Has been made in collaboration with the attending provider and/or case management    IF patient discharges home will need the following DME: patient owns DME required for discharge       SUBJECTIVE:   Patient stated I need to catch my breath.     OBJECTIVE DATA SUMMARY:   HISTORY:   Past Medical History:   Diagnosis Date    Adverse effect of anesthesia     sleep apnea does not use cpap machine      Arthritis     Spine, DDD    CAD (coronary artery disease) 8 yr ago    Carotid stent on right, Left is 100% Occluded, sees Dr. Rut Nowak, PVD    Colon polyps     Depression     GERD (gastroesophageal reflux disease)     Hematuria     High cholesterol     Hypertension     Osteopenia     Psychiatric disorder     PVD (peripheral vascular disease) with claudication ([facility] [address]     Renal insufficiency     Sleep apnea     Dx and treated with surgery without improvement, does not use CPAP    Stroke ([facility] [address] 06/17/2004    right leg weakness, right arm paralysis (treated in [place])    Thromboembolus ([facility] [address]     Visit for monitoring Plavix therapy      Past Surgical History:   Procedure Laterality Date    COLONOSCOPY N/A 1/22/2020    COLONOSCOPY performed by Eunice Ochoa MD at [facility] [address] HX COLONOSCOPY      HX COLONOSCOPY  01/22/2020    hyperplastic polyp    HX GYN  1971    BTL    HX HEENT      Tonsilectomy    VASCULAR SURGERY PROCEDURE UNLIST Left     Carotid stent, Dr. Rut Nowak, left Carotid 100% blocked    VASCULAR SURGERY PROCEDURE UNLIST Right 07/2019    vascular leg/groin surgery       Expanded or extensive additional review of patient history: CVA with residual R sided weakness.      Home Situation  Home Environment: Private residence  # Steps to Enter: 3  Rails to Enter: Yes  Hand Rails : Bilateral  One/Two Story Residence: Two story, live on 1st floor  Living Alone: No()  Support Systems: Spouse/Significant Other/Partner  Current DME Used/Available at Home: Walker, rolling, Wheelchair, Grab bars, Commode, bedside  Tub or Shower Type: Tub/Shower combination    Hand dominance: Right, yet L hand dominant s/p CVA    EXAMINATION OF PERFORMANCE DEFICITS:  Cognitive/Behavioral Status:  Neurologic State: Alert  Orientation Level: Oriented X4  Cognition: Follows commands             Skin: Intact    Edema: None observed    Hearing: Auditory  Auditory Impairment: None  Hearing Aids/Status: Does not own    Vision/Perceptual:                           Acuity: Within Defined Limits    Corrective Lenses: Glasses    Range of Motion:  AROM: Generally decreased, functional(RUE grossly decreased from previous CVA)                         Strength:  Strength: Generally decreased, functional(RUE grossly decreased from previous CVA)                Coordination:  Coordination: Generally decreased, functional(RUE)  Fine Motor Skills-Upper: Left Intact; Right Impaired(RUE hemiparesis from prior CVA)    Gross Motor Skills-Upper: Left Intact; Right Impaired(RUE hemiparesis from prior CVA)    Tone & Sensation:  Tone: Abnormal(RUE)  Sensation: Impaired(R hand)                      Balance:  Sitting: Impaired  Sitting - Static: Good (unsupported)  Sitting - Dynamic: Fair (occasional)  Standing: Impaired; With support  Standing - Static: Fair;Constant support  Standing - Dynamic : Fair;Constant support    Functional Mobility and Transfers for ADLs:  Bed Mobility:  Rolling: Maximum assistance  Supine to Sit: Maximum assistance  Scooting: Maximum assistance    Transfers:  Sit to Stand: Contact guard assistance  Stand to Sit: Contact guard assistance  Bed to Chair: Contact guard assistance  Bathroom Mobility: Contact guard assistance  Toilet Transfer : Contact guard assistance    ADL Assessment:  Feeding: Setup(using L hand)    Oral Facial Hygiene/Grooming: Minimum assistance    Bathing: Minimum assistance    Upper Body Dressing: Minimum assistance    Lower Body Dressing: Moderate assistance    Toileting:  Moderate assistance                ADL Intervention and task modifications:     Lower Body Dressing Assistance  Socks: Moderate assistance(using L hand)  Position Performed: Seated in chair;Bending forward method         Functional Measure:  Barthel Index:    Bathin  Bladder: 0  Bowels: 5  Groomin  Dressin  Feedin  Mobility: 0  Stairs: 0  Toilet Use: 5  Transfer (Bed to Chair and Back): 10  Total: 30/100        The Barthel ADL Index: Guidelines  1. The index should be used as a record of what a patient does, not as a record of what a patient could do. 2. The main aim is to establish degree of independence from any help, physical or verbal, however minor and for whatever reason. 3. The need for supervision renders the patient not independent. 4. A patient's performance should be established using the best available evidence. Asking the patient, friends/relatives and nurses are the usual sources, but direct observation and common sense are also important. However direct testing is not needed. 5. Usually the patient's performance over the preceding 24-48 hours is important, but occasionally longer periods will be relevant. 6. Middle categories imply that the patient supplies over 50 per cent of the effort. 7. Use of aids to be independent is allowed. Chanda Cannon., Barthel, DMARGIE. ([id number]). Functional evaluation: the Barthel Index. [address] [facility] (14)2. ASAEL Goodwin, Temi Salmeron., Gumaro Hull., [place], [address] [place] [address] (). Measuring the change indisability after inpatient rehabilitation; comparison of the responsiveness of the Barthel Index and Functional [place] Measure. Journal of Neurology, Neurosurgery, and Psychiatry, 66(4), 628-488. Petar Hilario NGEOVANI, HUANG Rodriguez, & Judith Cohen M.A. (2004.) Assessment of post-stroke quality of life in cost-effectiveness studies: The usefulness of the Barthel Index and the EuroQoL-5D.  Quality of Life Research, 15, 218-41         Occupational Therapy Evaluation Charge Determination   History Examination Decision-Making   LOW Complexity : Brief history review  MEDIUM Complexity : 3-5 performance deficits relating to physical, cognitive , or psychosocial skils that result in activity limitations and / or participation restrictions MEDIUM Complexity : Patient may present with comorbidities that affect occupational performnce. Miniml to moderate modification of tasks or assistance (eg, physical or verbal ) with assesment(s) is necessary to enable patient to complete evaluation       Based on the above components, the patient evaluation is determined to be of the following complexity level: MEDIUM  Pain Rating:  Denied pain. Activity Tolerance:   Fair, observed SOB with activity on O2    After treatment patient left in no apparent distress:    Sitting in chair and Call bell within reach    COMMUNICATION/EDUCATION:   The patients plan of care was discussed with: Physical therapist, Registered nurse, and patient . Home safety education was provided and the patient/caregiver indicated understanding., Patient/family have participated as able in goal setting and plan of care. , and Patient/family agree to work toward stated goals and plan of care.     Thank you for this referral.  Dakota Ceron OTR/L  Time Calculation: 23 mins

## 2021-02-13 NOTE — PROGRESS NOTES
Pt c/o SOB. O2 sats 77%% on 2LNC. RR 32-36. Rapid response nurse consulted. Rapid response then called overhead after her assessment. Orders from NP received.

## 2021-02-13 NOTE — PROGRESS NOTES
Vascular    RRT last night due to hypoxia  CTA showed no PE  CXR read as ?  Viral/atypical pneumonia  I disagree w/ CXR read and think it looks like pulmonary edema  Received lasix last night  Now denies SOB and SaO2 is 95% on RA  Currently has no c/o and feels better  Lt foot warm w/ 1+ DP  Lt groin flat  Cr 1.44  Overall improving  Will D/C IVF  Hold off on more lasix for now due to mild ESTRADA due to IV contrast with baseline CKD  Mobilize w/ PT/OT  Labs in AM  Wean O2 as tolerated

## 2021-02-13 NOTE — PROGRESS NOTES
Bladder scan reveals > 500 cc of urine retained in bladder. In and out straight cath done per protocol  With output of 600cc.

## 2021-02-13 NOTE — OP NOTES
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 PROCEDURE PERFORMED:  Left common femoral endarterectomy with saphenous vein patch angioplasty. SURGEON:  Freddie Grajeda MD    ANESTHESIA:  General.    COMPLICATIONS:  None. SPECIMENS REMOVED:  Femoral plaque. ESTIMATED BLOOD LOSS:  50 mL. INDICATIONS:  The patient is a 75-year-old woman with a focal occlusion of her left common femoral artery with the iliac inflow vessels and superficial femoral outflow vessels essentially undiseased. She has lifestyle limiting short distance claudication. An attempted percutaneous outpatient intervention was unsuccessful due to the inability to cross the lesion. She is admitted at this time for endarterectomy. Given that she had a similar procedure in the past with some wound healing problems on the right, she will have established saphenous vein patch angioplasty rather than a prosthetic. PROCEDURE:  After obtaining informed consent, the patient was placed supine on the operating room table. After adequate induction of general anesthesia, site and patient confirmation, and administration of prophylactic antibiotics, the lower abdomen, left groin, and thigh were prepped and draped in usual sterile fashion. Vertical left groin incision was made deep until the pulseless heavily calcified common femoral artery was dissected free and controlled. Dissection was carried cephalad under the inguinal ligament until the external iliac artery could be controlled. The superficial femoral artery and profunda femoris were dissected free and controlled as well. The patient was systemically heparinized.   A longitudinal arteriotomy was made and the common femoral lumen noted to be completely disobliterated by heavy eccentric plaque. An endarterectomy plane was achieved in the fifth portion of the vessel. This was then carried distally until an eversion endarterectomy of the profunda femoris was accomplished with excellent backbleeding and a directly visualized endarterectomy point in the superficial femoral artery was achieved with excellent backbleeding as well. Both these vessels were packed with heparinized saline and occluded. Attention was then turned to the inflow where a semi-blind endarterectomy of the external iliac artery was accomplished with what appeared to be a trouble feathering endpoint. There was excellent inflow from the external iliac artery. The endarterectomized femoral artery was meticulously inspected of loose debris, irrigated, and evacuated. A suitable length of greater saphenous vein was harvested adjacent to the saphenofemoral junction. It was opened and used to patch the femoral arteriotomy with use of two continuous 5-0 Prolene sutures. At completion of the anastomosis, all suture lines were hemostatic. There was excellent flow into both the superficial femoral and profunda femoral arteries, and a palpable dorsal pedal pulses in foot. The wound was irrigated, evacuated, deemed hemostatic, and closed in three layers of Vicryl suture and a final layer of both skin staples and interrupted nylon sutures. The patient tolerated the procedure well. There were no complications.         MD FRANCISCA [email or web address]  D:  02/13/2021 13:13  T:  02/13/2021 17:00  JOB #:  [id number]  CC:  MD Ai Hall PA-C

## 2021-02-13 NOTE — PROGRESS NOTES
RAPID RESPONSE TEAM    0145: Called by primary RN over concern of patient with decreased oxygen saturations and increased work of breathing. Upon writer's arrival to bedside, patient alert, in bed, with shortness of breath, RR 36, SpO2 77% on 2 lpm nasal canula, lungs very diminished with shallow respirations. O2 increased to 6 lpm nasal canula with little response, overhead adult rapid response called. 100% NRB placed on patient, SpO2 increased to 96%. NP Purveyor arrived at bedside orders received for STAT CBC/BMP/Ddimer/ABG, STAT chest xray. NP requests attending be notified for need of CTA of chest.     0159: Dr. Maritza Escamilla paged, updated with patient's status, MD agreeable with CTA of chest. Requests to be notified of results. 1410: Patient returned from CTA, placed on 6 lpm nasal canula, SpO2 88-90%. Audible wheezing heard through out. Purveyor NP notified, orders received for duo-neb once. 0330: Post breathing treatment, patient placed on 50 % ventimask. SpO2 91%. Attempted to instruct patient with IS, patient is very weak, unable to pull volume. 80: Dr. Marizta Escamilla paged for update. MD updated on test results and patient status. Order received for lasix 40 mg IV once and to place kirby catheter for urinary retention. MD is ok with SpO2 at 90%.      Patient Vitals for the past 12 hrs:   Temp Pulse Resp BP SpO2   02/13/21 0157     95 %   02/13/21 0148  92 (!) 36 (!) 174/66 (!) 77 %   02/12/21 2346 98.2 °F (36.8 °C) 95 20 (!) 171/62 94 %   02/12/21 2006 98.8 °F (37.1 °C) 89 20 (!) 176/71 94 %   02/12/21 1650 98.7 °F (37.1 °C) 90 20 (!) 180/67 94 %   02/12/21 1627  86 19 (!) 153/54 97 %   02/12/21 1600 98.6 °F (37 °C) 91 22 (!) 155/53 96 %     Lab Results   Component Value Date/Time    WBC 15.8 (H) 02/13/2021 02:05 AM    HGB 11.8 02/13/2021 02:05 AM    HCT 37.1 02/13/2021 02:05 AM    PLATELET 120 89/72/8759 02:05 AM    MCV 90.7 02/13/2021 02:05 AM     Lab Results   Component Value Date/Time    Sodium 141 02/13/2021 02:05 AM    Potassium 4.1 02/13/2021 02:05 AM    Chloride 109 (H) 02/13/2021 02:05 AM    CO2 24 02/13/2021 02:05 AM    Anion gap 8 02/13/2021 02:05 AM    Glucose 135 (H) 02/13/2021 02:05 AM    BUN 17 02/13/2021 02:05 AM    Creatinine 1.44 (H) 02/13/2021 02:05 AM    BUN/Creatinine ratio 12 02/13/2021 02:05 AM    GFR est AA 44 (L) 02/13/2021 02:05 AM    GFR est non-AA 36 (L) 02/13/2021 02:05 AM    Calcium 8.0 (L) 02/13/2021 02:05 AM     XR Results (most recent):  Results from Hospital Encounter encounter on 02/12/21   XR CHEST PORT    Narrative INDICATION:  SOB     COMPARISON: July 2019    FINDINGS: Single AP portable view of the chest obtained at 206 demonstrates a  stable cardiomediastinal silhouette. There is elevation of the right  hemidiaphragm. Interstitial lung disease is noted, new since prior study. Impression Interstitial opacities throughout both lungs, new since prior study;  consider atypical/viral pneumonia. CT Results (most recent):  Results from Hospital Encounter encounter on 02/12/21   CTA CHEST W OR W WO CONT    Narrative INDICATION:   SOB r/o PE     COMPARISON:  None    TECHNIQUE:  Following the uneventful intravenous administration of 100 cc Isovue  528, thin helical axial images were obtained through the chest. Postprocessing  was performed. 3D image postprocessing was performed. CT dose reduction was achieved through the use of a standardized protocol  tailored for this examination and automatic exposure control for dose  modulation. FINDINGS:    There are no enlarged mediastinal or hilar lymph nodes. The heart is enlarged. There is no pericardial effusion. No filling defect is seen within the pulmonary arterial system to suggest  pulmonary embolus. . There is diffuse aortic calcifications. The aorta is normal  in caliber. There is bibasilar atelectasis. No pulmonary nodule or mass is seen. There are  no pleural effusions.     Limited evaluation of the upper abdomen demonstrates an atrophic right kidney  with compensatory hypertrophy of the left kidney, partially visualized. A  gallstone is also noted in the gallbladder, and there is elevation of the right  hemidiaphragm. The osseous structures are unremarkable. Impression 1. No evidence of pulmonary embolus. 2.  Cardiomegaly. 3. Bibasilar atelectasis. Please call with any needs or concerns.      Alisson Guerrero  Rapid Response LUCIEN Quinonez

## 2021-02-13 NOTE — PROGRESS NOTES
Rapid Response Team Note    Called by RN at 0149 to see patient for hypoxia stat. Nakul Escobedo is a 79 y.o. WHITE OR  female admitted yesterday for left femoral artery occlusion and underwent left femoral endarterectomy with GSV patch angioplasty which was performed by Dr. Dede Koch. Of note is that she has a history of stroke with residual dysarthria and right hemiparesis. Upon entering the room the patient is noted to be resting supine in bed with some shortness of breath. Blood pressure 174/66, tachypnea present. Patient is being placed on a nonrebreather mask by respiratory therapist.  Patient is full code at this time, unable to participate in advance care planning. Patient complains of pain however is unable to identify the location of the pain. She states of shortness of breath which is new. Patient's nurse reports O2 sats dropped to 79% on 2 L NC and was increased to  6 L. Also states that patient's shortness of breath is new. Also states that patient has not voided and bladder scan has revealed more than 500 mL of urine in bladder.            Allergies   Allergen Reactions    Tylenol [Acetaminophen] Unknown (comments)     States cannot take this has problems cannot remember reaction       Ambien [Zolpidem] Other (comments)     Sleep walking    Lunesta [Eszopiclone] Other (comments)     crazy    Codeine Nausea Only        Current Facility-Administered Medications   Medication Dose Route Frequency    amLODIPine (NORVASC) tablet 10 mg  10 mg Oral DAILY    aspirin delayed-release tablet 81 mg  81 mg Oral DAILY    atorvastatin (LIPITOR) tablet 40 mg  40 mg Oral QHS    clopidogreL (PLAVIX) tablet 75 mg  75 mg Oral DAILY    FLUoxetine (PROzac) capsule 40 mg  40 mg Oral DAILY    guanFACINE IR (TENEX) tablet 2 mg  2 mg Oral QHS    labetaloL (NORMODYNE) tablet 300 mg  300 mg Oral BID    loratadine (CLARITIN) tablet 10 mg  10 mg Oral DAILY    morphine injection 2 mg  2 mg IntraVENous Q3H PRN    0.9% sodium chloride infusion  50 mL/hr IntraVENous CONTINUOUS    HYDROmorphone (DILAUDID) tablet 1 mg  1 mg Oral Q4H PRN          Visit Vitals  BP (!) 174/66   Pulse 92   Temp 98.2 °F (36.8 °C)   Resp (!) 36   Ht 4' 11\" (1.499 m)   Wt 81.5 kg (179 lb 10.8 oz)   SpO2 95%   BMI 36.29 kg/m²          Review of Systems   Respiratory: Positive for cough and shortness of breath. Cardiovascular: Negative for chest pain. Gastrointestinal: Positive for abdominal pain (Patient is unable to tell me exactly where the pain is. She is however able to tell me that the pain is lower than her chest and abdomen. Likely this is postop pain. ). Genitourinary: Positive for difficulty urinating. Physical Exam  Constitutional:       Appearance: She is ill-appearing. Cardiovascular:      Rate and Rhythm: Normal rate and regular rhythm. Pulmonary:      Effort: Tachypnea (with some shortness of breath) present. Breath sounds: Decreased air movement present. Wheezing present. Comments: patient gets winded/short of breath when talking  Abdominal:      General: There is distension (bladder). Palpations: Abdomen is soft. Neurological:      Mental Status: She is alert. Pertinent Recent Labs and Xrays:    LAB DATA REVIEWED:    No results found for this or any previous visit (from the past 24 hour(s)). No results for input(s): WBC, HGB, HCT, PLT, HGBEXT, HCTEXT, PLTEXT in the last 72 hours. No results for input(s): NA, K, CL, CO2, BUN, CREA, GLU, CA, MG, PHOS in the last 72 hours. No results for input(s): INR, INREXT in the last 72 hours. No results for input(s): PH, PCO2, PO2 in the last 72 hours. No results for input(s): PHI, PO2I, PCO2I in the last 72 hours. No results for input(s): CPK, CKNDX, TROIQ in the last 72 hours.     No lab exists for component: CPKMB  Lab Results   Component Value Date/Time    Glucose (POC) 140 (H) 02/20/2010 12:21 AM          Recent Imaging studies(If Any)    CXR Results  (Last 48 hours)               02/13/21 0218  XR CHEST PORT Final result    Impression:  Interstitial opacities throughout both lungs, new since prior study;   consider atypical/viral pneumonia. Narrative:  INDICATION:  SOB        COMPARISON: July 2019       FINDINGS: Single AP portable view of the chest obtained at 206 demonstrates a   stable cardiomediastinal silhouette. There is elevation of the right   hemidiaphragm. Interstitial lung disease is noted, new since prior study. Echo Results  (Last 48 hours)    None           CT Results  (Last 48 hours)               02/13/21 0301  CTA CHEST W OR W WO CONT Final result    Impression:  1. No evidence of pulmonary embolus. 2.  Cardiomegaly. 3. Bibasilar atelectasis. Narrative:  INDICATION:   SOB r/o PE        COMPARISON:  None       TECHNIQUE:  Following the uneventful intravenous administration of 100 cc Isovue   575, thin helical axial images were obtained through the chest. Postprocessing   was performed. 3D image postprocessing was performed. CT dose reduction was achieved through the use of a standardized protocol   tailored for this examination and automatic exposure control for dose   modulation. FINDINGS:       There are no enlarged mediastinal or hilar lymph nodes. The heart is enlarged. There is no pericardial effusion. No filling defect is seen within the pulmonary arterial system to suggest   pulmonary embolus. . There is diffuse aortic calcifications. The aorta is normal   in caliber. There is bibasilar atelectasis. No pulmonary nodule or mass is seen. There are   no pleural effusions. Limited evaluation of the upper abdomen demonstrates an atrophic right kidney   with compensatory hypertrophy of the left kidney, partially visualized. A   gallstone is also noted in the gallbladder, and there is elevation of the right   hemidiaphragm.  The osseous structures are unremarkable. EKG RESULTS     Procedure Component Value Units Date/Time    EKG, 12 lead [[id number]] Collected: 02/08/21 0814    Order Status: Completed Updated: 02/09/21 0846     Ventricular Rate 63 BPM      Atrial Rate 63 BPM      P-R Interval 174 ms      QRS Duration 78 ms      Q-T Interval 422 ms      QTC Calculation (Bezet) 431 ms      Calculated P Axis 35 degrees      Calculated R Axis 57 degrees      Calculated T Axis 118 degrees      Diagnosis --     Normal sinus rhythm  Nonspecific ST abnormality  Abnormal QRS-T angle, consider primary T wave abnormality  Nonspecific ST and T wave abnormality    Confirmed by Hussein Merida M.D. ([id number]) on 2/9/2021 8:46:12 AM                 Recent Microbiology Data(If Any)  . All Micro Results     None               Assessment, action, and plan    Acute respiratory failure with hypoxia  Shortness of breath    · Stat D-dimer was ordered and resulted 0.63  · Patient was placed on nonrebreather mask with oxygen saturations improving to 92 to 96%. · Stat ABG was ordered which was done and results of the following     Ref. Range 2/13/2021 02:04   pH (POC) Latest Ref Range: 7.35 - 7.45   7.37   pCO2 (POC) Latest Ref Range: 35.0 - 45.0 MMHG 41.9   pO2 (POC) Latest Ref Range: 80 - 100 MMHG 75 (L)   HCO3 (POC) Latest Ref Range: 22 - 26 MMOL/L 24.3   sO2 (POC) Latest Ref Range: 92 - 97 % 94   Base deficit (POC) Latest Units: mmol/L 1   Specimen type (POC) Latest Units:   ARTERIAL   Flow rate (POC) Latest Units: L/M 15   Site Latest Units:   RIGHT RADIAL   Device: Latest Units:   Non rebreather   Total resp. rate Latest Units:   16     · Chest x-ray ordered stat which was done and reports: \"Interstitial opacities throughout both lungs, new since prior study; consider atypical/viral pneumonia. \"  · Started patient on Levaquin 500 mg IV every 24 hours  · CTA chest with and without contrast was ordered and done and reported negative for pulmonary embolism. · Patient had a Covid PCR test done on 02/08/2021 which reported negative however given patient's current symptoms will check a rapid test.  · Ordered one-time DuoNeb and if patient's Covid test is negative we will continue that every 6 hours as needed and if Covid test is positive we will start patient on inhaler. · Straight cath for urine  · Stat CBC and BMP which was done and reports an elevated white blood count of 15.8. Patient's white blood count on 02/08/2021 was 7.3      0620  Received negative rapid COVID result  · Ordered duoneb q6h prn wheezing or shortness of breath  · Respiratory care consult requested           Signed by:  Lona Rodriguez DNP, ACNP-BC    Critical care time unrelated to prior notes: 45 minutes    Please note that this note was dictated using Dragon computer voice recognition software. Quite often unanticipated grammatical, syntax, homophones, and other interpretive errors are inadvertently transcribed by the computer software. Please disregard these errors. Please excuse any errors that have escaped final proofreading.

## 2021-02-14 LAB
ANION GAP SERPL CALC-SCNC: 9 MMOL/L (ref 5–15)
BUN SERPL-MCNC: 27 MG/DL (ref 6–20)
BUN/CREAT SERPL: 20 (ref 12–20)
CALCIUM SERPL-MCNC: 9 MG/DL (ref 8.5–10.1)
CHLORIDE SERPL-SCNC: 106 MMOL/L (ref 97–108)
CO2 SERPL-SCNC: 27 MMOL/L (ref 21–32)
CREAT SERPL-MCNC: 1.35 MG/DL (ref 0.55–1.02)
ERYTHROCYTE [DISTWIDTH] IN BLOOD BY AUTOMATED COUNT: 14.2 % (ref 11.5–14.5)
GLUCOSE SERPL-MCNC: 115 MG/DL (ref 65–100)
HCT VFR BLD AUTO: 34 % (ref 35–47)
HGB BLD-MCNC: 10.8 G/DL (ref 11.5–16)
MCH RBC QN AUTO: 28.3 PG (ref 26–34)
MCHC RBC AUTO-ENTMCNC: 31.8 G/DL (ref 30–36.5)
MCV RBC AUTO: 89.2 FL (ref 80–99)
NRBC # BLD: 0 K/UL (ref 0–0.01)
NRBC BLD-RTO: 0 PER 100 WBC
PLATELET # BLD AUTO: 276 K/UL (ref 150–400)
PMV BLD AUTO: 10.5 FL (ref 8.9–12.9)
POTASSIUM SERPL-SCNC: 4 MMOL/L (ref 3.5–5.1)
RBC # BLD AUTO: 3.81 M/UL (ref 3.8–5.2)
SODIUM SERPL-SCNC: 142 MMOL/L (ref 136–145)
WBC # BLD AUTO: 17.5 K/UL (ref 3.6–11)

## 2021-02-14 PROCEDURE — 94760 N-INVAS EAR/PLS OXIMETRY 1: CPT

## 2021-02-14 PROCEDURE — 2709999900 HC NON-CHARGEABLE SUPPLY

## 2021-02-14 PROCEDURE — 36415 COLL VENOUS BLD VENIPUNCTURE: CPT

## 2021-02-14 PROCEDURE — 65270000029 HC RM PRIVATE

## 2021-02-14 PROCEDURE — 85027 COMPLETE CBC AUTOMATED: CPT

## 2021-02-14 PROCEDURE — 77010033678 HC OXYGEN DAILY

## 2021-02-14 PROCEDURE — 80048 BASIC METABOLIC PNL TOTAL CA: CPT

## 2021-02-14 PROCEDURE — 74011250637 HC RX REV CODE- 250/637: Performed by: SURGERY

## 2021-02-14 RX ADMIN — LABETALOL HYDROCHLORIDE 300 MG: 100 TABLET, FILM COATED ORAL at 10:08

## 2021-02-14 RX ADMIN — HYDROMORPHONE HYDROCHLORIDE 1 MG: 2 TABLET ORAL at 20:55

## 2021-02-14 RX ADMIN — LABETALOL HYDROCHLORIDE 300 MG: 100 TABLET, FILM COATED ORAL at 17:47

## 2021-02-14 RX ADMIN — ASPIRIN 81 MG: 81 TABLET, COATED ORAL at 10:08

## 2021-02-14 RX ADMIN — FLUOXETINE 40 MG: 20 CAPSULE ORAL at 10:08

## 2021-02-14 RX ADMIN — ATORVASTATIN CALCIUM 40 MG: 40 TABLET, FILM COATED ORAL at 20:56

## 2021-02-14 RX ADMIN — CLOPIDOGREL BISULFATE 75 MG: 75 TABLET ORAL at 10:11

## 2021-02-14 RX ADMIN — AMLODIPINE BESYLATE 10 MG: 5 TABLET ORAL at 10:08

## 2021-02-14 RX ADMIN — GUANFACINE 2 MG: 1 TABLET ORAL at 20:56

## 2021-02-14 RX ADMIN — LORATADINE 10 MG: 10 TABLET ORAL at 10:08

## 2021-02-14 NOTE — PROGRESS NOTES
Pharmacy Automatic Renal Dosing Protocol - Antimicrobials    Indication for Antimicrobials: HAP (atypical pneumonia)    Current Regimen of Each Antimicrobial:  Levaquin 750 mg q 48 (start: , day 2)    Previous Antimicrobial Therapy:  Significant Cultures: none  Paralysis, amputations, malnutrition: none    Labs:  Recent Labs     21  0500 21  0205   CREA 1.35* 1.44*   BUN 27* 17   WBC 17.5* 15.8*     Temp (24hrs), Av °F (36.7 °C), Min:97.6 °F (36.4 °C), Max:98.3 °F (36.8 °C)      Is the Patient on Dialysis? No    Creatinine Clearance (mL/min):   CrCl (Actual Body Weight): 49.9  CrCl (Adjusted Body Weight): 36.7  CrCl (Ideal Body Weight): 27.9    Impression/Plan:   Levaquin dosing adjusted to 750 mg iv q48h for renal function per protocol    Antimicrobial stop date      Pharmacy will follow daily and adjust medications as appropriate for renal function and/or serum levels. Thank you,  GREGOR Wong    Recommended duration of therapy  [email or web address]/[place]/[facility]/Pharmacy/Clinical%20Companion/Duration%20of%[email or web address]. docx    Renal Dosing  http://[facility]/[place]/[facility]/Pharmacy/Clinical%20Companion/Renal%[email or web address]. pdf

## 2021-02-14 NOTE — PROGRESS NOTES
End of Shift Note    Bedside shift change report given to Yoly Macias (oncoming nurse) by Abad Benavides RN (offgoing nurse). Report included the following information SBAR, Kardex, Intake/Output, MAR and Recent Results    Shift worked:  6137-5505     Shift summary and any significant changes:     Pt worked with PT. O2 weaned to 3L NC this AM as pt tolerated. Pt becomes short of breath with ambulation, but O2 Sats do not desaturate.  at bedside spoke with physician. Concerns for physician to address:  none     Zone phone for oncoming shift:   [phone]       Activity:     Number times ambulated in hallways past shift: 0  Number of times OOB to chair past shift: 1    Cardiac:   Cardiac Monitoring: No      Cardiac Rhythm: Normal sinus rhythm    Access:   Current line(s): PIV     Genitourinary:   Urinary status: voiding and external catheter    Respiratory:   O2 Device: Nasal cannula  Chronic home O2 use?: YES  Incentive spirometer at bedside: YES     GI:     Current diet:  DIET CARDIAC Regular  Passing flatus: YES  Tolerating current diet: YES  % Diet Eaten: 100 %    Pain Management:   Patient states pain is manageable on current regimen: YES    Skin:  Juan Manuel Score: 19  Interventions: float heels and increase time out of bed    Patient Safety:  Fall Score:  Total Score: 3  Interventions: bed/chair alarm, assistive device (walker, cane, etc), gripper socks, pt to call before getting OOB and stay with me (per policy)  High Fall Risk: Yes    Length of Stay:  Expected LOS: - - -  Actual LOS: 1      Abad Benavides, RN

## 2021-02-14 NOTE — PROGRESS NOTES
Vascular    Feels much better  SOB working w/ PT yesterday  On O2 3 L  VSS  Good UOP   Groin flat  Cr improving  Elevated WBC is reactive post-op  No evidence of infection  Overall, doing well  D/C kirby  Wean O2  PT/OT

## 2021-02-14 NOTE — PROGRESS NOTES
End of Shift Note    Bedside shift change report given to Yana Rosenberg RN (oncoming nurse) by Charly Loera RN (offgoing nurse). Report included the following information SBAR    Shift worked:  1900-0730     Shift summary and any significant changes:     Uneventful shift, pt had no complaints of pain during shift. Concerns for physician to address:  None     Zone phone for oncoming shift:   [phone]       Activity:     Number times ambulated in hallways past shift: 0  Number of times OOB to chair past shift: 0    Cardiac:   Cardiac Monitoring: No      Cardiac Rhythm: Normal sinus rhythm    Access:   Current line(s): PIV     Genitourinary:   Urinary status: voiding    Respiratory:   O2 Device: Nasal cannula  Chronic home O2 use?: YES  Incentive spirometer at bedside: YES     GI:     Current diet:  DIET CARDIAC Regular  Passing flatus: YES  Tolerating current diet: YES  % Diet Eaten: 100 %    Pain Management:   Patient states pain is manageable on current regimen: YES    Skin:  Juan Manuel Score: 19  Interventions: increase time out of bed    Patient Safety:  Fall Score:  Total Score: 3  Interventions: bed/chair alarm  High Fall Risk: Yes    Length of Stay:  Expected LOS: - - -  Actual LOS: Angela Teixeira, RN

## 2021-02-14 NOTE — PROGRESS NOTES
End of Shift Note    Bedside shift change report given to Ethel Payton (oncoming nurse) by Ariella Marinelli RN (offgoing nurse). Report included the following information SBAR, Kardex, Intake/Output, MAR and Recent Results    Shift worked:  6071-7938     Shift summary and any significant changes:     Pt up to chair for breakfast & lunch; requested that kirby be removed after she returns to bed for nap. Pt able to provide self care, requiring minimal help to brush hair.  visited. O2 weaned off. Pt expresses feeling better today. Kirby removed. Possible discharge tomorrow. Concerns for physician to address:  none     Zone phone for oncoming shift:   [phone]       Activity:     Number times ambulated in hallways past shift: 0  Number of times OOB to chair past shift: 2    Cardiac:   Cardiac Monitoring: No      Cardiac Rhythm: Normal sinus rhythm    Access:   Current line(s): PIV     Genitourinary:   Urinary status: voiding    Respiratory:   O2 Device: Nasal cannula  Chronic home O2 use?: NO  Incentive spirometer at bedside: YES     GI:     Current diet:  DIET CARDIAC Regular  Passing flatus: YES  Tolerating current diet: YES  % Diet Eaten: 100 %    Pain Management:   Patient states pain is manageable on current regimen: YES    Skin:  Juan Manuel Score: 19  Interventions: float heels and increase time out of bed    Patient Safety:  Fall Score:  Total Score: 3  Interventions: bed/chair alarm, assistive device (walker, cane, etc), gripper socks, pt to call before getting OOB and stay with me (per policy)  High Fall Risk: Yes    Length of Stay:  Expected LOS: - - -  Actual LOS: 2      Ariella Marinelli RN

## 2021-02-14 NOTE — PROGRESS NOTES
RAPID RESPONSE TEAM - follow up    Rounded on patient due to recent RRT. Discussed with primary RN. No acute concerns, VSS, MEWS 1. Visit Vitals  BP (!) 155/53   Pulse 73   Temp 97.8 °F (36.6 °C)   Resp 18   Ht 4' 11\" (1.499 m)   Wt 81.5 kg (179 lb 10.8 oz)   SpO2 99%   BMI 36.29 kg/m²       No RRT interventions indicated at this time. Please call with any questions or concerns.      Ofe Falcon

## 2021-02-14 NOTE — PROGRESS NOTES
RAPID RESPONSE TEAM- Follow Up    Rounded on patient due to recent rapid response due to hypoxia. Discussed with primary RN, Ramon Urias. No acute concerns, VSS, MEWS 1. Patient Vitals for the past 12 hrs:   Temp Pulse Resp BP SpO2   02/13/21 2324 98.3 °F (36.8 °C) 83 18 (!) 155/76 100 %   02/13/21 1945 98 °F (36.7 °C) 87 18 (!) 148/50 99 %   02/13/21 1508 97.9 °F (36.6 °C) 95 22 (!) 157/89 96 %         No RRT interventions indicated at this time. Please call with any questions or concerns.      Aria Mayo  Rapid Response RN  Jose Angel Garg

## 2021-02-15 LAB
ANION GAP SERPL CALC-SCNC: 6 MMOL/L (ref 5–15)
BUN SERPL-MCNC: 26 MG/DL (ref 6–20)
BUN/CREAT SERPL: 21 (ref 12–20)
CALCIUM SERPL-MCNC: 8.6 MG/DL (ref 8.5–10.1)
CHLORIDE SERPL-SCNC: 104 MMOL/L (ref 97–108)
CO2 SERPL-SCNC: 28 MMOL/L (ref 21–32)
CREAT SERPL-MCNC: 1.23 MG/DL (ref 0.55–1.02)
GLUCOSE SERPL-MCNC: 105 MG/DL (ref 65–100)
POTASSIUM SERPL-SCNC: 3.8 MMOL/L (ref 3.5–5.1)
SODIUM SERPL-SCNC: 138 MMOL/L (ref 136–145)

## 2021-02-15 PROCEDURE — 74011250636 HC RX REV CODE- 250/636: Performed by: SURGERY

## 2021-02-15 PROCEDURE — 80048 BASIC METABOLIC PNL TOTAL CA: CPT

## 2021-02-15 PROCEDURE — 36415 COLL VENOUS BLD VENIPUNCTURE: CPT

## 2021-02-15 PROCEDURE — 97535 SELF CARE MNGMENT TRAINING: CPT | Performed by: OCCUPATIONAL THERAPIST

## 2021-02-15 PROCEDURE — 74011250637 HC RX REV CODE- 250/637: Performed by: SURGERY

## 2021-02-15 PROCEDURE — 97116 GAIT TRAINING THERAPY: CPT

## 2021-02-15 PROCEDURE — 94760 N-INVAS EAR/PLS OXIMETRY 1: CPT

## 2021-02-15 PROCEDURE — 65270000029 HC RM PRIVATE

## 2021-02-15 RX ADMIN — LABETALOL HYDROCHLORIDE 300 MG: 100 TABLET, FILM COATED ORAL at 08:25

## 2021-02-15 RX ADMIN — CLOPIDOGREL BISULFATE 75 MG: 75 TABLET ORAL at 08:25

## 2021-02-15 RX ADMIN — LABETALOL HYDROCHLORIDE 300 MG: 100 TABLET, FILM COATED ORAL at 17:24

## 2021-02-15 RX ADMIN — AMLODIPINE BESYLATE 10 MG: 5 TABLET ORAL at 08:25

## 2021-02-15 RX ADMIN — LEVOFLOXACIN 750 MG: 5 INJECTION, SOLUTION INTRAVENOUS at 03:06

## 2021-02-15 RX ADMIN — ASPIRIN 81 MG: 81 TABLET, COATED ORAL at 08:25

## 2021-02-15 RX ADMIN — LORATADINE 10 MG: 10 TABLET ORAL at 08:25

## 2021-02-15 RX ADMIN — FLUOXETINE 40 MG: 20 CAPSULE ORAL at 08:25

## 2021-02-15 RX ADMIN — ATORVASTATIN CALCIUM 40 MG: 40 TABLET, FILM COATED ORAL at 20:37

## 2021-02-15 RX ADMIN — GUANFACINE 2 MG: 1 TABLET ORAL at 20:37

## 2021-02-15 NOTE — PROGRESS NOTES
Transition of Care Plan:    Disposition: home with    Follow up appointments: PCP, Vascular Surgery   Transportation at Discharge:  will transport home   DME needed: straight cane, pt will purchase on her own   IM Medicare letter: to be reviewed prior to discharge   Caregiver Contact: Erika Castellanos (spouse) [phone]     Reason for Admission: Femoral Artery Occlusion, ASO                   RUR Score: 11%                  Plan for utilizing home health:  TBD, Pt may benefit from home health PT, SN at discharge       PCP: First and Last name:  Chhaya Young PA-C   Name of Practice:    Are you a current patient: Yes/No: yes   Approximate date of last visit: 6 months ago    Can you participate in a virtual visit with your PCP: no                    Current Advanced Directive/Advance Care Plan:   Advance Care Planning     General Advance Care Planning (ACP) Conversation    Date of Conversation: 2/15/2021  Conducted with: Patient with Decision Making Capacity    Healthcare Decision Maker:     Click here to complete [address] including selection of the [address] Relationship (ie \"Primary\")  Today we discussed Healthcare Decision Makers. The patient is considering options. Content/Action Overview:   Has NO ACP documents/care preferences - requested patient complete ACP documents  Reviewed DNR/DNI and patient elects Full Code (Attempt Resuscitation)    Length of Voluntary ACP Conversation in minutes:  16 minutes    [place]            Transition of Care Plan:  Home with spouse and possible HHC if deemed necessary by physician    Chart reviewed. CM met with pt at bedside this PM to introduce self and role. Pt alert and oriented at time of assessment. At baseline, pt resides with spouse, Erika Castellanos, in a two level home with 3 steps to enter. Pt reports that she lives on the first floor of the home. Pt shared she had a recent fall at home a few weeks ago.  Pt is typically independent and ambulatory without a device. She owns a RW, shower chair, and bedside commode. She plans to purchase a straight cane to use upon discharge. Pt on room air. Pt reports being independent with ADLs and IADLs. She typically manages her own medications. Her  can assist with grocery shopping, meals, and transportation at discharge. Spouse will transport pt home at discharge. Pt elects FULL code and no ACP on file. Discussed at length and pt is considering options. Unit CM to follow up with patient to identify willingness to complete ACP prior to d/c. Pt known to be active with PCP, last visit about 6 months ago. Preferred pharmacy is [facility]. Pt reports hx of [facility] with [facility]. Pt is to follow up with Dr. Carolina Chow after discharge and is POD#3. Pt anticipates d/c home tomorrow. PT is recommending [address] at d/c if recommended by physician. Will continue to follow for d/c planning needs. Care Management Interventions  PCP Verified by CM: Debby Conklin PA-C )  Last Visit to PCP: 07/16/20  Palliative Care Criteria Met (RRAT>21 & CHF Dx)?: No  Mode of Transport at Discharge:  Other (see comment)( )  Transition of Care Consult (CM Consult): Discharge Planning  Discharge Durable Medical Equipment: No  Physical Therapy Consult: Yes  Occupational Therapy Consult: Yes  Speech Therapy Consult: No  Current Support Network: Lives with Spouse, Own Home  Confirm Follow Up Transport: Family  The Plan for Transition of Care is Related to the Following Treatment Goals : [address] if needed   [employer] Provided?: No  Discharge Location  Discharge Placement: Home with family assistance    Yuri Washington, Jens Machado, ED [facility]  [phone]

## 2021-02-15 NOTE — PROGRESS NOTES
Problem: Mobility Impaired (Adult and Pediatric)  Goal: *Acute Goals and Plan of Care (Insert Text)  Description: FUNCTIONAL STATUS PRIOR TO ADMISSION: Independent w/ household ambulation, use of wheelchair for community distance mobility. Reports history of multiple falls over previous 6 months. Denies home O2 use. 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  assists as needed with ADLs. HOME SUPPORT PRIOR TO ADMISSION: The patient lived with  who provides 24hr assist.    Physical Therapy Goals  Initiated 2/13/2021  1. Patient will move from supine to sit and sit to supine , scoot up and down, and roll side to side in bed with minimal assistance/contact guard assist within 7 day(s). 2.  Patient will transfer from bed to chair and chair to bed with supervision/set-up using the least restrictive device within 7 day(s). 3.  Patient will perform sit to stand with supervision/set-up within 7 day(s). 4.  Patient will ambulate with supervision/set-up for 150 feet with the least restrictive device within 7 day(s). 5.  Patient will ascend/descend 3 stairs with 1 handrail(s) with supervision/set-up within 7 day(s). Outcome: Progressing Towards Goal   PHYSICAL THERAPY TREATMENT  Patient: Shar Ricketts (69 y.o. 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 ASSESSMENT  Patient continues with skilled PT services and is progressing towards goals. Pt presents with decreased strength and endurance. Pt performed bed mobility at mod A/min  A with trunk. Pt educated on getting out of bed on right side to increased independence. Pt performed sit to stand transfer at [facility] with HHA. Pt ambulated 85ft with HHA x1 at min A/CGA.  Pt only requiring min A when distracted by talking causing shuffling gait and instability. Pt with SOB after ambulation but o2 stats stable. Pt with improved mobility tolerance and safety. Current Level of Function Impacting Discharge (mobility/balance): bed mobility at mod A/min A, mobility at min A/CGA with CGA     Other factors to consider for discharge: decreased endurance, needs supervision for safety. PLAN :  Patient continues to benefit from skilled intervention to address the above impairments. Continue treatment per established plan of care. to address goals. Recommendation for discharge: (in order for the patient to meet his/her long term goals)  Physical therapy at least 2 days/week in the home AND ensure assist and/or supervision for safety with mobility. This discharge recommendation:  Has been made in collaboration with the attending provider and/or case management    IF patient discharges home will need the following DME: straight cane       SUBJECTIVE:   Patient stated  My leg is feeling better.     OBJECTIVE DATA SUMMARY:   Critical Behavior:  Neurologic State: Alert  Orientation Level: Oriented X4  Cognition: Appropriate decision making, Appropriate safety awareness, Follows commands     Functional Mobility Training:  Bed Mobility:  Rolling: Moderate assistance  Supine to Sit: Moderate assistance;Minimum assistance     Scooting: Contact guard assistance        Transfers:  Sit to Stand: Contact guard assistance  Stand to Sit: Contact guard assistance;Stand-by assistance                             Balance:  Sitting: Intact  Sitting - Static: Good (unsupported)  Sitting - Dynamic: Fair (occasional)  Standing: Impaired; With support  Standing - Static: Fair;Constant support  Standing - Dynamic : Fair;Constant support  Ambulation/Gait Training:  Distance (ft): 85 Feet (ft)  Assistive Device: Gait belt(HHA x1)  Ambulation - Level of Assistance: Minimal assistance;Contact guard assistance        Gait Abnormalities: Antalgic;Decreased step clearance;Trunk sway increased        Base of Support: Widened     Speed/Meg: Pace decreased (<100 feet/min)           Pain Rating:  Pt with no complaints of pain     Activity Tolerance:   Good, SpO2 stable on RA, and observed SOB with activity    After treatment patient left in no apparent distress:   Supine in bed, Call bell within reach, and Caregiver / family present    COMMUNICATION/COLLABORATION:   The patients plan of care was discussed with: Occupational therapist and Registered nurse.      Manny Ashton, CAMILA   Time Calculation: 17 mins

## 2021-02-15 NOTE — PROGRESS NOTES
End of Shift Note    Bedside shift change report given to Glenn Dior (oncoming nurse) by Mindy Chavez RN (offgoing nurse). Report included the following information SBAR and Kardex    Shift worked: [id number]     Shift summary and any significant changes:    Patient is alert and oriented x4, no complaints of pain, x1 assist to bathroom,  up in recliner during shift, large formed BM today     Concerns for physician to address: None     Zone phone for oncoming shift:  N/A       Activity:  Activity Level: Up with Assistance  Number times ambulated in hallways past shift: 0  Number of times OOB to chair past shift: 1    Cardiac:   Cardiac Monitoring: No      Cardiac Rhythm: Normal sinus rhythm    Access:   Current line(s): PIV     Genitourinary:   Urinary status: voiding    Respiratory:   O2 Device: Room air  Chronic home O2 use?: NO  Incentive spirometer at bedside: N/A     GI:  Last Bowel Movement Date: 02/15/21  Current diet:  DIET CARDIAC Regular  Passing flatus: YES  Tolerating current diet: YES  % Diet Eaten: 85 %    Pain Management:   Patient states pain is manageable on current regimen: YES    Skin:  Juan Manuel Score: 19  Interventions: float heels, increase time out of bed and limit briefs    Patient Safety:  Fall Score:  Total Score: 3  Interventions: bed/chair alarm, assistive device (walker, cane, etc), gripper socks and pt to call before getting OOB  High Fall Risk: Yes    Length of Stay:  Expected LOS: 4d 7h  Actual LOS: 3      Mindy Chavez RN

## 2021-02-15 NOTE — PROGRESS NOTES
POD#3  Walking in hallway  LLE SSx resolved  WIN/SOB better  Creat baseline  Groin dry  2+ L DP pulse    Home soon

## 2021-02-15 NOTE — PROGRESS NOTES
End of Shift Note    Bedside shift change report given to Nati Mcgee (oncoming nurse) by Katt Logan (offgoing nurse). Report included the following information SBAR, Kardex, Procedure Summary and MAR    Shift worked:  7a-7p     Shift summary and any significant changes:    No complaint of pain. Up in room and to bathroom with assistance. Tolerating diet well. Concerns for physician to address:    Zone phone for oncoming shift:  [phone]       Activity:  Activity Level: Up with Assistance  Number times ambulated in hallways past shift: 1  Number of times OOB to chair past shift: 3    Cardiac:   Cardiac Monitoring: No      Cardiac Rhythm: Normal sinus rhythm    Access:   Current line(s): PIV     Genitourinary:   Urinary status: voiding    Respiratory:   O2 Device: Room air  Chronic home O2 use?: NO  Incentive spirometer at bedside: N/A     GI:     Current diet:  DIET CARDIAC Regular  Passing flatus: YES  Tolerating current diet: YES  % Diet Eaten: 85 %    Pain Management:   Patient states pain is manageable on current regimen: YES    Skin:  Juan Manuel Score: 19  Interventions: float heels, increase time out of bed, PT/OT consult and limit briefs    Patient Safety:  Fall Score:  Total Score: 3  Interventions: gripper socks, pt to call before getting OOB and stay with me (per policy)  High Fall Risk: Yes    Length of Stay:  Expected LOS: 4d 7h  Actual LOS: [address]

## 2021-02-15 NOTE — PROGRESS NOTES
End of Shift Note    Bedside shift change report given to Claudene Mc, Rn (oncoming nurse) by Fabiola Hartman RN (offgoing nurse). Report included the following information SBAR    Shift worked:  [id number]     Shift summary and any significant changes:      Pt remains off oxygen, pt voided x2 during shift ambulating with 1 assist.     Concerns for physician to address:  None     Zone phone for oncoming shift:   [phone]       Activity:     Number times ambulated in hallways past shift: 0  Number of times OOB to chair past shift: 2    Cardiac:   Cardiac Monitoring: No      Cardiac Rhythm: Normal sinus rhythm    Access:   Current line(s): PIV     Genitourinary:   Urinary status: voiding    Respiratory:   O2 Device: Room air  Chronic home O2 use?: NO  Incentive spirometer at bedside: YES     GI:     Current diet:  DIET CARDIAC Regular  Passing flatus: YES  Tolerating current diet: YES  % Diet Eaten: 100 %    Pain Management:   Patient states pain is manageable on current regimen: YES    Skin:  Juan Manuel Score: 19  Interventions: increase time out of bed    Patient Safety:  Fall Score:  Total Score: 3  Interventions: gripper socks and pt to call before getting OOB  High Fall Risk: Yes    Length of Stay:  Expected LOS: - - -  Actual LOS: 79 Elicia Macias RN

## 2021-02-15 NOTE — PROGRESS NOTES
Problem: Self Care Deficits Care Plan (Adult)  Goal: *Acute Goals and Plan of Care (Insert Text)  Description:   FUNCTIONAL STATUS PRIOR TO ADMISSION: Hx of CVA with residual R sided weakness (RUE>RLE). Independent with household mobility, uses WC for community mobility. Sits down inside walk-in tub to bathe.  provides Supervision w/ tub transfer only- she bathes herself using her L hand. Independent with all aspects of toileting using L hand. Independent with self-feeding. Stands at sink to perform grooming tasks using L hand. Requires Min A with UB and LB dressing. Wears slip on shoes. Admits to multiple falls in past 6 months. Denies home O2 use. HOME SUPPORT PRIOR TO ADMISSION: The patient lived with  who provides 24hr supervision, assist with basic ADL/IADL tasks and transportation. Occupational Therapy Goals  Initiated 2/13/2021  1. Patient will perform grooming tasks standing at the sink with supervision within 7 day(s). 2.  Patient will perform sponge bathing in supported sitting with minimal assistance within 7 day(s). 3.  Patient will perform lower body dressing with minimal assistance within 7 days. 4.  Patient will perform toilet transfers with supervision within 7 day(s). 5.  Patient will perform all aspects of toileting with supervision within 7 day(s). Outcome: Progressing Towards Goal    OCCUPATIONAL THERAPY TREATMENT  Patient: Marisela Bernardo (69 y.o. 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 ASSESSMENT  Patient continues with skilled OT services and is progressing towards goals.   Patient and  feel like she is getting better and is close to her ADL baseline; frequency decreased to 3x/week for OT. Patient still requires some assistance, but he was helping her with ADL prior to admission/surgery. Patient will continue to benefit from skilled OT treatment to maximize independence and safety in ADL. PLAN :  Patient continues to benefit from skilled intervention to address the above impairments. Continue treatment per established plan of care. to address goals. Recommendation for discharge: (in order for the patient to meet his/her long term goals)  Home Health vs None    This discharge recommendation:  Has been made in collaboration with the attending provider and/or case management    IF patient discharges home will need the following DME: none       SUBJECTIVE:   Patient stated I was up for a while this morning. We walked out in the carranza.  (patient and )    OBJECTIVE DATA SUMMARY:   Cognitive/Behavioral Status:  Neurologic State: Alert  Orientation Level: Oriented X4  Cognition: Appropriate for age attention/concentration; Follows commands  Perception: Appears intact  Perseveration: No perseveration noted       Functional Mobility and Transfers for ADLs:  Bed Mobility:  Rolling: Moderate assistance  Supine to Sit: Moderate assistance;Minimum assistance  Scooting: Contact guard assistance    Transfers:  Sit to Stand: Contact guard assistance  Functional Transfers  Bathroom Mobility: Contact guard assistance  Toilet Transfer : Contact guard assistance  Cues: Verbal cues provided       Balance:  Sitting: Intact  Sitting - Static: Good (unsupported)  Sitting - Dynamic: Fair (occasional)  Standing: Impaired; With support  Standing - Static: Fair;Constant support  Standing - Dynamic : Fair;Constant support    ADL Intervention:       Grooming  Position Performed: Standing  Washing Hands: Contact guard assistance  Cues: Verbal cues provided                   Lower Body Dressing Assistance  Socks:  Moderate assistance(using L hand)  Position Performed: Seated edge of bed;Bending forward method  Cues: Verbal cues provided    Toileting  Bladder Hygiene: Supervision  Clothing Management: Contact guard assistance  Cues: Verbal cues provided         Pain:  Pain did not interfere with therapy    Activity Tolerance:   Fair    After treatment patient left in no apparent distress:   Supine in bed and Call bell within reach    COMMUNICATION/COLLABORATION:   The patients plan of care was discussed with: Physical therapist and Registered nurse.      Ailyn Piedra OTR/L  Time Calculation: 18 mins

## 2021-02-16 VITALS
SYSTOLIC BLOOD PRESSURE: 148 MMHG | BODY MASS INDEX: 36.22 KG/M2 | HEART RATE: 72 BPM | DIASTOLIC BLOOD PRESSURE: 75 MMHG | HEIGHT: 59 IN | OXYGEN SATURATION: 97 % | TEMPERATURE: 97.7 F | WEIGHT: 179.68 LBS | RESPIRATION RATE: 18 BRPM

## 2021-02-16 PROCEDURE — 94760 N-INVAS EAR/PLS OXIMETRY 1: CPT

## 2021-02-16 PROCEDURE — 74011250637 HC RX REV CODE- 250/637: Performed by: SURGERY

## 2021-02-16 PROCEDURE — 2709999900 HC NON-CHARGEABLE SUPPLY

## 2021-02-16 RX ORDER — OXYCODONE HYDROCHLORIDE 5 MG/1
5 TABLET ORAL
Qty: 12 TAB | Refills: 0 | Status: SHIPPED | OUTPATIENT
Start: 2021-02-16 | End: 2021-02-21

## 2021-02-16 RX ADMIN — LORATADINE 10 MG: 10 TABLET ORAL at 08:37

## 2021-02-16 RX ADMIN — LABETALOL HYDROCHLORIDE 300 MG: 100 TABLET, FILM COATED ORAL at 08:37

## 2021-02-16 RX ADMIN — AMLODIPINE BESYLATE 10 MG: 5 TABLET ORAL at 08:37

## 2021-02-16 RX ADMIN — FLUOXETINE 40 MG: 20 CAPSULE ORAL at 08:37

## 2021-02-16 RX ADMIN — CLOPIDOGREL BISULFATE 75 MG: 75 TABLET ORAL at 08:37

## 2021-02-16 RX ADMIN — ASPIRIN 81 MG: 81 TABLET, COATED ORAL at 08:37

## 2021-02-16 NOTE — PROGRESS NOTES
Pt being discharged per MD order. IV removed from pt, pt dressed and all personal belongings gathered. Discharge education provided, reviewed all follow up appointments. Went over medications prescribed at discharge and continued medications as well as where to get new medications. All pt and  questions answered.       Rere Daugherty RN

## 2021-02-16 NOTE — PROGRESS NOTES
End of Shift Note    Bedside shift change report given to Audrey Elena Ji RN (oncoming nurse) by Christal Gutierrez RN (offgoing nurse). Report included the following information SBAR    Shift worked:  1900-0730     Shift summary and any significant changes:     Uneventful shift     Concerns for physician to address:  None     Zone phone for oncoming shift:   [phone]       Activity:  Activity Level: Up with Assistance  Number times ambulated in hallways past shift: 0  Number of times OOB to chair past shift: 2    Cardiac:   Cardiac Monitoring: No      Cardiac Rhythm: Normal sinus rhythm    Access:   Current line(s): PIV     Genitourinary:   Urinary status: voiding    Respiratory:   O2 Device: Room air  Chronic home O2 use?: NO  Incentive spirometer at bedside: YES     GI:  Last Bowel Movement Date: 02/15/21  Current diet:  DIET CARDIAC Regular  Passing flatus: YES  Tolerating current diet: YES  % Diet Eaten: 85 %    Pain Management:   Patient states pain is manageable on current regimen: YES    Skin:  Juan Manuel Score: 19  Interventions: increase time out of bed    Patient Safety:  Fall Score:  Total Score: 4  Interventions: gripper socks and pt to call before getting OOB  High Fall Risk: Yes    Length of Stay:  Expected LOS: 4d 7h  Actual LOS: 4429 Atif Monroy RN

## 2021-02-16 NOTE — DISCHARGE SUMMARY
Vascular Surgery Discharge Summary     Patient ID:  Hiro Garza  [id number]  79 y.o.  1950    Admitting Provider: Vickey Curtis MD  Discharging Provider: Vickey Curtis MD    Admit Date: 2/12/2021    Discharge Date: 2/16/2021    Discharge Diagnoses:    Left femoral artery occlusion POA   Acute hypoxic respiratory failure nPOA  · Resolved  w/ diuresis   · Ruled out for PE  · Rule out for COVID-19 during admission   Atelectasis uPOA   Pulmonary edema uPOA   · Resolved w/ diuresis  Cardiomegaly POA  Carotid stenosis POA   ASHD POA  Hypertension POA  Hyperlipidemia POA   Hx of stroke with residual right hemiplegia POA   Diabetes mellitus POA   Chronic renal disease stage III POA   · Baseline creatinine 1.22  Renal insufficiency POA   · w/ diuresis   · Resolved  · Creatinine 1.23 on day of discharge. Anemia of acute blood loss POA  · Mild   · H&H 10.8/34 on day of discharge. Procedure(s):  LEFT FEMORAL ENDARTERECTOMY WITH [facility] 02/12/2021     Hospital Course:   Ms. Jacy Jaeger is a very please CF with a pmhx significant for ASHD, HTN, HLD, CVA w/ hemiplegia, DM, and CKD. She is admitted to the hospital s/p a left femoral endarterectomy w/ patch on 02/12/2021. For acute occlusion. Her post procedure course was complicated by acute hypoxic respiratory failure due to pulmonary edema that resolved w/ diuresis. She does not have a local cardiologist.  Arrangement will be made for her to follow up with Dr. Lluvia Loera as an outpatient (he is her spouse's cardiologist). The remainder of her stay was uneventful. She will follow up with Dr. Doris Yarbrough in 2 weeks. See above for further details.     Vital signs:   Patient Vitals for the past 24 hrs:   BP Temp Pulse Resp SpO2   02/16/21 0815 (!) 155/88 97.7 °F (36.5 °C) 78 19 93 %   02/16/21 0410 (!) 148/60 98.4 °F (36.9 °C) 80 20 92 %   02/16/21 0140 (!) 152/67 98.4 °F (36.9 °C) 76 18 93 %   02/15/21 2039 (!) 151/57 98.2 °F (36.8 °C) 83 20 92 %   02/15/21 1441 (!) 158/76 98.8 °F (37.1 °C) 79 18    02/15/21 1147 (!) 141/47 98.1 °F (36.7 °C) 67 18 91 %       Patient Weight:   Last 3 Recorded Weights in this Encounter    02/12/21 0721   Weight: 81.5 kg (179 lb 10.8 oz)       Consults: Hospitalist    Patient Condition at Discharge: stable    Disposition: home    Patient Instructions:   Current Discharge Medication List      START taking these medications    Details   oxyCODONE IR (Roxicodone) 5 mg immediate release tablet Take 1 Tab by mouth every four (4) hours as needed for Pain for up to 5 days. Max Daily Amount: 30 mg.  Qty: 12 Tab, Refills: 0    Associated Diagnoses: Femoral artery occlusion, left (HCC)         CONTINUE these medications which have NOT CHANGED    Details   atorvastatin (LIPITOR) 40 mg tablet Take 1 Tab by mouth nightly. Qty: 30 Tab, Refills: 0    Associated Diagnoses: Hyperlipidemia, unspecified hyperlipidemia type      FLUoxetine (PROzac) 40 mg capsule Take 1 Cap by mouth daily. Qty: 90 Cap, Refills: 1    Associated Diagnoses: Depression, unspecified depression type      labetaloL (NORMODYNE) 300 mg tablet Take 1 Tab by mouth two (2) times a day. Qty: 180 Tab, Refills: 1    Associated Diagnoses: Essential hypertension      guanFACINE IR (TENEX) 2 mg IR tablet Take 1 Tab by mouth nightly. Qty: 90 Tab, Refills: 1    Associated Diagnoses: Essential hypertension; Psychiatric disorder      amLODIPine (NORVASC) 10 mg tablet Take 1 Tab by mouth daily. Qty: 90 Tab, Refills: 1    Associated Diagnoses: Essential hypertension      loratadine (CLARITIN) 10 mg tablet Take 10 mg by mouth.      calcium-cholecalciferol, d3, (CALCIUM 600 + D) 600-125 mg-unit tab Take  by mouth. clopidogreL (PLAVIX) 75 mg tab Take 1 Tab by mouth daily. Qty: 90 Tab, Refills: 1    Associated Diagnoses: History of CVA (cerebrovascular accident)      aspirin delayed-release 81 mg tablet Take  by mouth daily.              Diet: Cardiac Diet and Diabetic Diet    Activity/Wound Care: Discharge Instructions After Vascular Surgery    Activity  Dont drive for at least 7 days after your surgery or while you are taking opioid pain medicine (or if you are still having a lot of leg pain). Expect to start walking soon after surgery. Walking helps reduce swelling and helps your cut (incision) heal. But you will likely have some leg swelling after surgery. Dont stand or sit with your feet down for long. When you sit, raise your feet as high as you comfortably can. Home care  Check your incision every day for signs of infection such as swelling, redness, warmth, or drainage. Dont bathe or soak in a tub or go swimming until your incisions are well healed (usually at least 2 weeks). You can shower to keep your incisions clean. Just make sure you dry them well afterward. Take your medicines exactly as directed. Dont skip doses. Avoid skin burns by testing the temperature of shower water before you get in. Wear slippers or shoes when walking. Dont go barefoot or wear open-toed shoes. Follow-up  See your doctor to have your stitches or staples removed 2 weeks after your surgery.     When to call your healthcare provider   Call your provider right away if you have any of the following:    Fever of 100.4°F (38°C)    Signs of infection (redness, swelling, or warmth at the incision site)    Drainage from your incision    Changes in color, temperature, feeling, or movement in either foot    Increasing pain or numbness in your foot or leg    Leg swelling that doesn't get better overnight    Chest pain or trouble breathing      Follow-up Information     Follow up With Specialties Details Why Contact Info    Hank Sterling MD General and Vascular Surgery In 2 weeks  [address] [place] [address]  [phone]      [facility] Physician Assistant   [facility] [address]  [facility]  [phone]  [address], Pradip Liao MD Cardiology  3-4 weeks at the [facility] office please.   [address]  [id number]  [facility] [address]  [phone]            Signed:  Irma Estrella NP  2/16/2021  9:26 AM

## 2021-02-16 NOTE — ROUTINE PROCESS
The following appointments have been successfully scheduled: 
 
Date/time Tuesday, February 23, 2021 10:00 AM 
Patient Alicia Klein 1950 (84HK F) #[id number] [facility] OFFICE Appointment type Any Provider [facility]

## 2021-02-16 NOTE — PROGRESS NOTES
Transition of Care Plan:     Disposition: Home with    Follow up appointments: PCP,- CM emailed CM Specialist to make PCP appt. Vascular Surgery - Dr Claude Neve: Appt scheduled: 3/2/21 at 10:45 AM Info on AVS.   Cardiology appt with Dr. Librado Telles at the [facility] office: Appt scheduled and on AVS.   Transportation at Discharge:  will transport home   DME needed: [employer] Madhavi Odor was ordered by Pt  through [place] and will be delivered to the home. IM Medicare letter: delivered today. Medicare pt has received, reviewed, and signed 2nd  letter informing them of their right to appeal the discharge. Signed copy has been placed on pt bedside chart. Caregiver Contact: Sisi Campuzano (spouse) [phone]     No further CM needs. Care Management Interventions  PCP Verified by CM: Yes  Last Visit to PCP: 07/16/20  Palliative Care Criteria Met (RRAT>21 & CHF Dx)?: No  Mode of Transport at Discharge:  Other (see comment)  Transition of Care Consult (CM Consult): Discharge Planning  MyChart Signup: No  Discharge Durable Medical Equipment: No  Physical Therapy Consult: No  Occupational Therapy Consult: No  Speech Therapy Consult: No  Current Support Network: Lives with Spouse, Own Home  Confirm Follow Up Transport: Family  The Plan for Transition of Care is Related to the Following Treatment Goals : [address] if needed   [employer] Provided?: No  Discharge Location  Discharge Placement: [address], Hussein Thornton

## 2021-02-16 NOTE — DISCHARGE INSTRUCTIONS
Discharge Instructions After Vascular Surgery    Activity  Dont drive for at least 7 days after your surgery or while you are taking opioid pain medicine (or if you are still having a lot of leg pain). Expect to start walking soon after surgery. Walking helps reduce swelling and helps your cut (incision) heal. But you will likely have some leg swelling after surgery. Dont stand or sit with your feet down for long. When you sit, raise your feet as high as you comfortably can. Home care  Check your incision every day for signs of infection such as swelling, redness, warmth, or drainage. Dont bathe or soak in a tub or go swimming until your incisions are well healed (usually at least 2 weeks). You can shower to keep your incisions clean. Just make sure you dry them well afterward. Take your medicines exactly as directed. Dont skip doses. Avoid skin burns by testing the temperature of shower water before you get in. Wear slippers or shoes when walking. Dont go barefoot or wear open-toed shoes. Follow-up  See your doctor to have your stitches or staples removed 2 weeks after your surgery.     When to call your healthcare provider   Call your provider right away if you have any of the following:    Fever of 100.4°F (38°C)    Signs of infection (redness, swelling, or warmth at the incision site)    Drainage from your incision    Changes in color, temperature, feeling, or movement in either foot    Increasing pain or numbness in your foot or leg    Leg swelling that doesn't get better overnight    Chest pain or trouble breathing

## 2021-06-22 ENCOUNTER — HOSPITAL ENCOUNTER (OUTPATIENT)
Dept: MAMMOGRAPHY | Age: 71
Discharge: HOME | End: 2021-06-22
Attending: FAMILY MEDICINE
Payer: MEDICARE

## 2021-06-22 DIAGNOSIS — Z12.31 VISIT FOR SCREENING MAMMOGRAM: ICD-10-CM

## 2021-06-22 PROCEDURE — 77063 BREAST TOMOSYNTHESIS BI: CPT

## 2021-09-20 DIAGNOSIS — F99 PSYCHIATRIC DISORDER: ICD-10-CM

## 2021-09-20 DIAGNOSIS — I10 ESSENTIAL HYPERTENSION: ICD-10-CM

## 2021-09-21 RX ORDER — GUANFACINE HYDROCHLORIDE 2 MG/1
TABLET ORAL
Qty: 30 TABLET | Refills: 0 | Status: SHIPPED | OUTPATIENT
Start: 2021-09-21 | End: 2022-08-10 | Stop reason: SDUPTHER

## 2022-03-18 PROBLEM — I70.202 FEMORAL ARTERY OCCLUSION, LEFT (HCC): Status: ACTIVE | Noted: 2021-02-12

## 2022-03-18 PROBLEM — T81.89XA LYMPHOCELE AFTER SURGICAL PROCEDURE: Status: ACTIVE | Noted: 2019-08-05

## 2022-03-18 PROBLEM — I89.8 LYMPHOCELE AFTER SURGICAL PROCEDURE: Status: ACTIVE | Noted: 2019-08-05

## 2022-03-18 PROBLEM — Z86.73 HISTORY OF CVA (CEREBROVASCULAR ACCIDENT): Status: ACTIVE | Noted: 2018-11-07

## 2022-03-18 PROBLEM — I10 ESSENTIAL HYPERTENSION: Status: ACTIVE | Noted: 2018-11-07

## 2022-03-18 PROBLEM — Z86.718 HISTORY OF CEREBRAL THROMBOSIS: Status: ACTIVE | Noted: 2019-08-07

## 2022-03-18 PROBLEM — E78.5 HYPERLIPIDEMIA: Status: ACTIVE | Noted: 2018-11-07

## 2022-03-19 PROBLEM — K63.5 COLON POLYP: Status: ACTIVE | Noted: 2020-01-22

## 2022-03-19 PROBLEM — R56.9 CONVULSION (HCC): Status: ACTIVE | Noted: 2019-08-07

## 2022-03-19 PROBLEM — I70.201 FEMORAL ARTERY OCCLUSION, RIGHT (HCC): Status: ACTIVE | Noted: 2019-07-12

## 2022-03-19 PROBLEM — R41.82 ALTERED MENTAL STATUS, UNSPECIFIED: Status: ACTIVE | Noted: 2019-08-07

## 2022-03-19 PROBLEM — I73.9 CLAUDICATION OF RIGHT LOWER EXTREMITY (HCC): Status: ACTIVE | Noted: 2019-07-12

## 2022-03-19 PROBLEM — I65.21 STENOSIS OF RIGHT CAROTID ARTERY: Status: ACTIVE | Noted: 2018-11-07

## 2022-03-19 PROBLEM — I65.23 BILATERAL CAROTID ARTERY STENOSIS: Status: ACTIVE | Noted: 2019-08-07

## 2022-03-19 PROBLEM — R41.3 DISTURBANCE OF MEMORY: Status: ACTIVE | Noted: 2019-08-07

## 2022-03-19 PROBLEM — K57.30 DIVERTICULA OF COLON: Status: ACTIVE | Noted: 2020-01-22

## 2022-03-19 PROBLEM — Z86.010 HX OF COLONIC POLYPS: Status: ACTIVE | Noted: 2020-01-22

## 2022-04-06 DIAGNOSIS — F99 PSYCHIATRIC DISORDER: ICD-10-CM

## 2022-04-06 DIAGNOSIS — I10 ESSENTIAL HYPERTENSION: ICD-10-CM

## 2022-04-06 RX ORDER — GUANFACINE HYDROCHLORIDE 2 MG/1
TABLET ORAL
Qty: 30 TABLET | Refills: 0 | OUTPATIENT
Start: 2022-04-06

## 2022-08-10 ENCOUNTER — HOSPITAL ENCOUNTER (OUTPATIENT)
Dept: MAMMOGRAPHY | Age: 72
Discharge: HOME | End: 2022-08-10
Attending: NURSE PRACTITIONER
Payer: MEDICARE

## 2022-08-10 VITALS — BODY MASS INDEX: 34.74 KG/M2 | WEIGHT: 172 LBS

## 2022-08-10 DIAGNOSIS — F99 PSYCHIATRIC DISORDER: ICD-10-CM

## 2022-08-10 DIAGNOSIS — I10 ESSENTIAL HYPERTENSION: ICD-10-CM

## 2022-08-10 DIAGNOSIS — Z12.31 VISIT FOR SCREENING MAMMOGRAM: ICD-10-CM

## 2022-08-10 PROCEDURE — 77063 BREAST TOMOSYNTHESIS BI: CPT

## 2022-08-10 RX ORDER — GUANFACINE HYDROCHLORIDE 2 MG/1
2 TABLET ORAL
Qty: 30 TABLET | Refills: 0 | Status: SHIPPED | OUTPATIENT
Start: 2022-08-10 | End: 2022-08-19

## 2022-08-10 NOTE — TELEPHONE ENCOUNTER
Requested Prescriptions     Pending Prescriptions Disp Refills    guanFACINE IR (TENEX) 2 mg IR tablet 30 Tablet 0     Sig: Take 1 Tablet by mouth nightly. Patient has appointment on August 15th with Nanci Lowery but is completely out of medication. Can she get enough called in to get her to her appt.

## 2022-08-10 NOTE — TELEPHONE ENCOUNTER
Requested Prescriptions     Pending Prescriptions Disp Refills    guanFACINE IR (TENEX) 2 mg IR tablet 30 Tablet 0     Sig: Take 1 Tablet by mouth nightly.      Last seen:  Visit date not found  Last filled:  09/21/21 for # 30 with no refills

## 2022-08-15 ENCOUNTER — OFFICE VISIT (OUTPATIENT)
Dept: FAMILY MEDICINE CLINIC | Age: 72
End: 2022-08-15
Payer: MEDICARE

## 2022-08-15 VITALS
OXYGEN SATURATION: 98 % | SYSTOLIC BLOOD PRESSURE: 136 MMHG | TEMPERATURE: 97 F | BODY MASS INDEX: 36.89 KG/M2 | HEIGHT: 59 IN | RESPIRATION RATE: 18 BRPM | HEART RATE: 63 BPM | WEIGHT: 183 LBS | DIASTOLIC BLOOD PRESSURE: 82 MMHG

## 2022-08-15 DIAGNOSIS — I10 ESSENTIAL HYPERTENSION: Primary | ICD-10-CM

## 2022-08-15 DIAGNOSIS — F99 PSYCHIATRIC DISORDER: ICD-10-CM

## 2022-08-15 DIAGNOSIS — E78.5 HYPERLIPIDEMIA, UNSPECIFIED HYPERLIPIDEMIA TYPE: ICD-10-CM

## 2022-08-15 DIAGNOSIS — E66.01 SEVERE OBESITY (BMI 35.0-39.9) WITH COMORBIDITY (HCC): ICD-10-CM

## 2022-08-15 DIAGNOSIS — Z86.73 HISTORY OF CVA (CEREBROVASCULAR ACCIDENT): ICD-10-CM

## 2022-08-15 PROBLEM — R56.9 CONVULSION (HCC): Status: RESOLVED | Noted: 2019-08-07 | Resolved: 2022-08-15

## 2022-08-15 PROCEDURE — 99214 OFFICE O/P EST MOD 30 MIN: CPT | Performed by: PHYSICIAN ASSISTANT

## 2022-08-15 RX ORDER — FUROSEMIDE 20 MG/1
20 TABLET ORAL DAILY
COMMUNITY
Start: 2022-07-15

## 2022-08-15 NOTE — PROGRESS NOTES
Moe Koenig is a 67 y.o. female who presents to the office today with the following:  Chief Complaint   Patient presents with    Follow-up    Hypertension    Cholesterol Problem       Follow-up    Hypertension     Cholesterol Problem    Pt presents today not fasting, but is overdue for labs. She also would like to re-establish care here, has been going to another provider for the last couple of years. Says it has been a while since she has seen them as well, but she has felt like she has been stable on all of her medications. She has no new complaints/concerns today. HTN  Stable on meds. No checks at home. No cardiac complaints. H/o right carotid artery stenosis w/ stent, h/o CVA with chronic right arm weakness, and bilat LE thromboembolisms. On anticoagulation with plavix. No recurrent symptoms. No complaints of bruising/bleeding. Followed by vascular specialist Dr. Rafaela Robb. Depression still well-managed on fluoxetine. No psych complaints. No med SEs. Lab Results   Component Value Date/Time    Cholesterol, total 166 07/02/2020 09:31 AM    HDL Cholesterol 63 07/02/2020 09:31 AM    LDL, calculated 84 07/02/2020 09:31 AM    VLDL, calculated 19 07/02/2020 09:31 AM    Triglyceride 97 07/02/2020 09:31 AM   Doing well on statin therapy. Lab Results   Component Value Date/Time    ALT (SGPT) 20 02/08/2021 08:07 AM    AST (SGOT) 10 (L) 02/08/2021 08:07 AM    Alk. phosphatase 90 02/08/2021 08:07 AM    Bilirubin, total 0.4 02/08/2021 08:07 AM     ROS  See HPI.     Past Medical History:   Diagnosis Date    Adverse effect of anesthesia     sleep apnea does not use cpap machine      Arthritis     Spine, DDD    CAD (coronary artery disease) 8 yr ago    Carotid stent on right, Left is 100% Occluded, sees Dr. Rafaela Robb, PVD    Colon polyps     Depression     GERD (gastroesophageal reflux disease)     Hematuria     High cholesterol     Hypertension     Osteopenia     Psychiatric disorder     PVD (peripheral vascular disease) with claudication ([facility]     Renal insufficiency     Sleep apnea     Dx and treated with surgery without improvement, does not use CPAP    Stroke ([facility] 06/17/2004    right leg weakness, right arm paralysis (treated in [place])    Thromboembolus [facility])     Visit for monitoring Plavix therapy        Past Surgical History:   Procedure Laterality Date    COLONOSCOPY N/A 1/22/2020    COLONOSCOPY performed by [facility]yah Rodney MD at [facility]    HX COLONOSCOPY      HX COLONOSCOPY  01/22/2020    hyperplastic polyp    HX GYN  1971    BTL    HX HEENT      Tonsilectomy    VASCULAR SURGERY PROCEDURE UNLIST Left     Carotid stent, Dr. Karmen Ronquillo, left Carotid 100% blocked    VASCULAR SURGERY PROCEDURE UNLIST Right 07/2019    vascular leg/groin surgery       Allergies   Allergen Reactions    Tylenol [Acetaminophen] Unknown (comments)     States cannot take this has problems cannot remember reaction       Ambien [Zolpidem] Other (comments)     Sleep walking    Lunesta [Eszopiclone] Other (comments)     crazy    Codeine Nausea Only       Current Outpatient Medications   Medication Sig    guanFACINE IR (TENEX) 2 mg IR tablet Take 1 Tablet by mouth nightly. atorvastatin (LIPITOR) 40 mg tablet Take 1 Tab by mouth nightly. FLUoxetine (PROzac) 40 mg capsule Take 1 Cap by mouth daily. labetaloL (NORMODYNE) 300 mg tablet Take 1 Tab by mouth two (2) times a day. amLODIPine (NORVASC) 10 mg tablet Take 1 Tab by mouth daily. clopidogreL (PLAVIX) 75 mg tab Take 1 Tab by mouth daily. loratadine (CLARITIN) 10 mg tablet Take 10 mg by mouth.    calcium-cholecalciferol, d3, 600-125 mg-unit tab Take  by mouth. aspirin delayed-release 81 mg tablet Take  by mouth daily. furosemide (LASIX) 20 mg tablet Take 20 mg by mouth in the morning. No current facility-administered medications for this visit.        Social History     Socioeconomic History    Marital status:    Tobacco Use    Smoking status: Former Packs/day: 2.00     Years: 35.00     Pack years: 70.00     Types: Cigarettes     Quit date: 2003     Years since quittin.1    Smokeless tobacco: Never   Vaping Use    Vaping Use: Never used   Substance and Sexual Activity    Alcohol use: Yes     Alcohol/week: 2.0 standard drinks     Types: 2 Cans of beer per week     Comment: last drink one month ago per pt and spouse on 19    Drug use: Never    Sexual activity: Not Currently   Social History Narrative    ** Merged History Encounter **            Family History   Problem Relation Age of Onset    Heart Disease Mother         unsure specifics, just knows of stroke    Other Mother         PAD    COPD Mother     Cancer Maternal Grandmother     COPD Father     Other Father          GSW    Other Sister         PAD    COPD Sister     Cancer Sister         colon at age 45    No Known Problems Child          Physical Exam:  Visit Vitals  /82 (BP 1 Location: Left upper arm, BP Patient Position: At rest, BP Cuff Size: Adult)   Pulse 63   Temp 97 °F (36.1 °C) (Temporal)   Resp 18   Ht 4' 11\" (1.499 m)   Wt 183 lb (83 kg)   SpO2 98%   BMI 36.96 kg/m²     Physical Exam  Vitals and nursing note reviewed. Constitutional:       Appearance: Normal appearance. She is obese. HENT:      Head: Normocephalic and atraumatic. Eyes:      Conjunctiva/sclera: Conjunctivae normal.   Cardiovascular:      Rate and Rhythm: Normal rate and regular rhythm. Pulses: Normal pulses. Heart sounds: Normal heart sounds. Pulmonary:      Effort: Pulmonary effort is normal.      Breath sounds: Normal breath sounds. Musculoskeletal:         General: Swelling (trace pitting bilat LEs, no erythema/warmth or ttp) present. Cervical back: Neck supple. Skin:     General: Skin is warm and dry. Neurological:      Mental Status: She is alert and oriented to person, place, and time.    Psychiatric:         Mood and Affect: Mood normal.       Assessment/Plan: ICD-10-CM ICD-9-CM    1. Essential hypertension  P05 957.3 METABOLIC PANEL, COMPREHENSIVE      CBC WITH AUTOMATED DIFF      CBC WITH AUTOMATED DIFF      METABOLIC PANEL, COMPREHENSIVE      2. Hyperlipidemia, unspecified hyperlipidemia type  E78.5 272.4 LIPID PANEL      LIPID PANEL      3. History of CVA (cerebrovascular accident)  Z86.73 V12.54       4. Severe obesity (BMI 35.0-39. 9) with comorbidity ([facility]  E66.01 278.01       5. Psychiatric disorder  F99 298.9       Will notify patient of lab results and any further recommendations. Pt stable on current regimen. Likely routine 6 month f/up. She will request MR for vaccines (Says she has had both Shingrx and Pneumonia vaccines from pharmacy)  Seek care in interim for any new sxs or other concerns. Pt verbalizes understanding and agrees with the plan.     Alyx Berrios PA-C

## 2022-08-15 NOTE — LETTER
8/22/2022 7:18 AM    Ms. Lino Lies  [address]        Overall your blood count and lipid panel are within normal limits. Your kidney tests are slightly elevated, but stable overtime. Continue to stay well hydrated and limit or avoid NSAID use, also keep BP under good control to avoid further damage. I recommend routine 6 month follow-up.     Sincerely,      Kyra Kat PA-C

## 2022-08-15 NOTE — PROGRESS NOTES
Successful venipuncture in patient's left ac X 1   sticks. The patient tolerated this procedure w/o c/o pain or discomfort  . 1. \"Have you been to the ER, urgent care clinic since your last visit? Hospitalized since your last visit? \" No    2. \"Have you seen or consulted any other health care providers outside of the [address] [place] [address] since your last visit? \" No     3. For patients aged 39-70: Has the patient had a colonoscopy / FIT/ Cologuard? Yes - no Care Gap present      If the patient is female:    4. For patients aged 41-77: Has the patient had a mammogram within the past 2 years? Yes - no Care Gap present      5. For patients aged 21-65: Has the patient had a pap smear?  Yes - no Care Gap present

## 2022-08-16 LAB
ALBUMIN SERPL-MCNC: 3.7 G/DL (ref 3.5–5)
ALBUMIN/GLOB SERPL: 1.2 {RATIO} (ref 1.1–2.2)
ALP SERPL-CCNC: 85 U/L (ref 45–117)
ALT SERPL-CCNC: 23 U/L (ref 12–78)
ANION GAP SERPL CALC-SCNC: 5 MMOL/L (ref 5–15)
AST SERPL-CCNC: 12 U/L (ref 15–37)
BASOPHILS # BLD: 0.1 K/UL (ref 0–0.1)
BASOPHILS NFR BLD: 1 % (ref 0–1)
BILIRUB SERPL-MCNC: 0.4 MG/DL (ref 0.2–1)
BUN SERPL-MCNC: 13 MG/DL (ref 6–20)
BUN/CREAT SERPL: 11 (ref 12–20)
CALCIUM SERPL-MCNC: 9.1 MG/DL (ref 8.5–10.1)
CHLORIDE SERPL-SCNC: 106 MMOL/L (ref 97–108)
CHOLEST SERPL-MCNC: 157 MG/DL
CO2 SERPL-SCNC: 30 MMOL/L (ref 21–32)
CREAT SERPL-MCNC: 1.23 MG/DL (ref 0.55–1.02)
DIFFERENTIAL METHOD BLD: NORMAL
EOSINOPHIL # BLD: 0.2 K/UL (ref 0–0.4)
EOSINOPHIL NFR BLD: 2 % (ref 0–7)
ERYTHROCYTE [DISTWIDTH] IN BLOOD BY AUTOMATED COUNT: 13.5 % (ref 11.5–14.5)
GLOBULIN SER CALC-MCNC: 3.1 G/DL (ref 2–4)
GLUCOSE SERPL-MCNC: 82 MG/DL (ref 65–100)
HCT VFR BLD AUTO: 39.9 % (ref 35–47)
HDLC SERPL-MCNC: 55 MG/DL
HDLC SERPL: 2.9 {RATIO} (ref 0–5)
HGB BLD-MCNC: 13 G/DL (ref 11.5–16)
IMM GRANULOCYTES # BLD AUTO: 0 K/UL (ref 0–0.04)
IMM GRANULOCYTES NFR BLD AUTO: 0 % (ref 0–0.5)
LDLC SERPL CALC-MCNC: 82.8 MG/DL (ref 0–100)
LYMPHOCYTES # BLD: 1.8 K/UL (ref 0.8–3.5)
LYMPHOCYTES NFR BLD: 26 % (ref 12–49)
MCH RBC QN AUTO: 29.6 PG (ref 26–34)
MCHC RBC AUTO-ENTMCNC: 32.6 G/DL (ref 30–36.5)
MCV RBC AUTO: 90.9 FL (ref 80–99)
MONOCYTES # BLD: 0.7 K/UL (ref 0–1)
MONOCYTES NFR BLD: 10 % (ref 5–13)
NEUTS SEG # BLD: 4.2 K/UL (ref 1.8–8)
NEUTS SEG NFR BLD: 61 % (ref 32–75)
NRBC # BLD: 0 K/UL (ref 0–0.01)
NRBC BLD-RTO: 0 PER 100 WBC
PLATELET # BLD AUTO: 324 K/UL (ref 150–400)
PMV BLD AUTO: 11 FL (ref 8.9–12.9)
POTASSIUM SERPL-SCNC: 3.8 MMOL/L (ref 3.5–5.1)
PROT SERPL-MCNC: 6.8 G/DL (ref 6.4–8.2)
RBC # BLD AUTO: 4.39 M/UL (ref 3.8–5.2)
SODIUM SERPL-SCNC: 141 MMOL/L (ref 136–145)
TRIGL SERPL-MCNC: 96 MG/DL (ref ?–150)
VLDLC SERPL CALC-MCNC: 19.2 MG/DL
WBC # BLD AUTO: 7 K/UL (ref 3.6–11)

## 2022-08-18 DIAGNOSIS — F99 PSYCHIATRIC DISORDER: ICD-10-CM

## 2022-08-18 DIAGNOSIS — I10 ESSENTIAL HYPERTENSION: ICD-10-CM

## 2022-08-19 RX ORDER — GUANFACINE HYDROCHLORIDE 2 MG/1
TABLET ORAL
Qty: 30 TABLET | Refills: 5 | Status: SHIPPED | OUTPATIENT
Start: 2022-08-19

## 2022-08-29 DIAGNOSIS — F32.A DEPRESSION, UNSPECIFIED DEPRESSION TYPE: ICD-10-CM

## 2022-08-29 DIAGNOSIS — E78.5 HYPERLIPIDEMIA, UNSPECIFIED HYPERLIPIDEMIA TYPE: ICD-10-CM

## 2022-08-29 DIAGNOSIS — I10 ESSENTIAL HYPERTENSION: ICD-10-CM

## 2022-08-29 NOTE — TELEPHONE ENCOUNTER
Requested Prescriptions     Pending Prescriptions Disp Refills    amLODIPine (NORVASC) 10 mg tablet 90 Tablet 1     Sig: Take 1 Tablet by mouth daily. atorvastatin (LIPITOR) 40 mg tablet 30 Tablet 0     Sig: Take 1 Tablet by mouth nightly. FLUoxetine (PROzac) 40 mg capsule 90 Capsule 1     Sig: Take 1 Capsule by mouth daily.

## 2022-08-30 RX ORDER — AMLODIPINE BESYLATE 10 MG/1
10 TABLET ORAL DAILY
Qty: 90 TABLET | Refills: 0 | Status: SHIPPED | OUTPATIENT
Start: 2022-08-30 | End: 2022-10-27 | Stop reason: SDUPTHER

## 2022-08-30 RX ORDER — ATORVASTATIN CALCIUM 40 MG/1
40 TABLET, FILM COATED ORAL
Qty: 90 TABLET | Refills: 0 | Status: SHIPPED | OUTPATIENT
Start: 2022-08-30

## 2022-08-30 RX ORDER — FLUOXETINE HYDROCHLORIDE 40 MG/1
40 CAPSULE ORAL DAILY
Qty: 90 CAPSULE | Refills: 0 | Status: SHIPPED | OUTPATIENT
Start: 2022-08-30

## 2022-09-22 NOTE — PROGRESS NOTES
gianna
mona General Surgery End of Shift Nursing Note    Bedside shift change report given to Rosaura Lambert (oncoming nurse) by Arnold Thornton (offgoing nurse). Report included the following information SBAR, Intake/Output and MAR. Shift worked:   [id number]   Summary of shift:    Up to [facility] with assistance overnight. Total DANA output was 30 mL- mostly serous with a red tint at times. Wound vac in place overnight- zero output noted.     Issues for physician to address:   Lynn Law

## 2022-10-27 DIAGNOSIS — I10 ESSENTIAL HYPERTENSION: ICD-10-CM

## 2022-10-27 DIAGNOSIS — Z86.73 HISTORY OF CVA (CEREBROVASCULAR ACCIDENT): ICD-10-CM

## 2022-10-27 NOTE — TELEPHONE ENCOUNTER
Requested Prescriptions     Pending Prescriptions Disp Refills    amLODIPine (NORVASC) 10 mg tablet 90 Tablet 0     Sig: Take 1 Tablet by mouth daily.

## 2022-10-28 RX ORDER — AMLODIPINE BESYLATE 10 MG/1
10 TABLET ORAL DAILY
Qty: 30 TABLET | Refills: 0 | Status: SHIPPED | OUTPATIENT
Start: 2022-10-28

## 2022-10-31 NOTE — TELEPHONE ENCOUNTER
Requested Prescriptions     Pending Prescriptions Disp Refills    clopidogreL (PLAVIX) 75 mg tab 90 Tablet 1     Sig: Take 1 Tablet by mouth daily. labetaloL (NORMODYNE) 300 mg tablet 180 Tablet 1     Sig: Take 1 Tablet by mouth two (2) times a day. Signed Prescriptions Disp Refills    amLODIPine (NORVASC) 10 mg tablet 30 Tablet 0     Sig: Take 1 Tablet by mouth daily.      Authorizing Provider: Paolo Gaines

## 2022-11-01 RX ORDER — CLOPIDOGREL BISULFATE 75 MG/1
75 TABLET ORAL DAILY
Qty: 90 TABLET | Refills: 1 | Status: SHIPPED | OUTPATIENT
Start: 2022-11-01

## 2022-11-01 RX ORDER — LABETALOL 300 MG/1
300 TABLET, FILM COATED ORAL 2 TIMES DAILY
Qty: 180 TABLET | Refills: 1 | Status: SHIPPED | OUTPATIENT
Start: 2022-11-01

## 2022-11-28 DIAGNOSIS — E78.5 HYPERLIPIDEMIA, UNSPECIFIED HYPERLIPIDEMIA TYPE: ICD-10-CM

## 2022-11-28 DIAGNOSIS — F32.A DEPRESSION, UNSPECIFIED DEPRESSION TYPE: ICD-10-CM

## 2022-11-28 RX ORDER — ATORVASTATIN CALCIUM 40 MG/1
TABLET, FILM COATED ORAL
Qty: 90 TABLET | Refills: 0 | Status: SHIPPED | OUTPATIENT
Start: 2022-11-28

## 2022-11-28 RX ORDER — FLUOXETINE HYDROCHLORIDE 40 MG/1
CAPSULE ORAL
Qty: 90 CAPSULE | Refills: 0 | Status: SHIPPED | OUTPATIENT
Start: 2022-11-28

## 2023-01-11 DIAGNOSIS — F99 PSYCHIATRIC DISORDER: ICD-10-CM

## 2023-01-11 DIAGNOSIS — I10 ESSENTIAL HYPERTENSION: ICD-10-CM

## 2023-01-11 RX ORDER — GUANFACINE HYDROCHLORIDE 2 MG/1
TABLET ORAL
Qty: 90 TABLET | Refills: 0 | Status: SHIPPED | OUTPATIENT
Start: 2023-01-11

## 2023-02-17 DIAGNOSIS — I10 ESSENTIAL HYPERTENSION: ICD-10-CM

## 2023-02-17 RX ORDER — AMLODIPINE BESYLATE 10 MG/1
10 TABLET ORAL DAILY
Qty: 30 TABLET | Refills: 0 | Status: SHIPPED | OUTPATIENT
Start: 2023-02-17

## 2023-02-17 NOTE — TELEPHONE ENCOUNTER
Requested Prescriptions     Pending Prescriptions Disp Refills    amLODIPine (NORVASC) 10 mg tablet 30 Tablet 0     Sig: Take 1 Tablet by mouth daily.

## 2023-02-28 ENCOUNTER — OFFICE VISIT (OUTPATIENT)
Dept: FAMILY MEDICINE CLINIC | Age: 73
End: 2023-02-28
Payer: MEDICARE

## 2023-02-28 VITALS
DIASTOLIC BLOOD PRESSURE: 54 MMHG | WEIGHT: 182.6 LBS | TEMPERATURE: 97.4 F | SYSTOLIC BLOOD PRESSURE: 84 MMHG | HEIGHT: 59 IN | BODY MASS INDEX: 36.81 KG/M2 | HEART RATE: 69 BPM | OXYGEN SATURATION: 96 % | RESPIRATION RATE: 14 BRPM

## 2023-02-28 DIAGNOSIS — F32.A DEPRESSION, UNSPECIFIED DEPRESSION TYPE: ICD-10-CM

## 2023-02-28 DIAGNOSIS — I10 ESSENTIAL HYPERTENSION: ICD-10-CM

## 2023-02-28 DIAGNOSIS — E78.5 HYPERLIPIDEMIA, UNSPECIFIED HYPERLIPIDEMIA TYPE: Primary | ICD-10-CM

## 2023-02-28 DIAGNOSIS — E66.01 SEVERE OBESITY (BMI 35.0-39.9) WITH COMORBIDITY (HCC): ICD-10-CM

## 2023-02-28 DIAGNOSIS — N18.30 STAGE 3 CHRONIC KIDNEY DISEASE, UNSPECIFIED WHETHER STAGE 3A OR 3B CKD (HCC): ICD-10-CM

## 2023-02-28 PROCEDURE — 1101F PT FALLS ASSESS-DOCD LE1/YR: CPT | Performed by: PHYSICIAN ASSISTANT

## 2023-02-28 PROCEDURE — G8427 DOCREV CUR MEDS BY ELIG CLIN: HCPCS | Performed by: PHYSICIAN ASSISTANT

## 2023-02-28 PROCEDURE — G8432 DEP SCR NOT DOC, RNG: HCPCS | Performed by: PHYSICIAN ASSISTANT

## 2023-02-28 PROCEDURE — G9899 SCRN MAM PERF RSLTS DOC: HCPCS | Performed by: PHYSICIAN ASSISTANT

## 2023-02-28 PROCEDURE — G8536 NO DOC ELDER MAL SCRN: HCPCS | Performed by: PHYSICIAN ASSISTANT

## 2023-02-28 PROCEDURE — 1090F PRES/ABSN URINE INCON ASSESS: CPT | Performed by: PHYSICIAN ASSISTANT

## 2023-02-28 PROCEDURE — 3078F DIAST BP <80 MM HG: CPT | Performed by: PHYSICIAN ASSISTANT

## 2023-02-28 PROCEDURE — G8399 PT W/DXA RESULTS DOCUMENT: HCPCS | Performed by: PHYSICIAN ASSISTANT

## 2023-02-28 PROCEDURE — G8417 CALC BMI ABV UP PARAM F/U: HCPCS | Performed by: PHYSICIAN ASSISTANT

## 2023-02-28 PROCEDURE — 3017F COLORECTAL CA SCREEN DOC REV: CPT | Performed by: PHYSICIAN ASSISTANT

## 2023-02-28 PROCEDURE — 3074F SYST BP LT 130 MM HG: CPT | Performed by: PHYSICIAN ASSISTANT

## 2023-02-28 PROCEDURE — 99214 OFFICE O/P EST MOD 30 MIN: CPT | Performed by: PHYSICIAN ASSISTANT

## 2023-02-28 PROCEDURE — 1123F ACP DISCUSS/DSCN MKR DOCD: CPT | Performed by: PHYSICIAN ASSISTANT

## 2023-02-28 PROCEDURE — 36415 COLL VENOUS BLD VENIPUNCTURE: CPT | Performed by: PHYSICIAN ASSISTANT

## 2023-02-28 RX ORDER — AMLODIPINE BESYLATE 10 MG/1
10 TABLET ORAL DAILY
Qty: 90 TABLET | Refills: 1 | Status: SHIPPED | OUTPATIENT
Start: 2023-03-15

## 2023-02-28 NOTE — PROGRESS NOTES
Successful venipuncture in patient's Left hand X 2 sticks. The patient tolerated this procedure w/o c/o pain or discomfort.

## 2023-02-28 NOTE — PROGRESS NOTES
1. \"Have you been to the ER, urgent care clinic since your last visit? Hospitalized since your last visit? \" No    2. \"Have you seen or consulted any other health care providers outside of the [address] [place] [address] since your last visit? \" Yes Where: Dentist and eye doctor      3. For patients aged 39-70: Has the patient had a colonoscopy / FIT/ Cologuard? Yes - no Care Gap present      If the patient is female:    4. For patients aged 41-77: Has the patient had a mammogram within the past 2 years? Yes - no Care Gap present      5. For patients aged 21-65: Has the patient had a pap smear?  NA - based on age or sex

## 2023-02-28 NOTE — PROGRESS NOTES
Delroy Musa is a 67 y.o. female who presents to the office today with the following:  Chief Complaint   Patient presents with    Follow-up     Wants you to take over all her medications and she needs refills       HPI  HTN, CAD, PVD, CKD- also s/p stroke, thromboemb, carotid stenosis with stent. Has cuff but no checks at home. She feels stable on current meds. BP low today but pt feels okay. She admits she may have been d/c off one of her BP meds and forgot about it, going to different providers. On Plavix, labetolol, and amlodipine. Pt otherwise reports feeling well with no other complaints or acute concerns. Feels at her baseline health. Followed by Dr. Breanne Orr (cardiology) and Dr. Belinda Brody (vascular) q6m. Lab Results   Component Value Date/Time    Sodium 141 08/15/2022 03:36 PM    Potassium 3.8 08/15/2022 03:36 PM    Chloride 106 08/15/2022 03:36 PM    CO2 30 08/15/2022 03:36 PM    Anion gap 5 08/15/2022 03:36 PM    Glucose 82 08/15/2022 03:36 PM    BUN 13 08/15/2022 03:36 PM    Creatinine 1.23 (H) 08/15/2022 03:36 PM    BUN/Creatinine ratio 11 (L) 08/15/2022 03:36 PM    GFR est AA 52 (L) 08/15/2022 03:36 PM    GFR est non-AA 43 (L) 08/15/2022 03:36 PM    Calcium 9.1 08/15/2022 03:36 PM    Bilirubin, total 0.4 08/15/2022 03:36 PM    Alk. phosphatase 85 08/15/2022 03:36 PM    Protein, total 6.8 08/15/2022 03:36 PM    Albumin 3.7 08/15/2022 03:36 PM    Globulin 3.1 08/15/2022 03:36 PM    A-G Ratio 1.2 08/15/2022 03:36 PM    ALT (SGPT) 23 08/15/2022 03:36 PM    AST (SGOT) 12 (L) 08/15/2022 03:36 PM     Pt doing well on current statin therapy. Denies med SEs or myalgias. Is fasting today for labs.   Lab Results   Component Value Date/Time    Cholesterol, total 157 08/15/2022 03:36 PM    HDL Cholesterol 55 08/15/2022 03:36 PM    LDL, calculated 82.8 08/15/2022 03:36 PM    VLDL, calculated 19.2 08/15/2022 03:36 PM    Triglyceride 96 08/15/2022 03:36 PM    CHOL/HDL Ratio 2.9 08/15/2022 03:36 PM Lab Results   Component Value Date/Time    ALT (SGPT) 23 08/15/2022 03:36 PM    AST (SGOT) 12 (L) 08/15/2022 03:36 PM    Alk. phosphatase 85 08/15/2022 03:36 PM    Bilirubin, total 0.4 08/15/2022 03:36 PM     H/o depression. Pt reports symptoms still manageable on fluoxetine. Denies active symptoms, SI/HI or other. Still need MR for immunizations. Pt says she is UTD from [employer]. Review of Systems   Constitutional: Negative. HENT:          Dry nose at times, discussed with pt/, realized they forgot to put on their humidifier    Respiratory: Negative. Negative for shortness of breath. Cardiovascular: Negative. Negative for chest pain. Gastrointestinal: Negative. See HPI.     Past Medical History:   Diagnosis Date    Adverse effect of anesthesia     sleep apnea does not use cpap machine      Arthritis     Spine, DDD    CAD (coronary artery disease) 8 yr ago    Carotid stent on right, Left is 100% Occluded, sees Dr. Jorden Hansen, PVD    Colon polyps     Depression     GERD (gastroesophageal reflux disease)     Hematuria     High cholesterol     Hypertension     Osteopenia     Psychiatric disorder     PVD (peripheral vascular disease) with claudication (HCC)     Renal insufficiency     Sleep apnea     Dx and treated with surgery without improvement, does not use CPAP    Stroke ([facility] [address] 06/17/2004    right leg weakness, right arm paralysis (treated in [place])    Thromboembolus [facility])     Visit for monitoring Plavix therapy        Past Surgical History:   Procedure Laterality Date    COLONOSCOPY N/A 1/22/2020    COLONOSCOPY performed by Ramón Parra MD at [facility]    HX COLONOSCOPY      HX COLONOSCOPY  01/22/2020    hyperplastic polyp    HX GYN  1971    BTL    HX HEENT      Tonsilectomy    [place] UNLISTED PROCEDURE VASCULAR SURGERY Left     Carotid stent, Dr. Jorden Hansen, left Carotid 100% blocked    [place] UNLISTED PROCEDURE VASCULAR SURGERY Right 07/2019    vascular leg/groin surgery       Allergies Allergen Reactions    Tylenol [Acetaminophen] Unknown (comments)     States cannot take this has problems cannot remember reaction       Ambien [Zolpidem] Other (comments)     Sleep walking    Lunesta [Eszopiclone] Other (comments)     crazy    Codeine Nausea Only       Current Outpatient Medications   Medication Sig    [START ON 3/15/2023] amLODIPine (NORVASC) 10 mg tablet Take 1 Tablet by mouth daily. FLUoxetine (PROzac) 40 mg capsule TAKE 1 CAPSULE EVERY DAY    atorvastatin (LIPITOR) 40 mg tablet TAKE 1 TABLET EVERY NIGHT    clopidogreL (PLAVIX) 75 mg tab Take 1 Tablet by mouth daily. labetaloL (NORMODYNE) 300 mg tablet Take 1 Tablet by mouth two (2) times a day. calcium-cholecalciferol, d3, 600-125 mg-unit tab Take  by mouth. No current facility-administered medications for this visit. Social History     Socioeconomic History    Marital status:    Tobacco Use    Smoking status: Former     Packs/day: 2.00     Years: 35.00     Pack years: 70.00     Types: Cigarettes     Quit date: 2003     Years since quittin.6    Smokeless tobacco: Never   Vaping Use    Vaping Use: Never used   Substance and Sexual Activity    Alcohol use:  Yes     Alcohol/week: 2.0 standard drinks     Types: 2 Cans of beer per week     Comment: last drink one month ago per pt and spouse on 19    Drug use: Never    Sexual activity: Not Currently   Social History Narrative    ** Merged History Encounter **          Social Determinants of Health     Financial Resource Strain: Low Risk     Difficulty of Paying Living Expenses: Not hard at all   Food Insecurity: No Food Insecurity    Worried About [employer] [employer] in the Last Year: Never true    [address] [facility] [address] in the Last Year: Never true   Transportation Needs: No Transportation Needs    Lack of Transportation (Medical): No    Lack of Transportation (Non-Medical): No   Housing Stability: Unknown    Unable to Pay for Housing in the Last Year: No Unstable Housing in the Last Year: No       Family History   Problem Relation Age of Onset    Heart Disease Mother         unsure specifics, just knows of stroke    Other Mother         PAD    COPD Mother     Cancer Maternal Grandmother     COPD Father     Other Father          GSW    Other Sister         PAD    COPD Sister     Cancer Sister         colon at age 45    No Known Problems Child          Physical Exam:  Visit Vitals  BP (!) 84/54 (BP 1 Location: Left upper arm, BP Patient Position: Sitting, BP Cuff Size: Adult)   Pulse 69   Temp 97.4 °F (36.3 °C) (Temporal)   Resp 14   Ht 4' 11\" (1.499 m)   Wt 182 lb 9.6 oz (82.8 kg)   SpO2 96%   BMI 36.88 kg/m²     Physical Exam  Vitals and nursing note reviewed. Constitutional:       Appearance: Normal appearance. She is obese. HENT:      Head: Normocephalic and atraumatic. Right Ear: Tympanic membrane, ear canal and external ear normal.      Left Ear: Tympanic membrane, ear canal and external ear normal.      Nose: Nose normal.      Mouth/Throat:      Mouth: Mucous membranes are moist.      Pharynx: Oropharynx is clear. Eyes:      Conjunctiva/sclera: Conjunctivae normal.   Cardiovascular:      Rate and Rhythm: Normal rate and regular rhythm. Pulses: Normal pulses. Heart sounds: Normal heart sounds. Pulmonary:      Effort: Pulmonary effort is normal.      Breath sounds: Normal breath sounds. Musculoskeletal:         General: No swelling. Cervical back: Neck supple. Skin:     General: Skin is warm and dry. Neurological:      Mental Status: She is alert and oriented to person, place, and time. Psychiatric:         Mood and Affect: Mood normal.       Assessment/Plan:    ICD-10-CM ICD-9-CM    1. Hyperlipidemia, unspecified hyperlipidemia type  E78.5 272.4 LIPID PANEL      LIPID PANEL      2.  Essential hypertension  I10 401.9 amLODIPine (NORVASC) 10 mg tablet      METABOLIC PANEL, BASIC      METABOLIC PANEL, BASIC      ID COLLECTION VENOUS BLOOD VENIPUNCTURE      3. Severe obesity (BMI 35.0-39. 9) with comorbidity ([facility] [address]  E66.01 278.01       4. Stage 3 chronic kidney disease, unspecified whether stage 3a or 3b CKD (HCC)  N18.30 585.3       5. Depression, unspecified depression type  F32. A 311           Will notify patient of lab results and any further recommendations. Pt to hold amlodipine for low BP. Monitor at home, report log in 1 week, sooner if any concerns. Resume for BP >130/80s. Otherwise 6 mo f/up. Work on LMs. Seek care in interim for any new sxs or other concerns. Pt verbalizes understanding and agrees with the plan.     Kevon Martins PA-C

## 2023-03-01 LAB
ANION GAP SERPL CALC-SCNC: 12 MMOL/L (ref 5–15)
BUN SERPL-MCNC: 11 MG/DL (ref 6–20)
BUN/CREAT SERPL: 10 (ref 12–20)
CALCIUM SERPL-MCNC: 9.2 MG/DL (ref 8.5–10.1)
CHLORIDE SERPL-SCNC: 105 MMOL/L (ref 97–108)
CHOLEST SERPL-MCNC: 179 MG/DL
CO2 SERPL-SCNC: 24 MMOL/L (ref 21–32)
CREAT SERPL-MCNC: 1.14 MG/DL (ref 0.55–1.02)
GLUCOSE SERPL-MCNC: 92 MG/DL (ref 65–100)
HDLC SERPL-MCNC: 61 MG/DL
HDLC SERPL: 2.9 (ref 0–5)
LDLC SERPL CALC-MCNC: 99.6 MG/DL (ref 0–100)
POTASSIUM SERPL-SCNC: 4.3 MMOL/L (ref 3.5–5.1)
SODIUM SERPL-SCNC: 141 MMOL/L (ref 136–145)
TRIGL SERPL-MCNC: 92 MG/DL (ref ?–150)
VLDLC SERPL CALC-MCNC: 18.4 MG/DL

## 2023-03-02 NOTE — PROGRESS NOTES
[facility]

## 2023-04-20 ENCOUNTER — OFFICE VISIT (OUTPATIENT)
Dept: FAMILY MEDICINE CLINIC | Age: 73
End: 2023-04-20
Payer: MEDICARE

## 2023-04-20 VITALS
BODY MASS INDEX: 36.57 KG/M2 | SYSTOLIC BLOOD PRESSURE: 94 MMHG | WEIGHT: 181.4 LBS | DIASTOLIC BLOOD PRESSURE: 62 MMHG | OXYGEN SATURATION: 98 % | HEIGHT: 59 IN | HEART RATE: 71 BPM | RESPIRATION RATE: 14 BRPM | TEMPERATURE: 97.7 F

## 2023-04-20 DIAGNOSIS — I10 ESSENTIAL HYPERTENSION: Primary | ICD-10-CM

## 2023-04-20 PROCEDURE — G8536 NO DOC ELDER MAL SCRN: HCPCS | Performed by: PHYSICIAN ASSISTANT

## 2023-04-20 PROCEDURE — 1123F ACP DISCUSS/DSCN MKR DOCD: CPT | Performed by: PHYSICIAN ASSISTANT

## 2023-04-20 PROCEDURE — 1090F PRES/ABSN URINE INCON ASSESS: CPT | Performed by: PHYSICIAN ASSISTANT

## 2023-04-20 PROCEDURE — 3074F SYST BP LT 130 MM HG: CPT | Performed by: PHYSICIAN ASSISTANT

## 2023-04-20 PROCEDURE — G8417 CALC BMI ABV UP PARAM F/U: HCPCS | Performed by: PHYSICIAN ASSISTANT

## 2023-04-20 PROCEDURE — G8432 DEP SCR NOT DOC, RNG: HCPCS | Performed by: PHYSICIAN ASSISTANT

## 2023-04-20 PROCEDURE — 3017F COLORECTAL CA SCREEN DOC REV: CPT | Performed by: PHYSICIAN ASSISTANT

## 2023-04-20 PROCEDURE — 3078F DIAST BP <80 MM HG: CPT | Performed by: PHYSICIAN ASSISTANT

## 2023-04-20 PROCEDURE — G8427 DOCREV CUR MEDS BY ELIG CLIN: HCPCS | Performed by: PHYSICIAN ASSISTANT

## 2023-04-20 PROCEDURE — 99213 OFFICE O/P EST LOW 20 MIN: CPT | Performed by: PHYSICIAN ASSISTANT

## 2023-04-20 PROCEDURE — G8399 PT W/DXA RESULTS DOCUMENT: HCPCS | Performed by: PHYSICIAN ASSISTANT

## 2023-04-20 PROCEDURE — 1101F PT FALLS ASSESS-DOCD LE1/YR: CPT | Performed by: PHYSICIAN ASSISTANT

## 2023-04-20 PROCEDURE — G9899 SCRN MAM PERF RSLTS DOC: HCPCS | Performed by: PHYSICIAN ASSISTANT

## 2023-04-20 RX ORDER — LABETALOL 200 MG/1
200 TABLET, FILM COATED ORAL 2 TIMES DAILY
Qty: 60 TABLET | Refills: 0 | Status: SHIPPED | OUTPATIENT
Start: 2023-04-20

## 2023-04-20 RX ORDER — GUANFACINE 2 MG/1
2 TABLET, EXTENDED RELEASE ORAL
COMMUNITY

## 2023-04-20 RX ORDER — FUROSEMIDE 20 MG/1
20 TABLET ORAL DAILY
COMMUNITY

## 2023-04-20 NOTE — PROGRESS NOTES
YES Answers must have Comments  1. \"Have you been to the ER, urgent care clinic since your last visit? Hospitalized since your last visit? \"    [x] YES Where [facility] and Reason for visit Elevated BP  [] NO       2. Have you seen or consulted any other health care providers outside of [address] [place] [address] since your last visit?     [] YES {When GDJRB:30878}  [x] NO       3. For patients aged 39-70: Have you had a colorectal cancer screening such as a colonoscopy/FIT/Cologuard? Nurse/CMA to request records if not in chart   [x] YES {When Where type:83245}  [] NO   [] NA, based on age    If the patient is female:      4. For female patients aged 41-77: Lexie Martinez you had a mammogram in the last two years?  Nurse/CMA to request records if not in chart   [x] YES {When IKEPX:99464}  [] NO   [] NA, based on age    11. For female patients aged 21-65: Lexie Martinez you had a pap smear?   Nurse/CMA to request records if not in chart   [] YES {When HYNQJ:51781}  [x] NO  [] NA, based on age

## 2023-04-20 NOTE — PROGRESS NOTES
Arash Lewis is a 67 y.o. female who presents to the office today with the following:  Chief Complaint   Patient presents with    Transitions Of Care     Was at [facility] for HTN and elevated BP       HPI  Pt with pertinent PMH of HTN, CAD, PVD, CVA, thromboembolism presents s/p ER visit for hypertensive emergency. Prior to this visit pt was c/o low BPs. In fact, her previous two visits this year had BPs in 80-90s/50s. She had sent a log via [employer] with a range of  systolic and 37-54P diastolic. This was after holding her amlodipine. We then decided to gradually taper her labetolol. I had asked she report readings a few days later but never heard from pt until now. Her  says she has the lower readings in her \"stroke arm\" than the other. Her readings from home show all in /, most of the readings are below 374R for systolic with only two higher in 150-160 range, as well as only two high diastolic of 334-218 when they mostly are 80s or lower. She has one reading in left arm recently that shows 172/86. Pt states she does feel weak/lightheaded/dizzy at times, she believes when her BP is low but her  says they don't monitor it \"that closely. She has gotten so dizzy she has fallen (prior to ER visit, was okay, just knee abrasion about 2 weeks ago per , did not hit head or LOC). Dr Arabella Taylor is pt's current cardiology with [facility]. She is also monitored by Nephrology for  ROS  See HPI.     Past Medical History:   Diagnosis Date    Adverse effect of anesthesia     sleep apnea does not use cpap machine      Arthritis     Spine, DDD    CAD (coronary artery disease) 8 yr ago    Carotid stent on right, Left is 100% Occluded, sees Dr. Colonel Ross, PVD    Colon polyps     Depression     GERD (gastroesophageal reflux disease)     Hematuria     High cholesterol     Hypertension     Osteopenia     Psychiatric disorder     PVD (peripheral vascular disease) with claudication ([address] Renal insufficiency     Sleep apnea     Dx and treated with surgery without improvement, does not use CPAP    Stroke ([facility] [address] 2004    right leg weakness, right arm paralysis (treated in [place])    Thromboembolus [facility])     Visit for monitoring Plavix therapy        Past Surgical History:   Procedure Laterality Date    COLONOSCOPY N/A 2020    COLONOSCOPY performed by Blanka Ayoub MD at [facility] 33    HX COLONOSCOPY      HX COLONOSCOPY  2020    hyperplastic polyp    HX GYN  1971    BTL    HX HEENT      Tonsilectomy    [place] UNLISTED PROCEDURE VASCULAR SURGERY Left     Carotid stent, Dr. Rohit Hand, left Carotid 100% blocked    [place] UNLISTED PROCEDURE VASCULAR SURGERY Right 2019    vascular leg/groin surgery       Allergies   Allergen Reactions    Tylenol [Acetaminophen] Unknown (comments)     States cannot take this has problems cannot remember reaction       Ambien [Zolpidem] Other (comments)     Sleep walking    Lunesta [Eszopiclone] Other (comments)     crazy    Codeine Nausea Only       Current Outpatient Medications   Medication Sig    aspirin (ASPIR-81 PO) Take  by mouth.    guanFACINE ER (INTUNIV) 2 mg ER tablet Take 1 Tablet by mouth nightly. furosemide (LASIX) 20 mg tablet Take 1 Tablet by mouth daily. FLUoxetine (PROzac) 40 mg capsule TAKE 1 CAPSULE EVERY DAY    atorvastatin (LIPITOR) 40 mg tablet TAKE 1 TABLET EVERY NIGHT    clopidogreL (PLAVIX) 75 mg tab Take 1 Tablet by mouth daily. labetaloL (NORMODYNE) 300 mg tablet Take 1 Tablet by mouth two (2) times a day. calcium-cholecalciferol, d3, 600-125 mg-unit tab Take  by mouth. No current facility-administered medications for this visit.        Social History     Socioeconomic History    Marital status:    Tobacco Use    Smoking status: Former     Packs/day: 2.00     Years: 35.00     Pack years: 70.00     Types: Cigarettes     Quit date: 2003     Years since quittin.7    Smokeless tobacco: Never   Vaping Use    Vaping Use: Never used   Substance and Sexual Activity    Alcohol use: Yes     Alcohol/week: 2.0 standard drinks     Types: 2 Cans of beer per week     Comment: last drink one month ago per pt and spouse on 19    Drug use: Never    Sexual activity: Not Currently   Social History Narrative    ** Merged History Encounter **          Social Determinants of Health     Financial Resource Strain: Low Risk     Difficulty of Paying Living Expenses: Not hard at all   Food Insecurity: No Food Insecurity    Worried About Running Out of Food in the Last Year: Never true    [address] in the Last Year: Never true   Transportation Needs: No Transportation Needs    Lack of Transportation (Medical): No    Lack of Transportation (Non-Medical): No   Housing Stability: Unknown    Unable to Pay for Housing in the Last Year: No    Unstable Housing in the Last Year: No       Family History   Problem Relation Age of Onset    Heart Disease Mother         unsure specifics, just knows of stroke    Other Mother         PAD    COPD Mother     Cancer Maternal Grandmother     COPD Father     Other Father          GSW    Other Sister         PAD    COPD Sister     Cancer Sister         colon at age 45    No Known Problems Child          Physical Exam:  Visit Vitals  BP (!) 92/58 (BP 1 Location: Left upper arm, BP Patient Position: Sitting, BP Cuff Size: Adult)   Pulse 71   Temp 97.7 °F (36.5 °C) (Temporal)   Resp 14   Ht 4' 11\" (1.499 m)   Wt 181 lb 6.4 oz (82.3 kg)   SpO2 98%   BMI 36.64 kg/m²     Physical Exam    Assessment/Plan:  {No Diagnosis Found}      Seek care in interim for any new sxs or other concerns. Pt verbalizes understanding and agrees with the plan.     Mikki Delgado PA-C

## 2023-05-04 NOTE — DISCHARGE INSTRUCTIONS
Patient Education        Femoral Endarterectomy: What to Expect at [address] [place] [address] will have some pain from the cut (incision) the doctor made. This usually gets better after a couple of days. Your doctor will give you pain medicine for this. Your leg may be swollen at first. This may last 2 to 3 months. You will have stitches or staples in the incision. If you have stitches, they may dissolve on their own. Or your doctor may take them out 7 to 14 days after your surgery. After surgery, blood may flow better throughout your leg, which can decrease leg pain, numbness, and cramping. You may be able to walk longer distances without leg pain. This care sheet gives you a general idea about how long it will take for you to recover. But each person recovers at a different pace. Follow the steps below to get better as quickly as possible. How can you care for yourself at home? Activity    · Rest when you feel tired. Getting enough sleep will help you recover.     · Try to walk every day or as often as your doctor tells you. Start by walking a little more than you did the day before. Bit by bit, increase the amount you walk. Walking boosts blood flow and helps prevent pneumonia and constipation.     · Avoid strenuous activities, such as bicycle riding, jogging, weight lifting, or aerobic exercise, until your doctor says it is okay.     · Ask your doctor when you can drive again.     · You will probably need to take off 1 to 4 weeks from work. It depends on the type of work you do and how you feel.     · You may shower as usual. Do not take a bath for the first 2 weeks, or until your doctor tells you it is okay. Diet    · You can eat your normal diet. If your stomach is upset, try bland, low-fat foods like plain rice, broiled chicken, toast, and yogurt.     · Drink plenty of fluids (unless your doctor tells you not to).     · You may notice that your bowel movements are not regular right after your surgery. This is common. You may want to take a fiber supplement every day. If you have not had a bowel movement after a couple of days, ask your doctor about taking a mild laxative. Medicines    · Your doctor will tell you if and when you can restart your medicines. He or she will also give you instructions about taking any new medicines.     · If you take blood thinners, such as warfarin (Coumadin), clopidogrel (Plavix), or aspirin, be sure to talk to your doctor. He or she will tell you if and when to start taking those medicines again. Make sure that you understand exactly what your doctor wants you to do.     · Be safe with medicines. Take your medicines exactly as prescribed. Call your doctor if you think you are having a problem with your medicine.     · Take pain medicines exactly as directed. ? If the doctor gave you a prescription medicine for pain, take it as prescribed. ? If you are not taking a prescription pain medicine, ask your doctor if you can take an over-the-counter medicine.     · If you think your pain medicine is making you sick to your stomach:  ? Take your medicine after meals (unless your doctor has told you not to). ? Ask your doctor for a different pain medicine.     · If your doctor prescribed antibiotics, take them as directed. Do not stop taking them just because you feel better. You need to take the full course of antibiotics.     · Your doctor may prescribe a blood thinner when you go home. This helps prevent blood clots. Be sure you get instructions about how to take your medicine safely. Blood thinners can cause serious bleeding problems. Incision care    · If you have strips of tape on the cut (incision) the doctor made, leave the tape on for a week or until it falls off. Or follow your doctor's instructions for removing the tape.     · Wash the area daily with warm, soapy water, and pat it dry. Don't use hydrogen peroxide or alcohol, which can slow healing.  You may cover the area with a gauze bandage if it weeps or rubs against clothing. Change the bandage every day.     · Keep the area clean and dry. Follow-up care is a key part of your treatment and safety. Be sure to make and go to all appointments, and call your doctor if you are having problems. It's also a good idea to know your test results and keep a list of the medicines you take. When should you call for help? Call 911 anytime you think you may need emergency care. For example, call if:    · You passed out (lost consciousness).     · You have trouble breathing.    Call your doctor now or seek immediate medical care if:    · You have severe pain in your leg, or it becomes cold, pale, blue, tingly, or numb.     · You have pain that does not get better after you take pain medicine.     · You have loose stitches, or your incision comes open.     · You are bleeding a lot from the incision.     · You have signs of infection, such as:  ? Increased pain, swelling, warmth, or redness. ? Red streaks leading from the incision. ? Pus draining from the incision. ? A fever.     · You are sick to your stomach or cannot keep fluids down.    Watch closely for any changes in your health, and be sure to contact your doctor if:    · You do not get better as expected. Where can you learn more? Go to http://diallo-mendez.info/. Enter P886 in the search box to learn more about \"Femoral Endarterectomy: What to Expect at Home. \"  Current as of: July 22, 2018  Content Version: 11.9  © 2454-7557 [employer]. Care instructions adapted under license by [employer] (which disclaims liability or warranty for this information). If you have questions about a medical condition or this instruction, always ask your healthcare professional. [address] any warranty or liability for your use of this information. 24

## 2023-05-16 DIAGNOSIS — I10 ESSENTIAL (PRIMARY) HYPERTENSION: ICD-10-CM

## 2023-05-16 RX ORDER — LABETALOL 200 MG/1
TABLET, FILM COATED ORAL
Qty: 60 TABLET | Refills: 0 | Status: SHIPPED | OUTPATIENT
Start: 2023-05-16

## 2023-05-16 NOTE — TELEPHONE ENCOUNTER
Requested Prescriptions     Pending Prescriptions Disp Refills    labetalol (NORMODYNE) 200 MG tablet [Pharmacy Med Name: labetalol 200 mg tablet] 60 tablet 0     Sig: TAKE ONE TABLET BY MOUTH TWICE DAILY     LOV 4/20/23

## 2023-05-24 ENCOUNTER — OFFICE VISIT (OUTPATIENT)
Dept: FAMILY MEDICINE CLINIC | Age: 73
End: 2023-05-24
Payer: MEDICARE

## 2023-05-24 VITALS
RESPIRATION RATE: 14 BRPM | TEMPERATURE: 97.4 F | HEIGHT: 59 IN | BODY MASS INDEX: 35.48 KG/M2 | SYSTOLIC BLOOD PRESSURE: 90 MMHG | WEIGHT: 176 LBS | OXYGEN SATURATION: 97 % | HEART RATE: 72 BPM | DIASTOLIC BLOOD PRESSURE: 61 MMHG

## 2023-05-24 DIAGNOSIS — I10 ESSENTIAL HYPERTENSION: Primary | ICD-10-CM

## 2023-05-24 DIAGNOSIS — L71.9 ROSACEA: ICD-10-CM

## 2023-05-24 LAB
ALBUMIN SERPL-MCNC: 4 G/DL (ref 3.5–5)
ALBUMIN/GLOB SERPL: 1.3 (ref 1.1–2.2)
ALP SERPL-CCNC: 94 U/L (ref 45–117)
ALT SERPL-CCNC: 23 U/L (ref 12–78)
ANION GAP SERPL CALC-SCNC: 5 MMOL/L (ref 5–15)
AST SERPL-CCNC: 13 U/L (ref 15–37)
BILIRUB SERPL-MCNC: 0.4 MG/DL (ref 0.2–1)
BUN SERPL-MCNC: 13 MG/DL (ref 6–20)
BUN/CREAT SERPL: 10 (ref 12–20)
CALCIUM SERPL-MCNC: 9.3 MG/DL (ref 8.5–10.1)
CHLORIDE SERPL-SCNC: 106 MMOL/L (ref 97–108)
CO2 SERPL-SCNC: 28 MMOL/L (ref 21–32)
CREAT SERPL-MCNC: 1.33 MG/DL (ref 0.55–1.02)
GLOBULIN SER CALC-MCNC: 3.2 G/DL (ref 2–4)
GLUCOSE SERPL-MCNC: 96 MG/DL (ref 65–100)
POTASSIUM SERPL-SCNC: 4.3 MMOL/L (ref 3.5–5.1)
PROT SERPL-MCNC: 7.2 G/DL (ref 6.4–8.2)
SODIUM SERPL-SCNC: 139 MMOL/L (ref 136–145)

## 2023-05-24 PROCEDURE — 1123F ACP DISCUSS/DSCN MKR DOCD: CPT | Performed by: PHYSICIAN ASSISTANT

## 2023-05-24 PROCEDURE — 1090F PRES/ABSN URINE INCON ASSESS: CPT | Performed by: PHYSICIAN ASSISTANT

## 2023-05-24 PROCEDURE — 3074F SYST BP LT 130 MM HG: CPT | Performed by: PHYSICIAN ASSISTANT

## 2023-05-24 PROCEDURE — G8417 CALC BMI ABV UP PARAM F/U: HCPCS | Performed by: PHYSICIAN ASSISTANT

## 2023-05-24 PROCEDURE — 1036F TOBACCO NON-USER: CPT | Performed by: PHYSICIAN ASSISTANT

## 2023-05-24 PROCEDURE — 3017F COLORECTAL CA SCREEN DOC REV: CPT | Performed by: PHYSICIAN ASSISTANT

## 2023-05-24 PROCEDURE — 99214 OFFICE O/P EST MOD 30 MIN: CPT | Performed by: PHYSICIAN ASSISTANT

## 2023-05-24 PROCEDURE — G8399 PT W/DXA RESULTS DOCUMENT: HCPCS | Performed by: PHYSICIAN ASSISTANT

## 2023-05-24 PROCEDURE — 3078F DIAST BP <80 MM HG: CPT | Performed by: PHYSICIAN ASSISTANT

## 2023-05-24 PROCEDURE — 36415 COLL VENOUS BLD VENIPUNCTURE: CPT | Performed by: PHYSICIAN ASSISTANT

## 2023-05-24 PROCEDURE — G8427 DOCREV CUR MEDS BY ELIG CLIN: HCPCS | Performed by: PHYSICIAN ASSISTANT

## 2023-05-24 RX ORDER — METRONIDAZOLE 7.5 MG/G
GEL TOPICAL
Qty: 45 G | Refills: 0 | Status: SHIPPED | OUTPATIENT
Start: 2023-05-24

## 2023-05-24 RX ORDER — FUROSEMIDE 20 MG/1
20 TABLET ORAL DAILY
COMMUNITY

## 2023-05-24 RX ORDER — GUANFACINE 2 MG/1
2 TABLET ORAL
COMMUNITY

## 2023-05-24 SDOH — ECONOMIC STABILITY: HOUSING INSECURITY
IN THE LAST 12 MONTHS, WAS THERE A TIME WHEN YOU DID NOT HAVE A STEADY PLACE TO SLEEP OR SLEPT IN A SHELTER (INCLUDING NOW)?: NO

## 2023-05-24 SDOH — ECONOMIC STABILITY: INCOME INSECURITY: HOW HARD IS IT FOR YOU TO PAY FOR THE VERY BASICS LIKE FOOD, HOUSING, MEDICAL CARE, AND HEATING?: NOT HARD AT ALL

## 2023-05-24 SDOH — ECONOMIC STABILITY: FOOD INSECURITY: WITHIN THE PAST 12 MONTHS, THE FOOD YOU BOUGHT JUST DIDN'T LAST AND YOU DIDN'T HAVE MONEY TO GET MORE.: NEVER TRUE

## 2023-05-24 SDOH — ECONOMIC STABILITY: FOOD INSECURITY: WITHIN THE PAST 12 MONTHS, YOU WORRIED THAT YOUR FOOD WOULD RUN OUT BEFORE YOU GOT MONEY TO BUY MORE.: NEVER TRUE

## 2023-05-24 ASSESSMENT — PATIENT HEALTH QUESTIONNAIRE - PHQ9
SUM OF ALL RESPONSES TO PHQ9 QUESTIONS 1 & 2: 0
10. IF YOU CHECKED OFF ANY PROBLEMS, HOW DIFFICULT HAVE THESE PROBLEMS MADE IT FOR YOU TO DO YOUR WORK, TAKE CARE OF THINGS AT HOME, OR GET ALONG WITH OTHER PEOPLE: 0
SUM OF ALL RESPONSES TO PHQ QUESTIONS 1-9: 0
2. FEELING DOWN, DEPRESSED OR HOPELESS: 0
SUM OF ALL RESPONSES TO PHQ QUESTIONS 1-9: 0
1. LITTLE INTEREST OR PLEASURE IN DOING THINGS: 0
5. POOR APPETITE OR OVEREATING: 0
3. TROUBLE FALLING OR STAYING ASLEEP: 0
4. FEELING TIRED OR HAVING LITTLE ENERGY: 0
SUM OF ALL RESPONSES TO PHQ QUESTIONS 1-9: 0
6. FEELING BAD ABOUT YOURSELF - OR THAT YOU ARE A FAILURE OR HAVE LET YOURSELF OR YOUR FAMILY DOWN: 0
9. THOUGHTS THAT YOU WOULD BE BETTER OFF DEAD, OR OF HURTING YOURSELF: 0
SUM OF ALL RESPONSES TO PHQ QUESTIONS 1-9: 0
7. TROUBLE CONCENTRATING ON THINGS, SUCH AS READING THE NEWSPAPER OR WATCHING TELEVISION: 0
8. MOVING OR SPEAKING SO SLOWLY THAT OTHER PEOPLE COULD HAVE NOTICED. OR THE OPPOSITE, BEING SO FIGETY OR RESTLESS THAT YOU HAVE BEEN MOVING AROUND A LOT MORE THAN USUAL: 0

## 2023-05-24 NOTE — PROGRESS NOTES
Successful venipuncture in patient's left ac X 1 sticks. The patient tolerated this procedure w/o c/o pain or discomfort. YES Answers must have Comments  1. \"Have you been to the ER, urgent care clinic since your last visit? Hospitalized since your last visit? \"    [x] YES Where [place] for high BP  [] NO       2. Santo Rosales you seen or consulted any other health care providers outside of [address] [place] [address] since your last visit?     [] YES   [x] NO       3. For patients aged 39-70: Have you had a colorectal cancer screening such as a colonoscopy/FIT/Cologuard? Nurse/CMA to request records if not in chart   [x] YES   [] NO   [] NA, based on age    If the patient is female:      4. For female patients aged 41-77: Santo Rosales you had a mammogram in the last two years?  Nurse/CMA to request records if not in chart   [x] YES   [] NO   [] NA, based on age    11. For female patients aged 21-65: Santo Rosales you had a pap smear?   Nurse/CMA to request records if not in chart   [] YES   [] NO  [x] NA, based on age
Dx and treated with surgery without improvement, does not use CPAP    Stroke ([address] 06/17/2004    right leg weakness, right arm paralysis (treated in [place])    Thromboembolus [facility])     Visit for monitoring Plavix therapy        Past Surgical History:   Procedure Laterality Date    COLONOSCOPY  01/22/2020    hyperplastic polyp    COLONOSCOPY      COLONOSCOPY N/A 1/22/2020    COLONOSCOPY performed by Margoth Tiwari MD at [address] [place] [address] [address] Right 07/2019    vascular leg/groin surgery    VASCULAR SURGERY Left     Carotid stent, Dr. Rowan Garcia, left Carotid 100% blocked       Allergies   Allergen Reactions    Acetaminophen      Other reaction(s): Unknown (comments)  States cannot take this has problems cannot remember reaction       Eszopiclone Other (See Comments)     crazy    Zolpidem Other (See Comments)     Sleep walking    Codeine Nausea Only       Current Outpatient Medications   Medication Sig Dispense Refill    Aspirin (ANNETTA LOW DOSE PO) Take 81 mg by mouth daily      furosemide (LASIX) 20 MG tablet Take 1 tablet by mouth daily      guanFACINE HCl 2 MG TABS Take 2 mg by mouth nightly      Loratadine-Pseudoephedrine (CLARITIN-D 24 HOUR PO) Take by mouth      metroNIDAZOLE (METROGEL) 0.75 % gel Apply topically 2 times daily. 45 g 0    labetalol (NORMODYNE) 200 MG tablet TAKE ONE TABLET BY MOUTH TWICE DAILY 60 tablet 0    atorvastatin (LIPITOR) 40 MG tablet Take 1 tablet by mouth nightly      Calcium Carbonate-Vitamin D 600-3. 125 MG-MCG TABS Take by mouth      clopidogrel (PLAVIX) 75 MG tablet Take 1 tablet by mouth daily      FLUoxetine (PROZAC) 40 MG capsule Take 1 capsule by mouth daily       No current facility-administered medications for this visit.        Social History     Socioeconomic History    Marital status:      Spouse name: None    Number of children: None    Years of education: None    Highest education level: None   Tobacco Use    Smoking

## 2023-06-07 RX ORDER — GUANFACINE 2 MG/1
TABLET ORAL
Qty: 90 TABLET | Refills: 1 | Status: SHIPPED | OUTPATIENT
Start: 2023-06-07

## 2023-06-07 NOTE — TELEPHONE ENCOUNTER
Requested Prescriptions     Pending Prescriptions Disp Refills    guanFACINE HCl 2 MG TABS [Pharmacy Med Name: GUANFACINE HYDROCHLORIDE 2 MG Tablet] 90 tablet 0     Sig: TAKE 1 TABLET EVERY NIGHT     Last seen:  5/24/23

## 2023-06-20 DIAGNOSIS — I10 ESSENTIAL (PRIMARY) HYPERTENSION: ICD-10-CM

## 2023-06-20 RX ORDER — LABETALOL 200 MG/1
200 TABLET, FILM COATED ORAL 2 TIMES DAILY
Qty: 60 TABLET | Refills: 5 | Status: SHIPPED | OUTPATIENT
Start: 2023-06-20

## 2023-08-07 ENCOUNTER — OFFICE VISIT (OUTPATIENT)
Dept: FAMILY MEDICINE CLINIC | Age: 73
End: 2023-08-07
Payer: MEDICARE

## 2023-08-07 VITALS
DIASTOLIC BLOOD PRESSURE: 70 MMHG | TEMPERATURE: 97 F | OXYGEN SATURATION: 98 % | BODY MASS INDEX: 35.28 KG/M2 | HEART RATE: 66 BPM | HEIGHT: 59 IN | SYSTOLIC BLOOD PRESSURE: 110 MMHG | RESPIRATION RATE: 18 BRPM | WEIGHT: 175 LBS

## 2023-08-07 DIAGNOSIS — I69.351 HEMIPARESIS OF RIGHT DOMINANT SIDE AS LATE EFFECT OF CEREBRAL INFARCTION (HCC): ICD-10-CM

## 2023-08-07 DIAGNOSIS — Z00.00 MEDICARE ANNUAL WELLNESS VISIT, SUBSEQUENT: Primary | ICD-10-CM

## 2023-08-07 DIAGNOSIS — R41.3 MEMORY LOSS OR IMPAIRMENT: ICD-10-CM

## 2023-08-07 DIAGNOSIS — Z86.73 HISTORY OF CVA (CEREBROVASCULAR ACCIDENT): ICD-10-CM

## 2023-08-07 DIAGNOSIS — R45.1 AGITATION: ICD-10-CM

## 2023-08-07 DIAGNOSIS — R51.9 NEW ONSET OF HEADACHES AFTER AGE 50: ICD-10-CM

## 2023-08-07 DIAGNOSIS — L71.9 ROSACEA: ICD-10-CM

## 2023-08-07 DIAGNOSIS — L98.9 SKIN LESION: ICD-10-CM

## 2023-08-07 DIAGNOSIS — R41.3 SHORT-TERM MEMORY LOSS: ICD-10-CM

## 2023-08-07 LAB
BILIRUBIN, URINE, POC: NEGATIVE
BLOOD URINE, POC: NEGATIVE
GLUCOSE URINE, POC: NEGATIVE
KETONES, URINE, POC: NEGATIVE
LEUKOCYTE ESTERASE, URINE, POC: ABNORMAL
NITRITE, URINE, POC: NEGATIVE
PH, URINE, POC: 7 (ref 4.6–8)
PROTEIN,URINE, POC: NEGATIVE
SPECIFIC GRAVITY, URINE, POC: 1.01 (ref 1–1.03)
URINALYSIS CLARITY, POC: CLEAR
URINALYSIS COLOR, POC: YELLOW
UROBILINOGEN, POC: NORMAL

## 2023-08-07 PROCEDURE — 99214 OFFICE O/P EST MOD 30 MIN: CPT | Performed by: PHYSICIAN ASSISTANT

## 2023-08-07 PROCEDURE — G0439 PPPS, SUBSEQ VISIT: HCPCS | Performed by: PHYSICIAN ASSISTANT

## 2023-08-07 PROCEDURE — 3078F DIAST BP <80 MM HG: CPT | Performed by: PHYSICIAN ASSISTANT

## 2023-08-07 PROCEDURE — 3017F COLORECTAL CA SCREEN DOC REV: CPT | Performed by: PHYSICIAN ASSISTANT

## 2023-08-07 PROCEDURE — G8417 CALC BMI ABV UP PARAM F/U: HCPCS | Performed by: PHYSICIAN ASSISTANT

## 2023-08-07 PROCEDURE — G8427 DOCREV CUR MEDS BY ELIG CLIN: HCPCS | Performed by: PHYSICIAN ASSISTANT

## 2023-08-07 PROCEDURE — G8399 PT W/DXA RESULTS DOCUMENT: HCPCS | Performed by: PHYSICIAN ASSISTANT

## 2023-08-07 PROCEDURE — 1123F ACP DISCUSS/DSCN MKR DOCD: CPT | Performed by: PHYSICIAN ASSISTANT

## 2023-08-07 PROCEDURE — 3074F SYST BP LT 130 MM HG: CPT | Performed by: PHYSICIAN ASSISTANT

## 2023-08-07 PROCEDURE — 1036F TOBACCO NON-USER: CPT | Performed by: PHYSICIAN ASSISTANT

## 2023-08-07 PROCEDURE — 81003 URINALYSIS AUTO W/O SCOPE: CPT | Performed by: PHYSICIAN ASSISTANT

## 2023-08-07 PROCEDURE — 1090F PRES/ABSN URINE INCON ASSESS: CPT | Performed by: PHYSICIAN ASSISTANT

## 2023-08-07 RX ORDER — BRIMONIDINE 5 MG/G
GEL TOPICAL
Qty: 30 G | Refills: 0 | Status: SHIPPED | OUTPATIENT
Start: 2023-08-07

## 2023-08-07 ASSESSMENT — ANXIETY QUESTIONNAIRES
7. FEELING AFRAID AS IF SOMETHING AWFUL MIGHT HAPPEN: 0
IF YOU CHECKED OFF ANY PROBLEMS ON THIS QUESTIONNAIRE, HOW DIFFICULT HAVE THESE PROBLEMS MADE IT FOR YOU TO DO YOUR WORK, TAKE CARE OF THINGS AT HOME, OR GET ALONG WITH OTHER PEOPLE: NOT DIFFICULT AT ALL
6. BECOMING EASILY ANNOYED OR IRRITABLE: 0
1. FEELING NERVOUS, ANXIOUS, OR ON EDGE: 0
5. BEING SO RESTLESS THAT IT IS HARD TO SIT STILL: 0
2. NOT BEING ABLE TO STOP OR CONTROL WORRYING: 0
GAD7 TOTAL SCORE: 0
4. TROUBLE RELAXING: 0
3. WORRYING TOO MUCH ABOUT DIFFERENT THINGS: 0

## 2023-08-07 ASSESSMENT — LIFESTYLE VARIABLES
HOW MANY STANDARD DRINKS CONTAINING ALCOHOL DO YOU HAVE ON A TYPICAL DAY: PATIENT DOES NOT DRINK
HOW OFTEN DO YOU HAVE A DRINK CONTAINING ALCOHOL: NEVER

## 2023-08-07 ASSESSMENT — PATIENT HEALTH QUESTIONNAIRE - PHQ9
8. MOVING OR SPEAKING SO SLOWLY THAT OTHER PEOPLE COULD HAVE NOTICED. OR THE OPPOSITE, BEING SO FIGETY OR RESTLESS THAT YOU HAVE BEEN MOVING AROUND A LOT MORE THAN USUAL: 0
6. FEELING BAD ABOUT YOURSELF - OR THAT YOU ARE A FAILURE OR HAVE LET YOURSELF OR YOUR FAMILY DOWN: 0
3. TROUBLE FALLING OR STAYING ASLEEP: 0
SUM OF ALL RESPONSES TO PHQ QUESTIONS 1-9: 0
SUM OF ALL RESPONSES TO PHQ QUESTIONS 1-9: 0
7. TROUBLE CONCENTRATING ON THINGS, SUCH AS READING THE NEWSPAPER OR WATCHING TELEVISION: 0
SUM OF ALL RESPONSES TO PHQ QUESTIONS 1-9: 0
1. LITTLE INTEREST OR PLEASURE IN DOING THINGS: 0
2. FEELING DOWN, DEPRESSED OR HOPELESS: 0
4. FEELING TIRED OR HAVING LITTLE ENERGY: 0
5. POOR APPETITE OR OVEREATING: 0
SUM OF ALL RESPONSES TO PHQ9 QUESTIONS 1 & 2: 0
9. THOUGHTS THAT YOU WOULD BE BETTER OFF DEAD, OR OF HURTING YOURSELF: 0
SUM OF ALL RESPONSES TO PHQ QUESTIONS 1-9: 0
10. IF YOU CHECKED OFF ANY PROBLEMS, HOW DIFFICULT HAVE THESE PROBLEMS MADE IT FOR YOU TO DO YOUR WORK, TAKE CARE OF THINGS AT HOME, OR GET ALONG WITH OTHER PEOPLE: 0

## 2023-08-08 LAB
ALBUMIN SERPL-MCNC: 3.9 G/DL (ref 3.5–5)
ALBUMIN/GLOB SERPL: 1.2 (ref 1.1–2.2)
ALP SERPL-CCNC: 95 U/L (ref 45–117)
ALT SERPL-CCNC: 20 U/L (ref 12–78)
ANION GAP SERPL CALC-SCNC: 6 MMOL/L (ref 5–15)
AST SERPL-CCNC: 11 U/L (ref 15–37)
BASOPHILS # BLD: 0.1 K/UL (ref 0–0.1)
BASOPHILS NFR BLD: 1 % (ref 0–1)
BILIRUB SERPL-MCNC: 0.4 MG/DL (ref 0.2–1)
BUN SERPL-MCNC: 14 MG/DL (ref 6–20)
BUN/CREAT SERPL: 11 (ref 12–20)
CALCIUM SERPL-MCNC: 9.5 MG/DL (ref 8.5–10.1)
CHLORIDE SERPL-SCNC: 106 MMOL/L (ref 97–108)
CO2 SERPL-SCNC: 27 MMOL/L (ref 21–32)
CREAT SERPL-MCNC: 1.29 MG/DL (ref 0.55–1.02)
DIFFERENTIAL METHOD BLD: NORMAL
EOSINOPHIL # BLD: 0.2 K/UL (ref 0–0.4)
EOSINOPHIL NFR BLD: 2 % (ref 0–7)
ERYTHROCYTE [DISTWIDTH] IN BLOOD BY AUTOMATED COUNT: 13.7 % (ref 11.5–14.5)
GLOBULIN SER CALC-MCNC: 3.2 G/DL (ref 2–4)
GLUCOSE SERPL-MCNC: 101 MG/DL (ref 65–100)
HCT VFR BLD AUTO: 42.8 % (ref 35–47)
HGB BLD-MCNC: 13.2 G/DL (ref 11.5–16)
IMM GRANULOCYTES # BLD AUTO: 0 K/UL (ref 0–0.04)
IMM GRANULOCYTES NFR BLD AUTO: 0 % (ref 0–0.5)
LYMPHOCYTES # BLD: 1.5 K/UL (ref 0.8–3.5)
LYMPHOCYTES NFR BLD: 20 % (ref 12–49)
MCH RBC QN AUTO: 28.6 PG (ref 26–34)
MCHC RBC AUTO-ENTMCNC: 30.8 G/DL (ref 30–36.5)
MCV RBC AUTO: 92.8 FL (ref 80–99)
MONOCYTES # BLD: 0.7 K/UL (ref 0–1)
MONOCYTES NFR BLD: 9 % (ref 5–13)
NEUTS SEG # BLD: 5.1 K/UL (ref 1.8–8)
NEUTS SEG NFR BLD: 68 % (ref 32–75)
NRBC # BLD: 0 K/UL (ref 0–0.01)
NRBC BLD-RTO: 0 PER 100 WBC
PLATELET # BLD AUTO: 348 K/UL (ref 150–400)
PMV BLD AUTO: 11.1 FL (ref 8.9–12.9)
POTASSIUM SERPL-SCNC: 4.8 MMOL/L (ref 3.5–5.1)
PROT SERPL-MCNC: 7.1 G/DL (ref 6.4–8.2)
RBC # BLD AUTO: 4.61 M/UL (ref 3.8–5.2)
SODIUM SERPL-SCNC: 139 MMOL/L (ref 136–145)
WBC # BLD AUTO: 7.5 K/UL (ref 3.6–11)

## 2023-08-09 LAB
TSH SERPL DL<=0.05 MIU/L-ACNC: 2.45 UIU/ML (ref 0.36–3.74)
VIT B12 SERPL-MCNC: 204 PG/ML (ref 193–986)

## 2023-08-28 ENCOUNTER — OFFICE VISIT (OUTPATIENT)
Dept: FAMILY MEDICINE CLINIC | Age: 73
End: 2023-08-28
Payer: MEDICARE

## 2023-08-28 VITALS
OXYGEN SATURATION: 98 % | HEIGHT: 59 IN | HEART RATE: 72 BPM | DIASTOLIC BLOOD PRESSURE: 48 MMHG | WEIGHT: 175.8 LBS | RESPIRATION RATE: 14 BRPM | BODY MASS INDEX: 35.44 KG/M2 | SYSTOLIC BLOOD PRESSURE: 78 MMHG | TEMPERATURE: 98 F

## 2023-08-28 DIAGNOSIS — I10 ESSENTIAL HYPERTENSION: Primary | ICD-10-CM

## 2023-08-28 DIAGNOSIS — I95.9 HYPOTENSION, UNSPECIFIED HYPOTENSION TYPE: ICD-10-CM

## 2023-08-28 DIAGNOSIS — I73.9 PAD (PERIPHERAL ARTERY DISEASE) (HCC): ICD-10-CM

## 2023-08-28 DIAGNOSIS — E78.5 HYPERLIPIDEMIA, UNSPECIFIED HYPERLIPIDEMIA TYPE: ICD-10-CM

## 2023-08-28 DIAGNOSIS — F32.A DEPRESSION, UNSPECIFIED DEPRESSION TYPE: ICD-10-CM

## 2023-08-28 DIAGNOSIS — N18.32 STAGE 3B CHRONIC KIDNEY DISEASE (HCC): ICD-10-CM

## 2023-08-28 PROCEDURE — 1123F ACP DISCUSS/DSCN MKR DOCD: CPT | Performed by: PHYSICIAN ASSISTANT

## 2023-08-28 PROCEDURE — 99214 OFFICE O/P EST MOD 30 MIN: CPT | Performed by: PHYSICIAN ASSISTANT

## 2023-08-28 PROCEDURE — 3017F COLORECTAL CA SCREEN DOC REV: CPT | Performed by: PHYSICIAN ASSISTANT

## 2023-08-28 PROCEDURE — 3078F DIAST BP <80 MM HG: CPT | Performed by: PHYSICIAN ASSISTANT

## 2023-08-28 PROCEDURE — G8417 CALC BMI ABV UP PARAM F/U: HCPCS | Performed by: PHYSICIAN ASSISTANT

## 2023-08-28 PROCEDURE — 3074F SYST BP LT 130 MM HG: CPT | Performed by: PHYSICIAN ASSISTANT

## 2023-08-28 PROCEDURE — G8399 PT W/DXA RESULTS DOCUMENT: HCPCS | Performed by: PHYSICIAN ASSISTANT

## 2023-08-28 PROCEDURE — 1090F PRES/ABSN URINE INCON ASSESS: CPT | Performed by: PHYSICIAN ASSISTANT

## 2023-08-28 PROCEDURE — 1036F TOBACCO NON-USER: CPT | Performed by: PHYSICIAN ASSISTANT

## 2023-08-28 PROCEDURE — G8427 DOCREV CUR MEDS BY ELIG CLIN: HCPCS | Performed by: PHYSICIAN ASSISTANT

## 2023-08-28 RX ORDER — FLUOXETINE HYDROCHLORIDE 40 MG/1
40 CAPSULE ORAL DAILY
Qty: 90 CAPSULE | Refills: 1 | Status: SHIPPED | OUTPATIENT
Start: 2023-08-28

## 2023-08-28 RX ORDER — ATORVASTATIN CALCIUM 40 MG/1
40 TABLET, FILM COATED ORAL NIGHTLY
Qty: 90 TABLET | Refills: 1 | Status: SHIPPED | OUTPATIENT
Start: 2023-08-28

## 2023-08-28 ASSESSMENT — PATIENT HEALTH QUESTIONNAIRE - PHQ9
SUM OF ALL RESPONSES TO PHQ QUESTIONS 1-9: 3
2. FEELING DOWN, DEPRESSED OR HOPELESS: 0
1. LITTLE INTEREST OR PLEASURE IN DOING THINGS: 0
7. TROUBLE CONCENTRATING ON THINGS, SUCH AS READING THE NEWSPAPER OR WATCHING TELEVISION: 1
9. THOUGHTS THAT YOU WOULD BE BETTER OFF DEAD, OR OF HURTING YOURSELF: 0
SUM OF ALL RESPONSES TO PHQ QUESTIONS 1-9: 3
5. POOR APPETITE OR OVEREATING: 0
3. TROUBLE FALLING OR STAYING ASLEEP: 1
4. FEELING TIRED OR HAVING LITTLE ENERGY: 1
SUM OF ALL RESPONSES TO PHQ9 QUESTIONS 1 & 2: 0
6. FEELING BAD ABOUT YOURSELF - OR THAT YOU ARE A FAILURE OR HAVE LET YOURSELF OR YOUR FAMILY DOWN: 0
SUM OF ALL RESPONSES TO PHQ QUESTIONS 1-9: 3
8. MOVING OR SPEAKING SO SLOWLY THAT OTHER PEOPLE COULD HAVE NOTICED. OR THE OPPOSITE, BEING SO FIGETY OR RESTLESS THAT YOU HAVE BEEN MOVING AROUND A LOT MORE THAN USUAL: 0
SUM OF ALL RESPONSES TO PHQ QUESTIONS 1-9: 3
10. IF YOU CHECKED OFF ANY PROBLEMS, HOW DIFFICULT HAVE THESE PROBLEMS MADE IT FOR YOU TO DO YOUR WORK, TAKE CARE OF THINGS AT HOME, OR GET ALONG WITH OTHER PEOPLE: 0

## 2023-08-28 NOTE — PROGRESS NOTES
Yvonne Castro is a 68 y.o. female who presents to the office today with the following:  Chief Complaint   Patient presents with    Hypertension     6 month follow up       HPI  HTN  No BP checks at home. Recent labs stable. Due to h/o severe PAD has had very different readings on her BP in each arm. In the past it has been determined to best be checked in \"non stroke\" arm, the left. Pt w/o any cardiac or neuro symptoms. Pt doing well on current statin therapy. Denies med SEs or myalgias. Is fasting today for labs. Lab Results   Component Value Date/Time    CHOL 179 02/28/2023 12:03 PM    HDL 61 02/28/2023 12:03 PM     Lab Results   Component Value Date/Time    ALT 20 08/07/2023 11:40 AM    AST 11 08/07/2023 11:40 AM     Doing well on fluoxetine for depression. Denies any active symptoms. Well managed on fluoxetine. They are not sure on refills and will check and let me know. Review of Systems    See HPI.     Past Medical History:   Diagnosis Date    Adverse effect of anesthesia     sleep apnea does not use cpap machine      Arthritis     Spine, DDD    CAD (coronary artery disease) 8 yr ago    Carotid stent on right, Left is 100% Occluded, sees Dr. Yudelka Rivas, PVD    Carotid artery stenosis 2/1/2015    Cerebrovascular disease 5/15/2010    Colon polyps     Depression     GERD (gastroesophageal reflux disease)     Hematuria     High cholesterol     Hypertension     Obesity 1/1/2015    Osteopenia     Psychiatric disorder     PVD (peripheral vascular disease) with claudication (HCC)     Renal insufficiency     Sleep apnea     Dx and treated with surgery without improvement, does not use CPAP    Stroke ([address]) 06/17/2004    right leg weakness, right arm paralysis (treated in [place])    Thromboembolus [facility])     Urinary incontinence 1/1/2022    Visit for monitoring Plavix therapy        Past Surgical History:   Procedure Laterality Date    COLONOSCOPY  01/22/2020    hyperplastic polyp    COLONOSCOPY

## 2023-08-30 ENCOUNTER — OFFICE VISIT (OUTPATIENT)
Dept: FAMILY MEDICINE CLINIC | Age: 73
End: 2023-08-30
Payer: MEDICARE

## 2023-08-30 VITALS
HEART RATE: 77 BPM | OXYGEN SATURATION: 98 % | DIASTOLIC BLOOD PRESSURE: 44 MMHG | BODY MASS INDEX: 35.6 KG/M2 | HEIGHT: 59 IN | SYSTOLIC BLOOD PRESSURE: 67 MMHG | TEMPERATURE: 97.8 F | RESPIRATION RATE: 14 BRPM | WEIGHT: 176.6 LBS

## 2023-08-30 DIAGNOSIS — I95.9 HYPOTENSION, UNSPECIFIED HYPOTENSION TYPE: Primary | ICD-10-CM

## 2023-08-30 DIAGNOSIS — I73.9 PAD (PERIPHERAL ARTERY DISEASE) (HCC): ICD-10-CM

## 2023-08-30 PROCEDURE — 1090F PRES/ABSN URINE INCON ASSESS: CPT | Performed by: PHYSICIAN ASSISTANT

## 2023-08-30 PROCEDURE — G8417 CALC BMI ABV UP PARAM F/U: HCPCS | Performed by: PHYSICIAN ASSISTANT

## 2023-08-30 PROCEDURE — 3074F SYST BP LT 130 MM HG: CPT | Performed by: PHYSICIAN ASSISTANT

## 2023-08-30 PROCEDURE — G8427 DOCREV CUR MEDS BY ELIG CLIN: HCPCS | Performed by: PHYSICIAN ASSISTANT

## 2023-08-30 PROCEDURE — 3078F DIAST BP <80 MM HG: CPT | Performed by: PHYSICIAN ASSISTANT

## 2023-08-30 PROCEDURE — 99213 OFFICE O/P EST LOW 20 MIN: CPT | Performed by: PHYSICIAN ASSISTANT

## 2023-08-30 PROCEDURE — G8399 PT W/DXA RESULTS DOCUMENT: HCPCS | Performed by: PHYSICIAN ASSISTANT

## 2023-08-30 PROCEDURE — 3017F COLORECTAL CA SCREEN DOC REV: CPT | Performed by: PHYSICIAN ASSISTANT

## 2023-08-30 PROCEDURE — 1123F ACP DISCUSS/DSCN MKR DOCD: CPT | Performed by: PHYSICIAN ASSISTANT

## 2023-08-30 PROCEDURE — 1036F TOBACCO NON-USER: CPT | Performed by: PHYSICIAN ASSISTANT

## 2023-08-30 RX ORDER — GUANFACINE 2 MG/1
1 TABLET ORAL NIGHTLY
Qty: 90 TABLET | Refills: 1 | Status: SHIPPED | OUTPATIENT
Start: 2023-08-30

## 2023-08-30 RX ORDER — CLOPIDOGREL BISULFATE 75 MG/1
75 TABLET ORAL DAILY
Qty: 90 TABLET | Refills: 1 | Status: SHIPPED | OUTPATIENT
Start: 2023-08-30

## 2023-08-30 ASSESSMENT — PATIENT HEALTH QUESTIONNAIRE - PHQ9
3. TROUBLE FALLING OR STAYING ASLEEP: 0
5. POOR APPETITE OR OVEREATING: 0
8. MOVING OR SPEAKING SO SLOWLY THAT OTHER PEOPLE COULD HAVE NOTICED. OR THE OPPOSITE, BEING SO FIGETY OR RESTLESS THAT YOU HAVE BEEN MOVING AROUND A LOT MORE THAN USUAL: 0
SUM OF ALL RESPONSES TO PHQ QUESTIONS 1-9: 0
10. IF YOU CHECKED OFF ANY PROBLEMS, HOW DIFFICULT HAVE THESE PROBLEMS MADE IT FOR YOU TO DO YOUR WORK, TAKE CARE OF THINGS AT HOME, OR GET ALONG WITH OTHER PEOPLE: 0
1. LITTLE INTEREST OR PLEASURE IN DOING THINGS: 0
SUM OF ALL RESPONSES TO PHQ QUESTIONS 1-9: 0
2. FEELING DOWN, DEPRESSED OR HOPELESS: 0
SUM OF ALL RESPONSES TO PHQ QUESTIONS 1-9: 0
SUM OF ALL RESPONSES TO PHQ QUESTIONS 1-9: 0
7. TROUBLE CONCENTRATING ON THINGS, SUCH AS READING THE NEWSPAPER OR WATCHING TELEVISION: 0
6. FEELING BAD ABOUT YOURSELF - OR THAT YOU ARE A FAILURE OR HAVE LET YOURSELF OR YOUR FAMILY DOWN: 0
4. FEELING TIRED OR HAVING LITTLE ENERGY: 0
SUM OF ALL RESPONSES TO PHQ9 QUESTIONS 1 & 2: 0
9. THOUGHTS THAT YOU WOULD BE BETTER OFF DEAD, OR OF HURTING YOURSELF: 0

## 2023-08-30 NOTE — PROGRESS NOTES
Angela Melton is a 68 y.o. female who presents to the office today with the following:  Chief Complaint   Patient presents with    Hypotension     2 day follow up       HPI  Pt here for BP recheck. Has severe PAD with differing readings in each arm (always told to check in left)  Also s/p CVA with right hemiparesis. Only checked BP once at home 149/67 since ov 2 d ago. Cannot seem to get accurate read still in office as I doubt with pt perfusing and feeling well that her BP is actually in 60s/40s right now. They have not yet made f/up with her vascular specialist to discuss. Pt otherwise reports feeling well with no new complaints. Still no UE symptoms. No swelling, erythema, pain. No cardiac or new neuro complaints otherwise. She has halved her labetolol dose from 2 d ago. Review of Systems    See HPI.     Past Medical History:   Diagnosis Date    Adverse effect of anesthesia     sleep apnea does not use cpap machine      Arthritis     Spine, DDD    CAD (coronary artery disease) 8 yr ago    Carotid stent on right, Left is 100% Occluded, sees Dr. Christoph Lynch, PVD    Carotid artery stenosis 2/1/2015    Cerebrovascular disease 5/15/2010    Colon polyps     Depression     GERD (gastroesophageal reflux disease)     Hematuria     High cholesterol     Hypertension     Obesity 1/1/2015    Osteopenia     Psychiatric disorder     PVD (peripheral vascular disease) with claudication (HCC)     Renal insufficiency     Sleep apnea     Dx and treated with surgery without improvement, does not use CPAP    Stroke ([address]) 06/17/2004    right leg weakness, right arm paralysis (treated in [place])    Thromboembolus [facility])     Urinary incontinence 1/1/2022    Visit for monitoring Plavix therapy        Past Surgical History:   Procedure Laterality Date    COLONOSCOPY  01/22/2020    hyperplastic polyp    COLONOSCOPY      COLONOSCOPY N/A 1/22/2020    COLONOSCOPY performed by Dilshad Claire MD at [address]

## 2023-09-18 DIAGNOSIS — R45.1 AGITATION: ICD-10-CM

## 2023-09-18 DIAGNOSIS — R51.9 NEW ONSET OF HEADACHES AFTER AGE 50: ICD-10-CM

## 2023-09-18 DIAGNOSIS — I69.351 HEMIPARESIS OF RIGHT DOMINANT SIDE AS LATE EFFECT OF CEREBRAL INFARCTION (HCC): ICD-10-CM

## 2023-09-18 DIAGNOSIS — R41.3 MEMORY LOSS OR IMPAIRMENT: ICD-10-CM

## 2023-09-18 DIAGNOSIS — R41.3 SHORT-TERM MEMORY LOSS: ICD-10-CM

## 2023-09-18 DIAGNOSIS — Z86.73 HISTORY OF CVA (CEREBROVASCULAR ACCIDENT): ICD-10-CM

## 2023-09-20 ENCOUNTER — PATIENT MESSAGE (OUTPATIENT)
Dept: FAMILY MEDICINE CLINIC | Age: 73
End: 2023-09-20

## 2023-09-20 RX ORDER — FUROSEMIDE 20 MG/1
20 TABLET ORAL DAILY
Qty: 60 TABLET | Refills: 2 | Status: SHIPPED | OUTPATIENT
Start: 2023-09-20

## 2023-09-20 NOTE — TELEPHONE ENCOUNTER
From: Vinicio King  To: Judy Enrique  Sent: 9/20/2023 11:54 AM EDT  Subject: Raynold Range, I need to refiooFurosemide 20MG TAB. Please send to [facility]. Thank you.

## 2023-11-15 ENCOUNTER — OFFICE VISIT (OUTPATIENT)
Dept: FAMILY MEDICINE CLINIC | Age: 73
End: 2023-11-15
Payer: MEDICARE

## 2023-11-15 ENCOUNTER — TELEPHONE (OUTPATIENT)
Dept: FAMILY MEDICINE CLINIC | Age: 73
End: 2023-11-15

## 2023-11-15 DIAGNOSIS — H60.391 ACUTE INFECTION OF EXTERNAL EAR, RIGHT: ICD-10-CM

## 2023-11-15 DIAGNOSIS — U07.1 COVID-19: Primary | ICD-10-CM

## 2023-11-15 DIAGNOSIS — R50.9 FEVER, UNSPECIFIED FEVER CAUSE: ICD-10-CM

## 2023-11-15 LAB
EXP DATE SOLUTION: ABNORMAL
EXP DATE SWAB: ABNORMAL
EXPIRATION DATE: ABNORMAL
LOT NUMBER POC: ABNORMAL
LOT NUMBER SOLUTION: ABNORMAL
LOT NUMBER SWAB: ABNORMAL
SARS-COV-2 RNA, POC: POSITIVE

## 2023-11-15 PROCEDURE — 1123F ACP DISCUSS/DSCN MKR DOCD: CPT | Performed by: PHYSICIAN ASSISTANT

## 2023-11-15 PROCEDURE — 3074F SYST BP LT 130 MM HG: CPT | Performed by: PHYSICIAN ASSISTANT

## 2023-11-15 PROCEDURE — 3017F COLORECTAL CA SCREEN DOC REV: CPT | Performed by: PHYSICIAN ASSISTANT

## 2023-11-15 PROCEDURE — G8427 DOCREV CUR MEDS BY ELIG CLIN: HCPCS | Performed by: PHYSICIAN ASSISTANT

## 2023-11-15 PROCEDURE — G8417 CALC BMI ABV UP PARAM F/U: HCPCS | Performed by: PHYSICIAN ASSISTANT

## 2023-11-15 PROCEDURE — G8399 PT W/DXA RESULTS DOCUMENT: HCPCS | Performed by: PHYSICIAN ASSISTANT

## 2023-11-15 PROCEDURE — 4130F TOPICAL PREP RX AOE: CPT | Performed by: PHYSICIAN ASSISTANT

## 2023-11-15 PROCEDURE — G8484 FLU IMMUNIZE NO ADMIN: HCPCS | Performed by: PHYSICIAN ASSISTANT

## 2023-11-15 PROCEDURE — 1036F TOBACCO NON-USER: CPT | Performed by: PHYSICIAN ASSISTANT

## 2023-11-15 PROCEDURE — 87635 SARS-COV-2 COVID-19 AMP PRB: CPT | Performed by: PHYSICIAN ASSISTANT

## 2023-11-15 PROCEDURE — 99213 OFFICE O/P EST LOW 20 MIN: CPT | Performed by: PHYSICIAN ASSISTANT

## 2023-11-15 PROCEDURE — 1090F PRES/ABSN URINE INCON ASSESS: CPT | Performed by: PHYSICIAN ASSISTANT

## 2023-11-15 PROCEDURE — 3078F DIAST BP <80 MM HG: CPT | Performed by: PHYSICIAN ASSISTANT

## 2023-11-15 RX ORDER — NEOMYCIN SULFATE, POLYMYXIN B SULFATE AND HYDROCORTISONE 10; 3.5; 1 MG/ML; MG/ML; [USP'U]/ML
3 SUSPENSION/ DROPS AURICULAR (OTIC) 4 TIMES DAILY
Qty: 10 ML | Refills: 0 | Status: SHIPPED | OUTPATIENT
Start: 2023-11-15 | End: 2023-11-25

## 2023-11-15 NOTE — TELEPHONE ENCOUNTER
[facility],[address] DOES NOT carry molnupiravir. Do you want to prescribe something else or send to another pharmacy.  Pharmacy #[phone]

## 2023-11-15 NOTE — PROGRESS NOTES
CC/HPI: Patient here today for recheck status post removal of hardware right foot on 5/23/23.  She is status post right ankle arthroscopy and synovectomy, Right triple foot fusion, harvesting and use of autograft bone on 4/12/22. She reports an improvement in pain from last office visit on 7/12/23, but does note increased pain on the lateral right ankle and numbness of the 4th and 5th toes with prolonged walking.  Patient works in a [employer] and is on her feet all day, 5 days a week. She confirms that she continues to ice, but is not wearing any orthotics.  Nature: Sharp and Numbness ;  Pain scale:  7  Duration/Onset Date of Surgery:  5/23/23  Location:  Right Foot  Course: Getting Better   Aggravating factors:  prolonged standing/walking  Treatments: Icing  Other: work: [employer], PCP: Carmen Miranda MD    RX:  1.  6 months with repeat XR      Interpretor Jae ID #[id number]   TRANSFER - IN REPORT:    Verbal report received from [address], RN(name) on [address]  being received from [employer]) for routine progression of care      Report consisted of patients Situation, Background, Assessment and   Recommendations(SBAR). Information from the following report(s) SBAR, Kardex, Intake/Output, MAR, Recent Results and Cardiac Rhythm NSR was reviewed with the receiving nurse. Opportunity for questions and clarification was provided. Assessment will be completed upon patients arrival to unit and care assumed. 1935 -   End of Shift Note    Bedside shift change report given to LUCIEN Pimentel (oncoming nurse) by Thereas Us RN (offgoing nurse). Report included the following information SBAR, Kardex, Intake/Output, MAR and Recent Results    Shift worked:  [id number]     Shift summary and any significant changes:     Palpable pulse to surgical leg; dressing clean, dry, & intact; pain managed well. Concerns for physician to address:  none     Zone phone for oncoming shift:          Activity:     Number times ambulated in hallways past shift: 0  Number of times OOB to chair past shift: 0    Cardiac:   Cardiac Monitoring: No      Cardiac Rhythm: Normal sinus rhythm    Access:   Current line(s): PIV     Genitourinary:   Urinary status: voiding and due to void    Respiratory:   O2 Device: Nasal cannula  Chronic home O2 use?: NO  Incentive spirometer at bedside: YES     GI:     Current diet:  DIET CARDIAC Regular  Passing flatus: YES  Tolerating current diet: YES       Pain Management:   Patient states pain is manageable on current regimen: YES    Skin:  Juan Manuel Score: 19  Interventions: float heels and increase time out of bed    Patient Safety:  Fall Score:  Total Score: 3  Interventions: bed/chair alarm, assistive device (walker, cane, etc), gripper socks, pt to call before getting OOB and stay with me (per policy)  High Fall Risk: Yes    Length of Stay:  Expected LOS: - - -  Actual LOS: 1      Tania Parra RN

## 2023-11-15 NOTE — PROGRESS NOTES
atraumatic. Right Ear: Tympanic membrane, ear canal and external ear normal.      Left Ear: Tympanic membrane, ear canal and external ear normal.      Nose: Congestion present. Mouth/Throat:      Mouth: Mucous membranes are moist.      Pharynx: Oropharynx is clear. Eyes:      Conjunctiva/sclera: Conjunctivae normal.   Cardiovascular:      Rate and Rhythm: Normal rate and regular rhythm. Pulses: Normal pulses. Heart sounds: Normal heart sounds. Pulmonary:      Effort: Pulmonary effort is normal. No respiratory distress. Breath sounds: Normal breath sounds. No stridor. No wheezing, rhonchi or rales. Comments: Appeared to be breathing with a little added effort after walking and wearing mask per nurse, when I saw pt she was breathing okay with no increased effort. Appeared somewhat clammy. Lungs CTAB. Denies sob. Musculoskeletal:         General: No swelling. Cervical back: Neck supple. Skin:     General: Skin is warm and dry. Neurological:      General: No focal deficit present. Mental Status: She is alert and oriented to person, place, and time. Mental status is at baseline. Psychiatric:         Mood and Affect: Mood normal.         Behavior: Behavior normal.         Assessment/Plan:   Diagnosis Orders   1. COVID-19  molnupiravir 200 MG capsule      2. Fever, unspecified fever cause  AMB POC COVID-19 COV      3. Acute infection of external ear, right  neomycin-polymyxin-hydrocortisone (CORTISPORIN) [id number] otic suspension        Results for orders placed or performed in visit on 11/15/23   AMB POC COVID-19 COV   Result Value Ref Range    SARS-COV-2 RNA, POC Positive     Lot number swab [id number]     EXP date swab 03/19/2024     Lot number solution [id number]     EXP date solution 08/31/2025     LOT NUMBER POC      EXPIRATION DATE       Addendum: antivirals not available at pt preferred pharmacy.  Discussed interaction with paxlovid with pt/ so they had

## 2023-11-16 ENCOUNTER — TELEPHONE (OUTPATIENT)
Dept: FAMILY MEDICINE CLINIC | Age: 73
End: 2023-11-16

## 2023-11-16 ENCOUNTER — CLINICAL DOCUMENTATION (OUTPATIENT)
Dept: FAMILY MEDICINE CLINIC | Age: 73
End: 2023-11-16

## 2023-11-16 RX ORDER — NIRMATRELVIR AND RITONAVIR 150-100 MG
KIT ORAL
Qty: 20 TABLET | Refills: 0 | Status: CANCELLED | OUTPATIENT
Start: 2023-11-16 | End: 2023-11-21

## 2023-11-16 NOTE — TELEPHONE ENCOUNTER
I have called around to pharmacies to try to find this medication for the patient. I have found it at [facility] in [place]. I have given the verbal RX to [facility], Pharmacist and she reads it back to me to confirm. I have called the patient's  and let him know this. He will go to get RX for wife to start taking.

## 2023-11-16 NOTE — TELEPHONE ENCOUNTER
----- Message from Joleen Barker PA-C sent at 11/16/2023 12:38 PM EST -----  I saw the message where pt wanted something else to be sent but the pharmacy I don't think will carry either antiviral. I tried sending the paxlovid as well and got the same message. Did they want to just continue with home care for now?

## 2023-11-16 NOTE — TELEPHONE ENCOUNTER
Patient's  states he would rather something different called in for molnupiravir as he does not use any other pharmacy.

## 2023-11-16 NOTE — TELEPHONE ENCOUNTER
I have taken care of this. I have called the RX into [facility] in [place] as they have the medication in stock. I have advised the patient's  and he will go pick it up for her.

## 2023-11-17 VITALS
HEART RATE: 90 BPM | TEMPERATURE: 99.9 F | WEIGHT: 176.59 LBS | DIASTOLIC BLOOD PRESSURE: 80 MMHG | OXYGEN SATURATION: 95 % | BODY MASS INDEX: 35.6 KG/M2 | SYSTOLIC BLOOD PRESSURE: 140 MMHG | RESPIRATION RATE: 18 BRPM | HEIGHT: 59 IN

## 2023-11-27 ENCOUNTER — OFFICE VISIT (OUTPATIENT)
Dept: FAMILY MEDICINE CLINIC | Age: 73
End: 2023-11-27
Payer: MEDICARE

## 2023-11-27 VITALS
OXYGEN SATURATION: 95 % | BODY MASS INDEX: 34.39 KG/M2 | HEART RATE: 62 BPM | SYSTOLIC BLOOD PRESSURE: 163 MMHG | RESPIRATION RATE: 16 BRPM | WEIGHT: 170.6 LBS | TEMPERATURE: 97.9 F | HEIGHT: 59 IN | DIASTOLIC BLOOD PRESSURE: 81 MMHG

## 2023-11-27 DIAGNOSIS — R09.81 NASAL CONGESTION: Primary | ICD-10-CM

## 2023-11-27 DIAGNOSIS — Z86.16 HISTORY OF COVID-19: ICD-10-CM

## 2023-11-27 PROCEDURE — 3079F DIAST BP 80-89 MM HG: CPT | Performed by: PHYSICIAN ASSISTANT

## 2023-11-27 PROCEDURE — 1036F TOBACCO NON-USER: CPT | Performed by: PHYSICIAN ASSISTANT

## 2023-11-27 PROCEDURE — G8428 CUR MEDS NOT DOCUMENT: HCPCS | Performed by: PHYSICIAN ASSISTANT

## 2023-11-27 PROCEDURE — G8484 FLU IMMUNIZE NO ADMIN: HCPCS | Performed by: PHYSICIAN ASSISTANT

## 2023-11-27 PROCEDURE — 3017F COLORECTAL CA SCREEN DOC REV: CPT | Performed by: PHYSICIAN ASSISTANT

## 2023-11-27 PROCEDURE — 3077F SYST BP >= 140 MM HG: CPT | Performed by: PHYSICIAN ASSISTANT

## 2023-11-27 PROCEDURE — 99213 OFFICE O/P EST LOW 20 MIN: CPT | Performed by: PHYSICIAN ASSISTANT

## 2023-11-27 PROCEDURE — 1123F ACP DISCUSS/DSCN MKR DOCD: CPT | Performed by: PHYSICIAN ASSISTANT

## 2023-11-27 PROCEDURE — G8399 PT W/DXA RESULTS DOCUMENT: HCPCS | Performed by: PHYSICIAN ASSISTANT

## 2023-11-27 PROCEDURE — 1090F PRES/ABSN URINE INCON ASSESS: CPT | Performed by: PHYSICIAN ASSISTANT

## 2023-11-27 PROCEDURE — G8417 CALC BMI ABV UP PARAM F/U: HCPCS | Performed by: PHYSICIAN ASSISTANT

## 2024-01-15 RX ORDER — GUANFACINE 2 MG/1
1 TABLET ORAL NIGHTLY
Qty: 90 TABLET | Refills: 3 | Status: SHIPPED | OUTPATIENT
Start: 2024-01-15

## 2024-01-15 NOTE — TELEPHONE ENCOUNTER
Requested Prescriptions     Pending Prescriptions Disp Refills    guanFACINE HCl 2 MG TABS [Pharmacy Med Name: GUANFACINE HYDROCHLORIDE 2 MG Tablet] 90 tablet 3     Sig: TAKE 1 TABLET EVERY NIGHT     LOV 11/27/23  Last Labs 8/7/23  Last refill 8/30/23 #90 with 1 RF

## 2024-01-25 DIAGNOSIS — I10 ESSENTIAL (PRIMARY) HYPERTENSION: ICD-10-CM

## 2024-01-25 NOTE — TELEPHONE ENCOUNTER
Requested Prescriptions     Pending Prescriptions Disp Refills    clopidogrel (PLAVIX) 75 MG tablet [Pharmacy Med Name: clopidogrel 75 mg tablet] 90 tablet 1     Sig: TAKE ONE TABLET BY MOUTH DAILY    labetalol (NORMODYNE) 200 MG tablet [Pharmacy Med Name: labetalol 200 mg tablet] 60 tablet 1     Sig: TAKE ONE TABLET BY MOUTH TWICE DAILY     Last seen:  11/27/23

## 2024-01-29 RX ORDER — CLOPIDOGREL BISULFATE 75 MG/1
75 TABLET ORAL DAILY
Qty: 90 TABLET | Refills: 1 | Status: SHIPPED | OUTPATIENT
Start: 2024-01-29

## 2024-01-29 RX ORDER — LABETALOL 200 MG/1
200 TABLET, FILM COATED ORAL 2 TIMES DAILY
Qty: 180 TABLET | Refills: 1 | Status: SHIPPED | OUTPATIENT
Start: 2024-01-29

## 2024-04-04 NOTE — PROGRESS NOTES
Group Therapy Note    Date: 4/4/2024    Group Start Time: 10:40 AM  Group End Time: 11:30 AM  Group Topic: Cognitive Skills    [facility]    Carina Garcia MSW        Group Therapy Note    Attendees: 7    Writer facilitated cognitive skills group centered around boundaries this morning. Writer opened group by asking if there was any leftover processing from the previous group, and one patient did need to process. This naturally led into the topic for the group, which was boundaries. Writer provided education on why it is important to set boundaries and what that might look like. Writer facilitated resulting discussions about boundaries.        Patient's Goal:  Participate in group therapy, engage openly with peers, build understanding of the importance of boundaries and how to successfully set them.    Notes:  Patient engaged well in group this morning. She provided good verbal support for peers, asking insightful questions and demonstrating appropriate empathy for a peer who was processing. Patient interacted well with her peers throughout and will continue with identified treatment goals in further groups.     Status After Intervention:  Unchanged    Participation Level: Active Listener and Interactive    Participation Quality: Appropriate, Attentive, and Supportive      Speech:  normal      Thought Process/Content: Logical      Affective Functioning: Congruent      Mood: euthymic      Level of consciousness:  Alert and Oriented x4      Response to Learning: Able to verbalize current knowledge/experience, Capable of insight, and Progressing to goal      Endings: None Reported    Modes of Intervention: Education, Support, and Exploration      Discipline Responsible: /Counselor      Signature:  MIESHA Perez     General Surgery End of Shift Nursing Note    Bedside shift change report given to LUCIEN Pérez (oncoming nurse) by Severiano Dover, RN (offgoing nurse). Report included the following information SBAR, Kardex, Intake/Output and Recent Results. Shift worked:   7A to [employer] of shift:    Uneventful day,  Up ad daniel. Wound vac remains intact. DANA drain minimal drainage. Please offer patient Melatonin for sleep for tonight. Small BM today, started on Miralax daily yesterday. Issues for physician to address:        Number times ambulated in hallway past shift: 2     Number of times OOB to chair past shift: 3    Pain Management:  Current medication: see MAR  Patient states pain is manageable on current pain medication: YES    GI:    Current diet:  DIET CARDIAC    Tolerating current diet: YES  Passing flatus: YES  Last Bowel Movement: Today 8/11/19   Appearance: small hard stool    Respiratory:    Incentive Spirometer at bedside: YES  Patient instructed on use: YES    Patient Safety:    Falls Score: 2  Bed Alarm On? No  Sitter?  No    Jennifer Garcia RN

## (undated) DEVICE — 1200 GUARD II KIT W/5MM TUBE W/O VAC TUBE: Brand: GUARDIAN

## (undated) DEVICE — REM POLYHESIVE ADULT PATIENT RETURN ELECTRODE: Brand: VALLEYLAB

## (undated) DEVICE — SUTURE VCRL SZ 3-0 L27IN ABSRB UD L24MM PS-1 3/8 CIR PRIM J936H

## (undated) DEVICE — APPLICATOR COT-TIP 6IN WOOD -- 2/PK STRL

## (undated) DEVICE — TOWEL SURG W17XL27IN STD BLU COT NONFENESTRATED PREWASHED

## (undated) DEVICE — LABEL MED CARD MRMC STRL

## (undated) DEVICE — STERILE POLYISOPRENE POWDER-FREE SURGICAL GLOVES: Brand: PROTEXIS

## (undated) DEVICE — SPONGE: SPECIALTY PEANUT XR 100/CS: Brand: MEDICAL ACTION INDUSTRIES

## (undated) DEVICE — HANDLE LT SNAP ON ULT DURABLE LENS FOR TRUMPF ALC DISPOSABLE

## (undated) DEVICE — VASCULAR-RICHMOND-LF: Brand: MEDLINE INDUSTRIES, INC.

## (undated) DEVICE — SOL INJ SOD CL 0.9% 500ML BG --

## (undated) DEVICE — SYRINGE MED 20ML STD CLR PLAS LUERLOCK TIP N CTRL DISP

## (undated) DEVICE — GOWN,SIRUS,NONRNF,SETINSLV,XL,20/CS: Brand: MEDLINE

## (undated) DEVICE — SUTURE PROL 5-0 L18IN NONABSORBABLE BLU RB-2 L13MM 1/2 CIR 8713H

## (undated) DEVICE — 450 ML BOTTLE OF 0.05% CHLORHEXIDINE GLUCONATE IN 99.95% STERILE WATER FOR IRRIGATION, USP AND APPLICATOR.: Brand: IRRISEPT ANTIMICROBIAL WOUND LAVAGE

## (undated) DEVICE — MEDI-VAC NON-CONDUCTIVE SUCTION TUBING: Brand: CARDINAL HEALTH

## (undated) DEVICE — AGENT HEMSTAT W4XL4IN OXIDIZED REGENERATED CELOS ABSRB SFT

## (undated) DEVICE — DRSG VAC ASST CLSR GRNUFM SM --

## (undated) DEVICE — Device

## (undated) DEVICE — SUT PROL 2-0 30IN CT1 BLU --

## (undated) DEVICE — PROBE VASC 8MHZ WTRPRF

## (undated) DEVICE — DBD-PACK,LAPAROTOMY,2 REINFORCED GOWNS: Brand: MEDLINE

## (undated) DEVICE — SYRINGE ANGIO CNTRST DEL 20 CC POLYCARB LIGHT GRN MEDALLION

## (undated) DEVICE — SUTURE PERMAHAND SZ 2-0 L30IN NONABSORBABLE BLK SILK W/O A305H

## (undated) DEVICE — GOWN,PREVENTION PLUS,XLN/2XL,ST,22/CS: Brand: MEDLINE

## (undated) DEVICE — SOLUTION IV 1000ML 0.9% SOD CHL

## (undated) DEVICE — INTENDED FOR TISSUE SEPARATION, AND OTHER PROCEDURES THAT REQUIRE A SHARP SURGICAL BLADE TO PUNCTURE OR CUT.: Brand: BARD-PARKER ® CARBON RIB-BACK BLADES

## (undated) DEVICE — LOOP,VESSEL,MAXI,BLUE,2/PK,STERILE: Brand: MEDLINE

## (undated) DEVICE — Z DISCONTINUED USE 2131664 WIPE INSTR W3XL3IN NONLINTING

## (undated) DEVICE — SOLUTION IV 500ML 0.9% SOD CHL FLX CONT

## (undated) DEVICE — DRAPE,REIN 53X77,STERILE: Brand: MEDLINE

## (undated) DEVICE — (D)PREP SKN CHLRAPRP APPL 26ML -- CONVERT TO ITEM 371833

## (undated) DEVICE — SUTURE PERMAHAND SZ 3-0 L30IN NONABSORBABLE BLK SILK BRAID A304H

## (undated) DEVICE — APPLICATOR FBR TIP L6IN COT TIP WOOD SHFT SWAB 2000 PER CA

## (undated) DEVICE — SUTURE VCRL SZ 2-0 L36IN ABSRB VLT L36MM CT-1 1/2 CIR J345H

## (undated) DEVICE — SEALANT FIBRIN 10 CC FRZN PRE FILLED SYR TISSEEL

## (undated) DEVICE — SUTURE VCRL SZ 3-0 L27IN ABSRB UD L26MM SH 1/2 CIR J416H

## (undated) DEVICE — SUTURE VCRL 2-0 XLH 27IN ABSRB BRAID VLT J581G

## (undated) DEVICE — CANISTER WND VAC ASST CLSR --

## (undated) DEVICE — INFECTION CONTROL KIT SYS

## (undated) DEVICE — KIT,1200CC CANISTER,3/16"X6' TUBING: Brand: MEDLINE INDUSTRIES, INC.

## (undated) DEVICE — NDL HYPO REG BVL RW 22GX1IN --

## (undated) DEVICE — STAPLER SKIN H3.9MM WIRE DIA0.58MM CRWN 6.9MM 35 STPL ROT

## (undated) DEVICE — SUT PROL 6-0 24IN BV1 DA BLU --

## (undated) DEVICE — SUTURE PROL SZ 5-0 L24IN NONABSORBABLE BLU RB-2 L13IN 1/2 8554H

## (undated) DEVICE — LABEL MED VASC MRMC STRL

## (undated) DEVICE — BULB SYRINGE, IRRIGATION WITH PROTECTIVE CAP, 60 CC, INDIVIDUALLY WRAPPED: Brand: DOVER

## (undated) DEVICE — SOLUTION SCRB 4OZ 10% PVP I POVIDONE IOD TOP PAINT EXIDINE

## (undated) DEVICE — STRAP,POSITIONING,KNEE/BODY,FOAM,4X60": Brand: MEDLINE

## (undated) DEVICE — SYR 10ML LUER LOK 1/5ML GRAD --

## (undated) DEVICE — SUTURE ABSORBABLE BRAIDED 2-0 CT-1 27 IN UD VICRYL J259H

## (undated) DEVICE — SUTURE VCRL SZ 2-0 L36IN ABSRB UD L36MM CT-1 1/2 CIR J945H

## (undated) DEVICE — SYR 3ML LL TIP 1/10ML GRAD --

## (undated) DEVICE — SUTURE ETHLN SZ 2-0 L30IN NONABSORBABLE BLK L36MM PSLX 3/8 1697H

## (undated) DEVICE — SPONGE HEMOSTAT CELLULS 4X8IN -- SURGICEL

## (undated) DEVICE — BALL ELECTRODE: Brand: VALLEYLAB

## (undated) DEVICE — BLADE ASSEMB CLP HAIR FINE --

## (undated) DEVICE — SUTURE NONABSORBABLE MONOFILAMENT 4-0 CV-5 PT-13 24 IN GORTX 5K08B

## (undated) DEVICE — PREP SKN CHLRAPRP APL 26ML STR --

## (undated) DEVICE — TUBING, SUCTION, 1/4" X 10', STRAIGHT: Brand: MEDLINE

## (undated) DEVICE — DRAPE OCCL VAC CLSR 30.5X26IN --

## (undated) DEVICE — 3M™ IOBAN™ 2 ANTIMICROBIAL INCISE DRAPE 6648EZ: Brand: IOBAN™ 2

## (undated) DEVICE — YANKAUER BULB TIP, NO VENT: Brand: ARGYLE

## (undated) DEVICE — DRESSING NEG PRSS M 18X12.5X3.3CM POLYUR FOR WND THER VAC